# Patient Record
Sex: FEMALE | Race: BLACK OR AFRICAN AMERICAN | NOT HISPANIC OR LATINO | Employment: OTHER | URBAN - METROPOLITAN AREA
[De-identification: names, ages, dates, MRNs, and addresses within clinical notes are randomized per-mention and may not be internally consistent; named-entity substitution may affect disease eponyms.]

---

## 2023-02-21 PROBLEM — E89.2 STATUS POST PARATHYROIDECTOMY (CMS/HCC): Status: ACTIVE | Noted: 2023-02-21

## 2023-02-21 PROBLEM — M10.9 GOUT: Status: ACTIVE | Noted: 2023-02-21

## 2023-02-21 PROBLEM — R93.0 ABNORMAL MRI OF THE HEAD: Status: ACTIVE | Noted: 2023-02-21

## 2023-02-21 PROBLEM — M54.9 DORSODYNIA: Status: ACTIVE | Noted: 2023-02-21

## 2023-02-21 PROBLEM — E28.8 HYPERANDROGENISM: Status: ACTIVE | Noted: 2023-02-21

## 2023-02-21 PROBLEM — J98.4 RESTRICTIVE LUNG DISEASE: Status: ACTIVE | Noted: 2023-02-21

## 2023-02-21 PROBLEM — L68.0 HIRSUTISM: Status: ACTIVE | Noted: 2023-02-21

## 2023-02-21 PROBLEM — E83.52 SERUM CALCIUM ELEVATED: Status: ACTIVE | Noted: 2023-02-21

## 2023-02-21 PROBLEM — M85.80 OSTEOPENIA: Status: ACTIVE | Noted: 2023-02-21

## 2023-02-21 PROBLEM — R79.89 ELEVATED TESTOSTERONE LEVEL IN FEMALE: Status: ACTIVE | Noted: 2023-02-21

## 2023-02-21 PROBLEM — E55.9 VITAMIN D DEFICIENCY: Status: ACTIVE | Noted: 2023-02-21

## 2023-02-21 PROBLEM — E21.3 HYPERPARATHYROIDISM (MULTI): Status: ACTIVE | Noted: 2023-02-21

## 2023-02-21 PROBLEM — M81.0 OSTEOPOROSIS: Status: ACTIVE | Noted: 2023-02-21

## 2023-02-21 PROBLEM — I63.9 CVA (CEREBRAL VASCULAR ACCIDENT) (MULTI): Status: ACTIVE | Noted: 2023-02-21

## 2023-02-21 PROBLEM — N28.9 ABNORMAL KIDNEY FUNCTION: Status: ACTIVE | Noted: 2023-02-21

## 2023-02-21 PROBLEM — Z86.39 HISTORY OF HYPERCALCEMIA: Status: ACTIVE | Noted: 2023-02-21

## 2023-02-21 PROBLEM — I67.2 INTRACRANIAL ATHEROSCLEROSIS: Status: ACTIVE | Noted: 2023-02-21

## 2023-02-21 PROBLEM — G62.9 PERIPHERAL NEUROPATHY: Status: ACTIVE | Noted: 2023-02-21

## 2023-02-21 PROBLEM — M54.9 BACK PAIN: Status: ACTIVE | Noted: 2023-02-21

## 2023-02-21 PROBLEM — R41.3 MEMORY LOSS: Status: ACTIVE | Noted: 2023-02-21

## 2023-02-21 PROBLEM — Z90.12 ACQUIRED ABSENCE OF BREAST AND NIPPLE, LEFT: Status: ACTIVE | Noted: 2023-02-21

## 2023-02-21 PROBLEM — C50.919 MALIGNANT NEOPLASM OF BREAST (MULTI): Status: ACTIVE | Noted: 2023-02-21

## 2023-02-21 PROBLEM — I10 ESSENTIAL HYPERTENSION: Status: ACTIVE | Noted: 2023-02-21

## 2023-02-21 PROBLEM — Z86.73 HISTORY OF TRANSIENT CEREBRAL ISCHEMIA: Status: ACTIVE | Noted: 2023-02-21

## 2023-02-21 PROBLEM — N18.9 CHRONIC RENAL INSUFFICIENCY: Status: ACTIVE | Noted: 2023-02-21

## 2023-02-21 PROBLEM — D21.9 FIBROIDS: Status: ACTIVE | Noted: 2023-02-21

## 2023-02-21 PROBLEM — E87.6 HYPOKALEMIA: Status: ACTIVE | Noted: 2023-02-21

## 2023-02-21 PROBLEM — E78.5 HYPERLIPIDEMIA: Status: ACTIVE | Noted: 2023-02-21

## 2023-02-21 PROBLEM — G31.84 MILD COGNITIVE IMPAIRMENT: Status: ACTIVE | Noted: 2023-02-21

## 2023-02-21 PROBLEM — R79.89 ELEVATED HOMOCYSTEINE: Status: ACTIVE | Noted: 2023-02-21

## 2023-02-21 PROBLEM — E11.9 DIABETES MELLITUS (MULTI): Status: ACTIVE | Noted: 2023-02-21

## 2023-02-21 PROBLEM — N18.32 CHRONIC RENAL IMPAIRMENT, STAGE 3B (MULTI): Status: ACTIVE | Noted: 2023-02-21

## 2023-02-21 PROBLEM — D75.1 POLYCYTHEMIA: Status: ACTIVE | Noted: 2023-02-21

## 2023-02-21 RX ORDER — DULAGLUTIDE 1.5 MG/.5ML
1.5 INJECTION, SOLUTION SUBCUTANEOUS
COMMUNITY
Start: 2022-05-19 | End: 2023-04-14

## 2023-02-21 RX ORDER — ATORVASTATIN CALCIUM 20 MG/1
1 TABLET, FILM COATED ORAL DAILY
COMMUNITY
Start: 2022-05-19 | End: 2023-06-22 | Stop reason: SDUPTHER

## 2023-02-21 RX ORDER — AMLODIPINE BESYLATE 10 MG/1
1 TABLET ORAL DAILY
COMMUNITY
Start: 2012-05-11 | End: 2023-05-17 | Stop reason: SDUPTHER

## 2023-02-21 RX ORDER — LISINOPRIL 40 MG/1
1 TABLET ORAL DAILY
COMMUNITY
Start: 2015-03-13 | End: 2023-06-22 | Stop reason: SDUPTHER

## 2023-02-21 RX ORDER — VITAMIN B COMPLEX
1 CAPSULE ORAL DAILY
COMMUNITY
Start: 2022-03-07

## 2023-02-21 RX ORDER — ANASTROZOLE 1 MG/1
1 TABLET ORAL DAILY
COMMUNITY
Start: 2015-09-09 | End: 2023-03-31 | Stop reason: SDUPTHER

## 2023-02-21 RX ORDER — BLOOD SUGAR DIAGNOSTIC
STRIP MISCELLANEOUS
COMMUNITY
Start: 2016-08-30

## 2023-02-21 RX ORDER — METOPROLOL SUCCINATE 100 MG/1
1 TABLET, EXTENDED RELEASE ORAL DAILY
COMMUNITY
Start: 2017-10-26 | End: 2023-04-24

## 2023-02-21 RX ORDER — ALLOPURINOL 300 MG/1
1 TABLET ORAL DAILY
COMMUNITY
Start: 2012-06-11

## 2023-02-21 RX ORDER — FOLIC ACID 1 MG/1
1 TABLET ORAL DAILY
COMMUNITY
Start: 2022-03-07

## 2023-02-21 RX ORDER — CLOPIDOGREL BISULFATE 75 MG/1
1 TABLET ORAL DAILY
COMMUNITY
Start: 2022-03-07

## 2023-02-21 RX ORDER — LANCETS 28 GAUGE
EACH MISCELLANEOUS 2 TIMES DAILY
COMMUNITY

## 2023-03-28 ENCOUNTER — OFFICE VISIT (OUTPATIENT)
Dept: PRIMARY CARE | Facility: CLINIC | Age: 73
End: 2023-03-28
Payer: MEDICARE

## 2023-03-28 VITALS
DIASTOLIC BLOOD PRESSURE: 90 MMHG | SYSTOLIC BLOOD PRESSURE: 130 MMHG | WEIGHT: 122 LBS | TEMPERATURE: 95.5 F | BODY MASS INDEX: 20.3 KG/M2

## 2023-03-28 DIAGNOSIS — I10 ESSENTIAL HYPERTENSION: Primary | ICD-10-CM

## 2023-03-28 DIAGNOSIS — N28.1 RENAL CYST: ICD-10-CM

## 2023-03-28 DIAGNOSIS — E11.29 TYPE 2 DIABETES MELLITUS WITH MICROALBUMINURIA, WITHOUT LONG-TERM CURRENT USE OF INSULIN (MULTI): ICD-10-CM

## 2023-03-28 DIAGNOSIS — M81.0 OSTEOPOROSIS WITHOUT CURRENT PATHOLOGICAL FRACTURE, UNSPECIFIED OSTEOPOROSIS TYPE: ICD-10-CM

## 2023-03-28 DIAGNOSIS — R80.9 TYPE 2 DIABETES MELLITUS WITH MICROALBUMINURIA, WITHOUT LONG-TERM CURRENT USE OF INSULIN (MULTI): ICD-10-CM

## 2023-03-28 DIAGNOSIS — N18.32 CHRONIC RENAL IMPAIRMENT, STAGE 3B (MULTI): ICD-10-CM

## 2023-03-28 PROCEDURE — 99213 OFFICE O/P EST LOW 20 MIN: CPT | Performed by: INTERNAL MEDICINE

## 2023-03-28 PROCEDURE — 4010F ACE/ARB THERAPY RXD/TAKEN: CPT | Performed by: INTERNAL MEDICINE

## 2023-03-28 PROCEDURE — 3075F SYST BP GE 130 - 139MM HG: CPT | Performed by: INTERNAL MEDICINE

## 2023-03-28 PROCEDURE — 3044F HG A1C LEVEL LT 7.0%: CPT | Performed by: INTERNAL MEDICINE

## 2023-03-28 PROCEDURE — 3080F DIAST BP >= 90 MM HG: CPT | Performed by: INTERNAL MEDICINE

## 2023-03-28 ASSESSMENT — PATIENT HEALTH QUESTIONNAIRE - PHQ9
2. FEELING DOWN, DEPRESSED OR HOPELESS: NOT AT ALL
1. LITTLE INTEREST OR PLEASURE IN DOING THINGS: NOT AT ALL
SUM OF ALL RESPONSES TO PHQ9 QUESTIONS 1 AND 2: 0

## 2023-03-28 NOTE — PROGRESS NOTES
Subjective   Patient ID: Saige Yates is a 72 y.o. female who presents for No chief complaint on file..  HPI  No acute complaints. Pt is feeling really well. She is eating healthy and is happy with her weight. Feels her energy levels improving.   Review of Systems      /90   Temp 35.3 °C (95.5 °F)   Wt 55.3 kg (122 lb)   BMI 20.30 kg/m²     Objective   Physical Exam  Constitutional:       General: She is not in acute distress.     Appearance: Normal appearance.   HENT:      Head: Normocephalic and atraumatic.      Mouth/Throat:      Mouth: Mucous membranes are moist.      Pharynx: Oropharynx is clear.   Eyes:      Extraocular Movements: Extraocular movements intact.   Cardiovascular:      Rate and Rhythm: Normal rate and regular rhythm.      Heart sounds: No murmur heard.  Pulmonary:      Effort: Pulmonary effort is normal.      Breath sounds: Normal breath sounds. No wheezing.   Abdominal:      General: Bowel sounds are normal. There is no distension.      Palpations: Abdomen is soft.      Tenderness: There is no abdominal tenderness.   Skin:     General: Skin is warm and dry.   Neurological:      General: No focal deficit present.      Mental Status: She is alert and oriented to person, place, and time.   Psychiatric:         Mood and Affect: Mood normal.         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 Has remained stable  US renal obtained showed L renal cyst that has grown in size since 2017 - referral to urology sent   Continue Lisinopril 40mg daily             Musculoskeletal    Osteoporosis     Pt no longer on Risendronate due to GI discomfort  Follows with endocrinology. Will follow up with them to manage this due to pts  complicated history of parathyroidectomy             Endocrine/Metabolic    Diabetes mellitus (CMS/HCC)     Improving A1C, continue Trulicity          Other Visit Diagnoses       Renal cyst        Relevant Orders    Referral to Urology          Health Maintenance:  Mammogram Due 5/2023  Colonoscopy DUE 12/2023  DEXA due 2024    Follow up in 4 months with repeat lab work at that time.

## 2023-03-28 NOTE — PROGRESS NOTES
I reviewed with the resident the medical history and the resident’s findings on physical examination.  I discussed with the resident the patient’s diagnosis and concur with the treatment plan as documented in the resident note.     Scarlett Bangura MD

## 2023-03-28 NOTE — ASSESSMENT & PLAN NOTE
Pt no longer on Risendronate due to GI discomfort  Follows with endocrinology. Will follow up with them to manage this due to pts complicated history of parathyroidectomy

## 2023-03-28 NOTE — ASSESSMENT & PLAN NOTE
Likely secondary to diabetic renal disease. Has remained stable  US renal obtained showed L renal cyst that has grown in size since 2017 - referral to urology sent   Continue Lisinopril 40mg daily

## 2023-03-31 DIAGNOSIS — C50.919 MALIGNANT NEOPLASM OF FEMALE BREAST, UNSPECIFIED ESTROGEN RECEPTOR STATUS, UNSPECIFIED LATERALITY, UNSPECIFIED SITE OF BREAST (MULTI): Primary | ICD-10-CM

## 2023-03-31 RX ORDER — ANASTROZOLE 1 MG/1
1 TABLET ORAL DAILY
Qty: 90 TABLET | Refills: 0 | Status: SHIPPED | OUTPATIENT
Start: 2023-03-31 | End: 2024-05-28 | Stop reason: SDUPTHER

## 2023-04-14 DIAGNOSIS — E11.29 TYPE 2 DIABETES MELLITUS WITH MICROALBUMINURIA, WITHOUT LONG-TERM CURRENT USE OF INSULIN (MULTI): Primary | ICD-10-CM

## 2023-04-14 DIAGNOSIS — R80.9 TYPE 2 DIABETES MELLITUS WITH MICROALBUMINURIA, WITHOUT LONG-TERM CURRENT USE OF INSULIN (MULTI): Primary | ICD-10-CM

## 2023-04-14 RX ORDER — DULAGLUTIDE 1.5 MG/.5ML
1.5 INJECTION, SOLUTION SUBCUTANEOUS
Qty: 6 ML | Refills: 0 | Status: SHIPPED | OUTPATIENT
Start: 2023-04-14 | End: 2023-06-14

## 2023-04-23 DIAGNOSIS — I67.2 INTRACRANIAL ATHEROSCLEROSIS: Primary | ICD-10-CM

## 2023-04-24 RX ORDER — METOPROLOL SUCCINATE 100 MG/1
TABLET, EXTENDED RELEASE ORAL
Qty: 90 TABLET | Refills: 3 | Status: SHIPPED | OUTPATIENT
Start: 2023-04-24 | End: 2023-12-28 | Stop reason: SDUPTHER

## 2023-05-17 DIAGNOSIS — I10 ESSENTIAL HYPERTENSION: Primary | ICD-10-CM

## 2023-05-17 RX ORDER — AMLODIPINE BESYLATE 10 MG/1
10 TABLET ORAL DAILY
Qty: 90 TABLET | Refills: 1 | Status: SHIPPED | OUTPATIENT
Start: 2023-05-17 | End: 2023-09-26 | Stop reason: SDUPTHER

## 2023-06-14 DIAGNOSIS — R80.9 TYPE 2 DIABETES MELLITUS WITH MICROALBUMINURIA, WITHOUT LONG-TERM CURRENT USE OF INSULIN (MULTI): ICD-10-CM

## 2023-06-14 DIAGNOSIS — E11.29 TYPE 2 DIABETES MELLITUS WITH MICROALBUMINURIA, WITHOUT LONG-TERM CURRENT USE OF INSULIN (MULTI): ICD-10-CM

## 2023-06-14 RX ORDER — DULAGLUTIDE 1.5 MG/.5ML
INJECTION, SOLUTION SUBCUTANEOUS
Qty: 6 ML | Refills: 3 | Status: SHIPPED | OUTPATIENT
Start: 2023-06-14 | End: 2023-06-16

## 2023-06-15 DIAGNOSIS — R80.9 TYPE 2 DIABETES MELLITUS WITH MICROALBUMINURIA, WITHOUT LONG-TERM CURRENT USE OF INSULIN (MULTI): ICD-10-CM

## 2023-06-15 DIAGNOSIS — E11.29 TYPE 2 DIABETES MELLITUS WITH MICROALBUMINURIA, WITHOUT LONG-TERM CURRENT USE OF INSULIN (MULTI): ICD-10-CM

## 2023-06-16 RX ORDER — DULAGLUTIDE 1.5 MG/.5ML
INJECTION, SOLUTION SUBCUTANEOUS
Qty: 6 ML | Refills: 3 | Status: SHIPPED | OUTPATIENT
Start: 2023-06-16 | End: 2024-02-02 | Stop reason: SDUPTHER

## 2023-06-20 LAB
ALBUMIN (G/DL) IN SER/PLAS: 3.9 G/DL (ref 3.4–5)
ANION GAP IN SER/PLAS: 17 MMOL/L (ref 10–20)
CALCIDIOL (25 OH VITAMIN D3) (NG/ML) IN SER/PLAS: 43 NG/ML
CALCIUM (MG/DL) IN SER/PLAS: 11 MG/DL (ref 8.6–10.6)
CARBON DIOXIDE, TOTAL (MMOL/L) IN SER/PLAS: 25 MMOL/L (ref 21–32)
CHLORIDE (MMOL/L) IN SER/PLAS: 102 MMOL/L (ref 98–107)
CREATININE (MG/DL) IN SER/PLAS: 1.59 MG/DL (ref 0.5–1.05)
ESTIMATED AVERAGE GLUCOSE FOR HBA1C: 163 MG/DL
GFR FEMALE: 34 ML/MIN/1.73M2
GLUCOSE (MG/DL) IN SER/PLAS: 177 MG/DL (ref 74–99)
HEMOGLOBIN A1C/HEMOGLOBIN TOTAL IN BLOOD: 7.3 %
PARATHYRIN INTACT (PG/ML) IN SER/PLAS: 75.3 PG/ML (ref 18.5–88)
PHOSPHATE (MG/DL) IN SER/PLAS: 3.7 MG/DL (ref 2.5–4.9)
POTASSIUM (MMOL/L) IN SER/PLAS: 4 MMOL/L (ref 3.5–5.3)
SODIUM (MMOL/L) IN SER/PLAS: 140 MMOL/L (ref 136–145)
UREA NITROGEN (MG/DL) IN SER/PLAS: 32 MG/DL (ref 6–23)

## 2023-06-22 DIAGNOSIS — E78.5 HYPERLIPIDEMIA, UNSPECIFIED HYPERLIPIDEMIA TYPE: ICD-10-CM

## 2023-06-22 DIAGNOSIS — I10 ESSENTIAL HYPERTENSION: ICD-10-CM

## 2023-06-22 RX ORDER — ATORVASTATIN CALCIUM 20 MG/1
20 TABLET, FILM COATED ORAL DAILY
Qty: 90 TABLET | Refills: 0 | Status: SHIPPED | OUTPATIENT
Start: 2023-06-22 | End: 2023-09-03

## 2023-06-22 RX ORDER — LISINOPRIL 40 MG/1
40 TABLET ORAL DAILY
Qty: 90 TABLET | Refills: 0 | Status: SHIPPED | OUTPATIENT
Start: 2023-06-22 | End: 2023-09-11

## 2023-06-26 LAB
TESTOSTERONE FREE (CHAN): 41.6 PG/ML (ref 0.2–3.7)
TESTOSTERONE,TOTAL,LC-MS/MS: 243 NG/DL (ref 2–45)

## 2023-08-29 ENCOUNTER — OFFICE VISIT (OUTPATIENT)
Dept: PRIMARY CARE | Facility: CLINIC | Age: 73
End: 2023-08-29
Payer: MEDICARE

## 2023-08-29 VITALS
BODY MASS INDEX: 19.3 KG/M2 | DIASTOLIC BLOOD PRESSURE: 60 MMHG | SYSTOLIC BLOOD PRESSURE: 100 MMHG | WEIGHT: 116 LBS | TEMPERATURE: 97.5 F

## 2023-08-29 DIAGNOSIS — I10 ESSENTIAL HYPERTENSION: ICD-10-CM

## 2023-08-29 DIAGNOSIS — R80.9 TYPE 2 DIABETES MELLITUS WITH MICROALBUMINURIA, WITHOUT LONG-TERM CURRENT USE OF INSULIN (MULTI): ICD-10-CM

## 2023-08-29 DIAGNOSIS — N18.30 CHRONIC RENAL IMPAIRMENT, STAGE 3 (MODERATE), UNSPECIFIED WHETHER STAGE 3A OR 3B CKD (MULTI): ICD-10-CM

## 2023-08-29 DIAGNOSIS — C50.919 MALIGNANT NEOPLASM OF FEMALE BREAST, UNSPECIFIED ESTROGEN RECEPTOR STATUS, UNSPECIFIED LATERALITY, UNSPECIFIED SITE OF BREAST (MULTI): ICD-10-CM

## 2023-08-29 DIAGNOSIS — E11.29 TYPE 2 DIABETES MELLITUS WITH MICROALBUMINURIA, WITHOUT LONG-TERM CURRENT USE OF INSULIN (MULTI): ICD-10-CM

## 2023-08-29 DIAGNOSIS — E55.9 VITAMIN D DEFICIENCY: ICD-10-CM

## 2023-08-29 DIAGNOSIS — E78.5 HYPERLIPIDEMIA, UNSPECIFIED HYPERLIPIDEMIA TYPE: Primary | ICD-10-CM

## 2023-08-29 PROCEDURE — 99214 OFFICE O/P EST MOD 30 MIN: CPT | Performed by: INTERNAL MEDICINE

## 2023-08-29 PROCEDURE — 3051F HG A1C>EQUAL 7.0%<8.0%: CPT | Performed by: INTERNAL MEDICINE

## 2023-08-29 PROCEDURE — 3078F DIAST BP <80 MM HG: CPT | Performed by: INTERNAL MEDICINE

## 2023-08-29 PROCEDURE — 1036F TOBACCO NON-USER: CPT | Performed by: INTERNAL MEDICINE

## 2023-08-29 PROCEDURE — 1126F AMNT PAIN NOTED NONE PRSNT: CPT | Performed by: INTERNAL MEDICINE

## 2023-08-29 PROCEDURE — 3074F SYST BP LT 130 MM HG: CPT | Performed by: INTERNAL MEDICINE

## 2023-08-29 PROCEDURE — 1160F RVW MEDS BY RX/DR IN RCRD: CPT | Performed by: INTERNAL MEDICINE

## 2023-08-29 PROCEDURE — 1159F MED LIST DOCD IN RCRD: CPT | Performed by: INTERNAL MEDICINE

## 2023-08-29 PROCEDURE — G0008 ADMIN INFLUENZA VIRUS VAC: HCPCS | Performed by: INTERNAL MEDICINE

## 2023-08-29 PROCEDURE — 90662 IIV NO PRSV INCREASED AG IM: CPT | Performed by: INTERNAL MEDICINE

## 2023-08-29 PROCEDURE — 4010F ACE/ARB THERAPY RXD/TAKEN: CPT | Performed by: INTERNAL MEDICINE

## 2023-08-29 ASSESSMENT — PATIENT HEALTH QUESTIONNAIRE - PHQ9
2. FEELING DOWN, DEPRESSED OR HOPELESS: NOT AT ALL
SUM OF ALL RESPONSES TO PHQ9 QUESTIONS 1 AND 2: 0
1. LITTLE INTEREST OR PLEASURE IN DOING THINGS: NOT AT ALL

## 2023-08-29 ASSESSMENT — ENCOUNTER SYMPTOMS
RESPIRATORY NEGATIVE: 1
GASTROINTESTINAL NEGATIVE: 1
CONSTITUTIONAL NEGATIVE: 1
CARDIOVASCULAR NEGATIVE: 1

## 2023-08-29 NOTE — PROGRESS NOTES
Subjective   Patient ID: Saige Yates is a 72 y.o. female who presents for Follow-up.  HPI    Review of Systems   Constitutional: Negative.    Respiratory: Negative.     Cardiovascular: Negative.    Gastrointestinal: Negative.        Objective   Physical Exam  Constitutional:       Appearance: She is well-developed.   Cardiovascular:      Rate and Rhythm: Normal rate and regular rhythm.      Heart sounds: Normal heart sounds. No murmur heard.  Pulmonary:      Effort: Pulmonary effort is normal.      Breath sounds: Normal breath sounds.   Abdominal:      General: Bowel sounds are normal.      Palpations: Abdomen is soft.         Assessment/Plan   Problem List Items Addressed This Visit       Vitamin D deficiency    Diabetes mellitus (CMS/HCC)    Malignant neoplasm of breast (CMS/HCC)    Hyperlipidemia - Primary    Essential hypertension    Chronic renal insufficiency     The patient is here for a follow up on chronic medical problems.  His medications were reviewed, pharmacy updated, notes reviewed, prior labs reviewed.       HTN.  Well controlled.  Continue with current medications.  Check BP at home daily.  Report to us if the numbers are higher than 130/80.        HLD  Stable  Continue with statins  Check lipids       Diabetes Mellitus type II, under good  control.  1. Rx changes none  2. Education: Reviewed ‘ABCs’ of diabetes management (respective goals in parentheses):  A1C (<7), blood pressure (<130/80), and cholesterol (LDL <100).  3. Compliance at present is estimated to be good. Efforts to improve compliance were discussed.  4. Follow up in 3 months           Scarlett Bangura MD

## 2023-09-01 DIAGNOSIS — E78.5 HYPERLIPIDEMIA, UNSPECIFIED HYPERLIPIDEMIA TYPE: ICD-10-CM

## 2023-09-03 RX ORDER — ATORVASTATIN CALCIUM 20 MG/1
20 TABLET, FILM COATED ORAL DAILY
Qty: 90 TABLET | Refills: 3 | Status: SHIPPED | OUTPATIENT
Start: 2023-09-03

## 2023-09-10 DIAGNOSIS — I10 ESSENTIAL HYPERTENSION: ICD-10-CM

## 2023-09-11 RX ORDER — LISINOPRIL 40 MG/1
40 TABLET ORAL DAILY
Qty: 90 TABLET | Refills: 3 | Status: SHIPPED | OUTPATIENT
Start: 2023-09-11

## 2023-09-26 ENCOUNTER — LAB (OUTPATIENT)
Dept: LAB | Facility: LAB | Age: 73
End: 2023-09-26
Payer: MEDICARE

## 2023-09-26 ENCOUNTER — OFFICE VISIT (OUTPATIENT)
Dept: PRIMARY CARE | Facility: CLINIC | Age: 73
End: 2023-09-26
Payer: MEDICARE

## 2023-09-26 VITALS — SYSTOLIC BLOOD PRESSURE: 140 MMHG | DIASTOLIC BLOOD PRESSURE: 80 MMHG | WEIGHT: 114 LBS | BODY MASS INDEX: 19.58 KG/M2

## 2023-09-26 DIAGNOSIS — R41.3 MEMORY LOSS: ICD-10-CM

## 2023-09-26 DIAGNOSIS — E78.5 HYPERLIPIDEMIA, UNSPECIFIED HYPERLIPIDEMIA TYPE: Primary | ICD-10-CM

## 2023-09-26 DIAGNOSIS — E55.9 VITAMIN D DEFICIENCY: ICD-10-CM

## 2023-09-26 DIAGNOSIS — M81.0 OSTEOPOROSIS WITHOUT CURRENT PATHOLOGICAL FRACTURE, UNSPECIFIED OSTEOPOROSIS TYPE: ICD-10-CM

## 2023-09-26 DIAGNOSIS — N18.32 CHRONIC RENAL IMPAIRMENT, STAGE 3B (MULTI): ICD-10-CM

## 2023-09-26 DIAGNOSIS — E78.5 HYPERLIPIDEMIA, UNSPECIFIED HYPERLIPIDEMIA TYPE: ICD-10-CM

## 2023-09-26 DIAGNOSIS — E89.2 STATUS POST PARATHYROIDECTOMY (CMS/HCC): ICD-10-CM

## 2023-09-26 DIAGNOSIS — R80.9 TYPE 2 DIABETES MELLITUS WITH MICROALBUMINURIA, WITHOUT LONG-TERM CURRENT USE OF INSULIN (MULTI): ICD-10-CM

## 2023-09-26 DIAGNOSIS — I10 ESSENTIAL HYPERTENSION: ICD-10-CM

## 2023-09-26 DIAGNOSIS — E11.29 TYPE 2 DIABETES MELLITUS WITH MICROALBUMINURIA, WITHOUT LONG-TERM CURRENT USE OF INSULIN (MULTI): ICD-10-CM

## 2023-09-26 DIAGNOSIS — E46 PROTEIN-CALORIE MALNUTRITION, UNSPECIFIED SEVERITY (MULTI): ICD-10-CM

## 2023-09-26 LAB
COBALAMIN (VITAMIN B12) (PG/ML) IN SER/PLAS: 796 PG/ML (ref 211–911)
ESTIMATED AVERAGE GLUCOSE FOR HBA1C: 143 MG/DL
FOLATE (NG/ML) IN SER/PLAS: 17.5 NG/ML
HEMOGLOBIN A1C/HEMOGLOBIN TOTAL IN BLOOD: 6.6 %
HEPATITIS C VIRUS AB PRESENCE IN SERUM: NONREACTIVE
THYROTROPIN (MIU/L) IN SER/PLAS BY DETECTION LIMIT <= 0.05 MIU/L: 0.95 MIU/L (ref 0.44–3.98)

## 2023-09-26 PROCEDURE — 3044F HG A1C LEVEL LT 7.0%: CPT | Performed by: INTERNAL MEDICINE

## 2023-09-26 PROCEDURE — 84443 ASSAY THYROID STIM HORMONE: CPT

## 2023-09-26 PROCEDURE — 4010F ACE/ARB THERAPY RXD/TAKEN: CPT | Performed by: INTERNAL MEDICINE

## 2023-09-26 PROCEDURE — 1160F RVW MEDS BY RX/DR IN RCRD: CPT | Performed by: INTERNAL MEDICINE

## 2023-09-26 PROCEDURE — 1159F MED LIST DOCD IN RCRD: CPT | Performed by: INTERNAL MEDICINE

## 2023-09-26 PROCEDURE — 82607 VITAMIN B-12: CPT

## 2023-09-26 PROCEDURE — 3079F DIAST BP 80-89 MM HG: CPT | Performed by: INTERNAL MEDICINE

## 2023-09-26 PROCEDURE — 3077F SYST BP >= 140 MM HG: CPT | Performed by: INTERNAL MEDICINE

## 2023-09-26 PROCEDURE — 86592 SYPHILIS TEST NON-TREP QUAL: CPT

## 2023-09-26 PROCEDURE — 84550 ASSAY OF BLOOD/URIC ACID: CPT

## 2023-09-26 PROCEDURE — 82746 ASSAY OF FOLIC ACID SERUM: CPT

## 2023-09-26 PROCEDURE — 80061 LIPID PANEL: CPT

## 2023-09-26 PROCEDURE — 99214 OFFICE O/P EST MOD 30 MIN: CPT | Performed by: INTERNAL MEDICINE

## 2023-09-26 PROCEDURE — 36415 COLL VENOUS BLD VENIPUNCTURE: CPT

## 2023-09-26 PROCEDURE — 69210 REMOVE IMPACTED EAR WAX UNI: CPT | Performed by: INTERNAL MEDICINE

## 2023-09-26 PROCEDURE — 3051F HG A1C>EQUAL 7.0%<8.0%: CPT | Performed by: INTERNAL MEDICINE

## 2023-09-26 PROCEDURE — 1126F AMNT PAIN NOTED NONE PRSNT: CPT | Performed by: INTERNAL MEDICINE

## 2023-09-26 PROCEDURE — 80053 COMPREHEN METABOLIC PANEL: CPT

## 2023-09-26 PROCEDURE — 86803 HEPATITIS C AB TEST: CPT

## 2023-09-26 PROCEDURE — 83036 HEMOGLOBIN GLYCOSYLATED A1C: CPT

## 2023-09-26 PROCEDURE — 1036F TOBACCO NON-USER: CPT | Performed by: INTERNAL MEDICINE

## 2023-09-26 RX ORDER — AMLODIPINE BESYLATE 10 MG/1
10 TABLET ORAL DAILY
Qty: 90 TABLET | Refills: 1 | Status: SHIPPED | OUTPATIENT
Start: 2023-09-26

## 2023-09-26 ASSESSMENT — ENCOUNTER SYMPTOMS
GASTROINTESTINAL NEGATIVE: 1
RESPIRATORY NEGATIVE: 1
CARDIOVASCULAR NEGATIVE: 1
CONSTITUTIONAL NEGATIVE: 1

## 2023-09-26 ASSESSMENT — PATIENT HEALTH QUESTIONNAIRE - PHQ9
SUM OF ALL RESPONSES TO PHQ9 QUESTIONS 1 AND 2: 2
2. FEELING DOWN, DEPRESSED OR HOPELESS: SEVERAL DAYS
1. LITTLE INTEREST OR PLEASURE IN DOING THINGS: SEVERAL DAYS

## 2023-09-26 NOTE — PROGRESS NOTES
Subjective   Patient ID: Saige Yates is a 72 y.o. female who presents for Follow-up.  HPI    Review of Systems   Constitutional: Negative.    Respiratory: Negative.     Cardiovascular: Negative.    Gastrointestinal: Negative.    L ear sounds  Neck stiffness  Memory problems      Objective   Physical Exam  Constitutional:       Appearance: She is well-developed.   Cardiovascular:      Rate and Rhythm: Normal rate and regular rhythm.      Heart sounds: Normal heart sounds. No murmur heard.  Pulmonary:      Effort: Pulmonary effort is normal.      Breath sounds: Normal breath sounds.   Abdominal:      General: Bowel sounds are normal.      Palpations: Abdomen is soft.     L ear cerumen impaction    Assessment/Plan   Problem List Items Addressed This Visit       Vitamin D deficiency    Relevant Orders    Comprehensive Metabolic Panel    TSH with reflex to Free T4 if abnormal    Hemoglobin A1C    Lipid Panel    Albumin, urine, random    Hepatitis C antibody    Uric acid    Diabetes mellitus (CMS/HCC)    Relevant Orders    Comprehensive Metabolic Panel    TSH with reflex to Free T4 if abnormal    Hemoglobin A1C    Lipid Panel    Albumin, urine, random    Hepatitis C antibody    Uric acid    Osteoporosis    Relevant Orders    Comprehensive Metabolic Panel    TSH with reflex to Free T4 if abnormal    Hemoglobin A1C    Lipid Panel    Albumin, urine, random    Hepatitis C antibody    Uric acid    Hyperlipidemia - Primary    Relevant Orders    Comprehensive Metabolic Panel    TSH with reflex to Free T4 if abnormal    Hemoglobin A1C    Lipid Panel    Albumin, urine, random    Hepatitis C antibody    Uric acid    Essential hypertension    Relevant Medications    amLODIPine (Norvasc) 10 mg tablet    Other Relevant Orders    Comprehensive Metabolic Panel    TSH with reflex to Free T4 if abnormal    Hemoglobin A1C    Lipid Panel    Albumin, urine, random    Hepatitis C antibody    Uric acid    Chronic renal impairment, stage 3b  (CMS/[facility])    Relevant Orders    Comprehensive Metabolic Panel    TSH with reflex to Free T4 if abnormal    Hemoglobin A1C    Lipid Panel    Albumin, urine, random    Hepatitis C antibody    Uric acid     Lavage L ear  Check BW, including A1C  Do MMSE today  Recommend massage therapy for the neck    The patient is here for a follow up on chronic medical problems.  His medications were reviewed, pharmacy updated, notes reviewed, prior labs reviewed.    Lab Results   Component Value Date    WBC 7.8 08/21/2023    HGB 14.9 08/21/2023    HCT 46.8 (H) 08/21/2023     08/21/2023    CHOL 144 09/06/2022    TRIG 80 09/06/2022    HDL 66.9 09/06/2022    ALT 13 02/14/2023    AST 15 02/14/2023     06/20/2023    K 4.0 06/20/2023     06/20/2023    CREATININE 1.59 (H) 06/20/2023    BUN 32 (H) 06/20/2023    CO2 25 06/20/2023    TSH 1.37 09/06/2022    INR 1.0 04/13/2018    HGBA1C 7.3 (A) 06/20/2023     Par  MDM  1) COMPLEXITY: MORE THAN 1 STABLE CHRONIC CONDITION ADDRESSED OR 1 ACUTE ILLNESS ADDRESSED   2)DATA: TESTS INTERPRETED AND OR ORDERED, TOOK INDEPENDENT HISTORY OR RECORDS REVIEWED  3)RISK: MODERATE RISK DUE TO NATURE OF MEDICAL CONDITIONS/COMORBIDITY OR MEDICATIONS ORDERED OR SURGICAL OR PROCEDURE REFERRAL     MMSE around 22  Recommend comprehensive memory assessment  Referral placed    Scarlett Bangura MD

## 2023-09-27 ENCOUNTER — LAB (OUTPATIENT)
Dept: LAB | Facility: LAB | Age: 73
End: 2023-09-27
Payer: MEDICARE

## 2023-09-27 DIAGNOSIS — E11.29 TYPE 2 DIABETES MELLITUS WITH MICROALBUMINURIA, WITHOUT LONG-TERM CURRENT USE OF INSULIN (MULTI): ICD-10-CM

## 2023-09-27 DIAGNOSIS — I10 ESSENTIAL HYPERTENSION: ICD-10-CM

## 2023-09-27 DIAGNOSIS — M81.0 OSTEOPOROSIS WITHOUT CURRENT PATHOLOGICAL FRACTURE, UNSPECIFIED OSTEOPOROSIS TYPE: ICD-10-CM

## 2023-09-27 DIAGNOSIS — E78.5 HYPERLIPIDEMIA, UNSPECIFIED HYPERLIPIDEMIA TYPE: ICD-10-CM

## 2023-09-27 DIAGNOSIS — N18.32 CHRONIC RENAL IMPAIRMENT, STAGE 3B (MULTI): ICD-10-CM

## 2023-09-27 DIAGNOSIS — R80.9 TYPE 2 DIABETES MELLITUS WITH MICROALBUMINURIA, WITHOUT LONG-TERM CURRENT USE OF INSULIN (MULTI): ICD-10-CM

## 2023-09-27 DIAGNOSIS — E55.9 VITAMIN D DEFICIENCY: ICD-10-CM

## 2023-09-27 PROBLEM — E46 PROTEIN-CALORIE MALNUTRITION, UNSPECIFIED SEVERITY (MULTI): Status: ACTIVE | Noted: 2023-09-27

## 2023-09-27 LAB
ALANINE AMINOTRANSFERASE (SGPT) (U/L) IN SER/PLAS: 9 U/L (ref 7–45)
ALBUMIN (G/DL) IN SER/PLAS: 4.4 G/DL (ref 3.4–5)
ALBUMIN (MG/L) IN URINE: 394.6 MG/L
ALBUMIN/CREATININE (UG/MG) IN URINE: 1160.6 UG/MG CRT (ref 0–30)
ALKALINE PHOSPHATASE (U/L) IN SER/PLAS: 101 U/L (ref 33–136)
ANION GAP IN SER/PLAS: 15 MMOL/L (ref 10–20)
ASPARTATE AMINOTRANSFERASE (SGOT) (U/L) IN SER/PLAS: 14 U/L (ref 9–39)
BILIRUBIN TOTAL (MG/DL) IN SER/PLAS: 0.9 MG/DL (ref 0–1.2)
CALCIUM (MG/DL) IN SER/PLAS: 11.3 MG/DL (ref 8.6–10.6)
CARBON DIOXIDE, TOTAL (MMOL/L) IN SER/PLAS: 28 MMOL/L (ref 21–32)
CHLORIDE (MMOL/L) IN SER/PLAS: 104 MMOL/L (ref 98–107)
CHOLESTEROL (MG/DL) IN SER/PLAS: 140 MG/DL (ref 0–199)
CHOLESTEROL IN HDL (MG/DL) IN SER/PLAS: 72.1 MG/DL
CHOLESTEROL/HDL RATIO: 1.9
CREATININE (MG/DL) IN SER/PLAS: 1.42 MG/DL (ref 0.5–1.05)
CREATININE (MG/DL) IN URINE: 34 MG/DL (ref 20–320)
GFR FEMALE: 39 ML/MIN/1.73M2
GLUCOSE (MG/DL) IN SER/PLAS: 125 MG/DL (ref 74–99)
LDL: 57 MG/DL (ref 0–99)
POTASSIUM (MMOL/L) IN SER/PLAS: 3.9 MMOL/L (ref 3.5–5.3)
PROTEIN TOTAL: 7.7 G/DL (ref 6.4–8.2)
RPR MONITORING: NONREACTIVE
SODIUM (MMOL/L) IN SER/PLAS: 143 MMOL/L (ref 136–145)
TRIGLYCERIDE (MG/DL) IN SER/PLAS: 57 MG/DL (ref 0–149)
URATE (MG/DL) IN SER/PLAS: 8 MG/DL (ref 2.3–6.7)
UREA NITROGEN (MG/DL) IN SER/PLAS: 22 MG/DL (ref 6–23)
VLDL: 11 MG/DL (ref 0–40)

## 2023-09-27 PROCEDURE — 82570 ASSAY OF URINE CREATININE: CPT

## 2023-09-27 PROCEDURE — 82043 UR ALBUMIN QUANTITATIVE: CPT

## 2023-09-27 NOTE — RESULT ENCOUNTER NOTE
Results reviewed.  There are some minor abnormalities, which are not concerning.  No changes in medications are needed at this time.  Please continue with current treatment.    Best,  Dr. Pantoja

## 2023-10-17 ENCOUNTER — APPOINTMENT (OUTPATIENT)
Dept: PRIMARY CARE | Facility: CLINIC | Age: 73
End: 2023-10-17
Payer: MEDICARE

## 2023-11-27 ENCOUNTER — APPOINTMENT (OUTPATIENT)
Dept: PRIMARY CARE | Facility: CLINIC | Age: 73
End: 2023-11-27
Payer: MEDICARE

## 2023-12-12 ENCOUNTER — APPOINTMENT (OUTPATIENT)
Dept: ENDOCRINOLOGY | Facility: CLINIC | Age: 73
End: 2023-12-12
Payer: MEDICARE

## 2023-12-15 ENCOUNTER — HOSPITAL ENCOUNTER (OUTPATIENT)
Dept: RADIATION ONCOLOGY | Facility: CLINIC | Age: 73
Setting detail: RADIATION/ONCOLOGY SERIES
Discharge: HOME | End: 2023-12-15
Payer: MEDICARE

## 2023-12-15 VITALS
BODY MASS INDEX: 20.41 KG/M2 | TEMPERATURE: 97.7 F | DIASTOLIC BLOOD PRESSURE: 88 MMHG | WEIGHT: 118.83 LBS | OXYGEN SATURATION: 100 % | SYSTOLIC BLOOD PRESSURE: 133 MMHG | HEART RATE: 83 BPM | RESPIRATION RATE: 18 BRPM

## 2023-12-15 DIAGNOSIS — C50.919 MALIGNANT NEOPLASM OF FEMALE BREAST, UNSPECIFIED ESTROGEN RECEPTOR STATUS, UNSPECIFIED LATERALITY, UNSPECIFIED SITE OF BREAST (MULTI): Primary | ICD-10-CM

## 2023-12-15 PROCEDURE — 99213 OFFICE O/P EST LOW 20 MIN: CPT | Performed by: NURSE PRACTITIONER

## 2023-12-15 NOTE — PROGRESS NOTES
Radiation Oncology Follow-Up    Patient Name:  Saige Yates  MRN:  [id number]  :  1950    Referring Provider: No ref. 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 Following surgery, she received radiation therapy to the left breast consisting of a dose of 42.56 Gy with tumor bed boost to 52.56 Gy, completed on 2015. She was initiated on anastrozole post treatment.      SUBJECTIVE  History of Present Illness:   Saige Yates is here today for routine follow up 8 yrs post radiation. She is doing well . She is now at home and son has been helping with her rehab.  She is diabetic and he is helping with monitoring her diet and medication.  She denies any breast complaints, cough, SOB, chest pain, headaches, N/V or lymphedema. She is having some itching  of left breast and hot flashes at times. Her Mammogram in November shows no evidence of malignancy.     Review of Systems:    Review of Systems   All other systems reviewed and are negative.    Performance Status:   The Karnofsky performance scale today is 90, Able to carry on normal activity; minor signs or symptoms of disease (ECOG equivalent 0).      OBJECTIVE    Current Outpatient Medications:     allopurinol (Zyloprim) 300 mg tablet, Take 1 tablet (300 mg) by mouth once daily., Disp: , Rfl:     amLODIPine (Norvasc) 10 mg tablet, Take 1 tablet (10 mg) by mouth once daily., Disp: 90 tablet, Rfl: 1    anastrozole (Arimidex) 1 mg tablet, Take 1 tablet  (1 mg total) by mouth once daily., Disp: 90 tablet, Rfl: 0    atorvastatin (Lipitor) 20 mg tablet, TAKE 1 TABLET BY MOUTH ONCE  DAILY, Disp: 90 tablet, Rfl: 3    b complex vitamins capsule, Take 1 tablet by mouth once daily., Disp: , Rfl:     blood sugar diagnostic (Prodigy No Coding) strip, TEST twice a day   dx E11.9, Disp: , Rfl:     clopidogrel (Plavix) 75 mg tablet, Take 1 tablet (75 mg) by mouth once daily., Disp: , Rfl:     folic acid (Folvite) 1 mg tablet, Take 1 tablet (1 mg) by mouth once daily., Disp: , Rfl:     FreeStyle lancets 28 gauge, 2 times a day. Use as instructed, Disp: , Rfl:     lisinopril 40 mg tablet, TAKE 1 TABLET BY MOUTH ONCE  DAILY, Disp: 90 tablet, Rfl: 3    metoprolol succinate XL (Toprol-XL) 100 mg 24 hr tablet, TAKE 1 TABLET BY MOUTH DAILY, Disp: 90 tablet, Rfl: 3    Trulicity 1.5 mg/0.5 mL pen injector, INJECT THE CONTENTS OF ONE PEN  SUBCUTANEOUSLY WEEKLY AS  DIRECTED, Disp: 6 mL, Rfl: 3     Physical Exam  Constitutional:       Appearance: Normal appearance.   HENT:      Head: Normocephalic and atraumatic.      Nose: Nose normal.      Mouth/Throat:      Mouth: Mucous membranes are moist.      Pharynx: Oropharynx is clear.   Eyes:      Conjunctiva/sclera: Conjunctivae normal.      Pupils: Pupils are equal, round, and reactive to light.   Cardiovascular:      Rate and Rhythm: Normal rate and regular rhythm.      Heart sounds: Normal heart sounds.   Pulmonary:      Effort: Pulmonary effort is normal.      Breath sounds: Normal breath sounds.   Chest:   Breasts:     Right: Normal. No inverted nipple, mass or nipple discharge.      Left: Normal. No inverted nipple, mass or nipple discharge.   Abdominal:      Palpations: Abdomen is soft.   Musculoskeletal:         General: No swelling. Normal range of motion.      Cervical back: Normal range of motion and neck supple.   Lymphadenopathy:      Cervical: No cervical adenopathy.      Upper Body:      Right upper body: No supraclavicular or  axillary adenopathy.      Left upper body: No supraclavicular or axillary adenopathy.   Skin:     General: Skin is warm and dry.   Neurological:      General: No focal deficit present.      Mental Status: She is alert and oriented to person, place, and time.   Psychiatric:         Mood and Affect: Mood normal.         Behavior: Behavior normal.         RESULTS:  Narrative & Impression   MRN: [id number]  Patient Name: NIKOLE FRY     STUDY:  DIGITAL MAMM SCREENING W/ SOPHIE;  5/31/2022 11:21 am     ACCESSION NUMBER(S):  [id number]     ORDERING CLINICIAN:  ADIA GUEVARA     INDICATION:  Screening. History of left lumpectomy with radiation treatment 2015     COMPARISON:  05/25/2021, 03/25/2020, 03/12/2019, 03/09/2018, 03/06/2017     FINDINGS:  2D and tomosynthesis images were reviewed at 1 mm slice thickness.     The breast tissue is heterogeneously dense, which may obscure small  masses.  Stable postsurgical scarring and surgical clips in the deep  upper outer left breast, site of lumpectomy. The lumpectomy bed is  posteriorly located and only partially included in the field of view.  Left breast trabecular thickening and skin thickening is improved  over time, consistent with radiation treatment. Asymmetry in the  anterolateral left breast resolves on additional mammographic views.  No suspicious masses or calcifications are identified.     This study was interpreted with CAD.     IMPRESSION:  No mammographic evidence of malignancy. Stable to improved post  treatment changes, left breast.     BI-RADS CATEGORY:     Category: 2 - Benign.  Recommendation: 1 Year Screening.     ASSESSMENT/PLAN:  73 y.o. female witearly stage node positive left breast cancer s/p breast conserving surgery followed by radiation. Cosmetic outcome is excellent.  She is clinically and radiographically without evidence for recurrence. She will continue on anastrozole and follow up with medical oncology. Radiation follow up now on prn basis.  Instructed the patient to call with any questions or concerns.     Liyah Fowler CNP  [phone]

## 2023-12-28 ENCOUNTER — OFFICE VISIT (OUTPATIENT)
Dept: PRIMARY CARE | Facility: CLINIC | Age: 73
End: 2023-12-28
Payer: MEDICARE

## 2023-12-28 ENCOUNTER — LAB (OUTPATIENT)
Dept: LAB | Facility: LAB | Age: 73
End: 2023-12-28
Payer: MEDICARE

## 2023-12-28 VITALS — SYSTOLIC BLOOD PRESSURE: 110 MMHG | WEIGHT: 117 LBS | DIASTOLIC BLOOD PRESSURE: 80 MMHG | BODY MASS INDEX: 20.1 KG/M2

## 2023-12-28 DIAGNOSIS — E11.29 TYPE 2 DIABETES MELLITUS WITH MICROALBUMINURIA, WITHOUT LONG-TERM CURRENT USE OF INSULIN (MULTI): ICD-10-CM

## 2023-12-28 DIAGNOSIS — D75.1 POLYCYTHEMIA: ICD-10-CM

## 2023-12-28 DIAGNOSIS — Z12.11 SCREENING FOR COLON CANCER: Primary | ICD-10-CM

## 2023-12-28 DIAGNOSIS — R35.89 POLYURIA: Primary | ICD-10-CM

## 2023-12-28 DIAGNOSIS — R35.89 POLYURIA: ICD-10-CM

## 2023-12-28 DIAGNOSIS — I67.2 INTRACRANIAL ATHEROSCLEROSIS: ICD-10-CM

## 2023-12-28 DIAGNOSIS — R80.9 TYPE 2 DIABETES MELLITUS WITH MICROALBUMINURIA, WITHOUT LONG-TERM CURRENT USE OF INSULIN (MULTI): ICD-10-CM

## 2023-12-28 LAB
ERYTHROCYTE [DISTWIDTH] IN BLOOD BY AUTOMATED COUNT: 15.6 % (ref 11.5–14.5)
HCT VFR BLD AUTO: 46.8 % (ref 36–46)
HGB BLD-MCNC: 14.4 G/DL (ref 12–16)
MCH RBC QN AUTO: 26.8 PG (ref 26–34)
MCHC RBC AUTO-ENTMCNC: 30.8 G/DL (ref 32–36)
MCV RBC AUTO: 87 FL (ref 80–100)
NRBC BLD-RTO: 0 /100 WBCS (ref 0–0)
PLATELET # BLD AUTO: 355 X10*3/UL (ref 150–450)
POC HEMOGLOBIN A1C: 6.8 % (ref 4.2–6.5)
RBC # BLD AUTO: 5.37 X10*6/UL (ref 4–5.2)
WBC # BLD AUTO: 6.9 X10*3/UL (ref 4.4–11.3)

## 2023-12-28 PROCEDURE — 3079F DIAST BP 80-89 MM HG: CPT | Performed by: INTERNAL MEDICINE

## 2023-12-28 PROCEDURE — 85027 COMPLETE CBC AUTOMATED: CPT

## 2023-12-28 PROCEDURE — 3044F HG A1C LEVEL LT 7.0%: CPT | Performed by: INTERNAL MEDICINE

## 2023-12-28 PROCEDURE — 99214 OFFICE O/P EST MOD 30 MIN: CPT | Performed by: INTERNAL MEDICINE

## 2023-12-28 PROCEDURE — 3074F SYST BP LT 130 MM HG: CPT | Performed by: INTERNAL MEDICINE

## 2023-12-28 PROCEDURE — 1036F TOBACCO NON-USER: CPT | Performed by: INTERNAL MEDICINE

## 2023-12-28 PROCEDURE — 1126F AMNT PAIN NOTED NONE PRSNT: CPT | Performed by: INTERNAL MEDICINE

## 2023-12-28 PROCEDURE — 1160F RVW MEDS BY RX/DR IN RCRD: CPT | Performed by: INTERNAL MEDICINE

## 2023-12-28 PROCEDURE — 36415 COLL VENOUS BLD VENIPUNCTURE: CPT

## 2023-12-28 PROCEDURE — 4010F ACE/ARB THERAPY RXD/TAKEN: CPT | Performed by: INTERNAL MEDICINE

## 2023-12-28 PROCEDURE — 83036 HEMOGLOBIN GLYCOSYLATED A1C: CPT | Performed by: INTERNAL MEDICINE

## 2023-12-28 PROCEDURE — 1159F MED LIST DOCD IN RCRD: CPT | Performed by: INTERNAL MEDICINE

## 2023-12-28 RX ORDER — METOPROLOL SUCCINATE 100 MG/1
100 TABLET, EXTENDED RELEASE ORAL DAILY
Qty: 90 TABLET | Refills: 3 | Status: SHIPPED | OUTPATIENT
Start: 2023-12-28 | End: 2024-05-03 | Stop reason: SDUPTHER

## 2023-12-28 ASSESSMENT — PATIENT HEALTH QUESTIONNAIRE - PHQ9
1. LITTLE INTEREST OR PLEASURE IN DOING THINGS: NOT AT ALL
SUM OF ALL RESPONSES TO PHQ9 QUESTIONS 1 AND 2: 0
2. FEELING DOWN, DEPRESSED OR HOPELESS: NOT AT ALL

## 2023-12-28 NOTE — PROGRESS NOTES
FUV for type 2 DM, PHPT, and osteoporosis. LV with me 2023.    History of Present Illness  73 y.o. female with hx of T2DM, stroke, CKD stage 3, hypertension, polycythemia, left sided breast cancer s/p lumpectomy and radiation in , hyperandrogenism, PHPT, and osteoporosis, here for follow up.    <Hyperandrogenism>  Hx of elevated testosterone levels since 2018 (> 200 ng/dL).  Has some facial hirsutism, mustache mainly, hirsutism has been noticed for years it has not been worsening  Trasnvaginal US (2018): no lesions on right ovary, left ovary was unable to be seen  CT Abd/Plevis (2018): no adrenal lesions  Was recommended TAHBSO by Dr. Ang Cruz as well as myself, but has been declining surgery.  Has polycemia requiring periodic phlebotomy (sees Dr. Tobias-->has appointment with new hematologist in March)     <PHPT>  Hx of hyperparathyroidism and had parathyroid surgery in 2018 (Dr. Pradhan).  L superior parathyroid resected-hypercellular on pathology.  Calcium levels continue to be elevated with non-suppressed PTH.  DXA (2019) : Osteoporosis (distal radius T-score: -2.8)  DXA (2022): Osteoporosis (T-score 1/3 distal radius: -2.8, L neck -1.8, Spine +0.2)  Significant decrease in spine BMD per LSC (*L2-L4 compared in 2019 vs L1-L3 in )  On anastrazole since  for breast cancer.  Not on any antiosteoporosis medication.    <Type 2 DM>  Dx: approx 2018  HbA1c:  6.8% (2023), 6.6% (2023),7.3% (2023), 6.6% (2023), 6.4% (23). 7.1% (2022), 6.1% (2022), 9.8% (2021), 8.5% (10/2021)  Current regimen: Trulicity 1.5 mg/week  Past medications: glipizide and tradjenta  Complications: nephropathy  Comorbidities: breast cancer, HTN, TIA, CKD stage 3    SMBG: twice a day  Fastins-170s  Dinner: 150s-180s    ROS  General: no fever or chills  CV: no chest pain   Respiratory: no shortness of breath  MSK: no lower extremity edema  Neuro: no headache or dizziness  See HPI for  Endocrine ROS    Past Medical History:   Diagnosis Date    Abnormal findings on diagnostic imaging of other specified body structures 12/08/2018    Abnormal chest CT    Abnormal levels of other serum enzymes 01/14/2017    Elevated liver enzymes    Body mass index (BMI) 21.0-21.9, adult 11/24/2021    BMI 21.0-21.9, adult    Body mass index (BMI) 22.0-22.9, adult 06/06/2022    Body mass index (BMI) of 22.0 to 22.9 in adult    Body mass index (BMI) 23.0-23.9, adult 07/07/2021    BMI 23.0-23.9, adult    Body mass index (BMI) 24.0-24.9, adult 09/06/2019    BMI 24.0-24.9, adult    Encounter for therapeutic drug level monitoring 03/12/2018    Encounter for monitoring anastrozole therapy    Essential (primary) hypertension 08/24/2017    Uncontrolled hypertension    Other cerebral infarction due to occlusion or stenosis of small artery (CMS/HCC) 04/08/2019    Lacunar infarction    Other conditions influencing health status 01/22/2018    Malignant neoplasm of left female breast, unspecified site of breast    Other specified disorders of breast 04/26/2016    Postoperative breast asymmetry    Personal history of diseases of the skin and subcutaneous tissue 11/23/2013    History of atopic dermatitis    Personal history of other specified conditions 11/07/2015    History of chest pain    Personal history of transient ischemic attack (TIA), and cerebral infarction without residual deficits 12/07/2019    History of transient cerebral ischemia    Unspecified lump in the left breast, unspecified quadrant 08/24/2017    Breast mass, left       Past Surgical History:   Procedure Laterality Date    BREAST BIOPSY  02/26/2015    Biopsy Breast Percutaneous Needle Core    COLONOSCOPY  05/20/2017    Complete Colonoscopy    GALLBLADDER SURGERY  02/09/2015    Gallbladder Surgery    MR HEAD ANGIO WO IV CONTRAST  11/12/2021    MR HEAD ANGIO WO IV CONTRAST 11/12/2021 [facility] CLINICAL LEGACY    OTHER SURGICAL HISTORY  04/23/2018    Parathyroid  Resection    OTHER SURGICAL HISTORY  04/27/2015    Left Breast Partial Mastectomy    SENTINEL LYMPH NODE BIOPSY  04/27/2015    [place] Lymph Node Biopsy       Social History     Socioeconomic History    Marital status:      Spouse name: Not on file    Number of children: Not on file    Years of education: Not on file    Highest education level: Not on file   Occupational History    Not on file   Tobacco Use    Smoking status: Never    Smokeless tobacco: Never   Substance and Sexual Activity    Alcohol use: Never    Drug use: Never    Sexual activity: Not on file   Other Topics Concern    Not on file   Social History Narrative    Not on file     Social Determinants of Health     Financial Resource Strain: Not on file   Food Insecurity: Not on file   Transportation Needs: Not on file   Physical Activity: Not on file   Stress: Not on file   Social Connections: Not on file   Intimate Partner Violence: Not on file   Housing Stability: Not on file       Physical Exam   vitals were not taken for this visit.   General: not in acute distress  HEENT: ZANDER GARZON  Thyroid: no goiter  Neuro: alert and oriented x 3    Current Outpatient Medications   Medication Sig Dispense Refill    allopurinol (Zyloprim) 300 mg tablet Take 1 tablet (300 mg) by mouth once daily.      amLODIPine (Norvasc) 10 mg tablet Take 1 tablet (10 mg) by mouth once daily. 90 tablet 1    anastrozole (Arimidex) 1 mg tablet Take 1 tablet (1 mg total) by mouth once daily. 90 tablet 0    atorvastatin (Lipitor) 20 mg tablet TAKE 1 TABLET BY MOUTH ONCE  DAILY 90 tablet 3    b complex vitamins capsule Take 1 tablet by mouth once daily.      blood sugar diagnostic (Prodigy No Coding) strip TEST twice a day   dx E11.9      clopidogrel (Plavix) 75 mg tablet Take 1 tablet (75 mg) by mouth once daily.      folic acid (Folvite) 1 mg tablet Take 1 tablet (1 mg) by mouth once daily.      FreeStyle lancets 28 gauge 2 times a day. Use as instructed      lisinopril 40  mg tablet TAKE 1 TABLET BY MOUTH ONCE  DAILY 90 tablet 3    metoprolol succinate XL (Toprol-XL) 100 mg 24 hr tablet Take 1 tablet (100 mg) by mouth once daily. Do not crush or chew. 90 tablet 3    Trulicity 1.5 mg/0.5 mL pen injector INJECT THE CONTENTS OF ONE PEN  SUBCUTANEOUSLY WEEKLY AS  DIRECTED 6 mL 3     No current facility-administered medications for this visit.       Assessment and Plan  73 y.o. female with type 2 diabetes, CKD stage 3, stroke, breast CA s/p lumpectomy and RT (2015), anastrazole, PHPT s/p parathyroidectomy with persistent hypercalcemia, osteoporosis, and hyperandrogenism, here for follow-up.     1. Type 2 diabetes melltius  HbA1c:  6.8% (12/28/2023), 6.6% (09/2023),7.3% (06/2023), 6.6% (2/14/2023), 6.4% (1/24/23), 7.1% (9/6/2022), 6.1% (4/16/2022, 9.8% (11/14/2021), 8.5% (10/2021), 9.4% (02/2021)  Current regimen: Trulicity 1.5 mg/week  Eye exam: LV 10/2023   Urine microalbumin: 1160 ug/mg (09/2023); on lisinopril  Lipids: HDL 72, LDL 57, TG 57 (09/2023) atorvastatin 20 mg    A1c 6.8% last week.  No change in regimen.    PLAN:  -continue Trulicity 1.5 mg/week  -check blood sugars twice a day  -bring glucometer to every visit  -advised patient to let me know if BGs < 80    2. Elevated testosterone  3. Polycythemia  Hx of elevated testosterone levels since 2018 (> 200 ng/dL).  DHEAS 217 ug/dL ( ug/dL).  Has facial hirsutism, mustache mainly for years.   Patient does not have worsening of hirsutism.  Voice has been deeper/a little bit hoarse.  No increase in sexual desire.  Transvaginal US (2018): no lesions on right ovary, left ovary was unable to be seen  CT Abd/Plevis (2018): no adrenal lesions  Has polycythemia requiring periodic phlebotomy (sees Dr. Tobias)    No adrenal lesion on CT on initial evaluation. The elevated testosterone is likely from an ovarian source: ovarian hyperthecosis vs ovarian tumor.   Although she is on anastrazole for hx of breast cancer, the elevated  testosterone provides more substrate for estrogen production. Estradiol in 05/2022 was 33 pg/mL which is higher than expected for a post-menopausal women on anastrazole.   Although hyperandrogen symptoms have not been worsening, would still recommend BSO for this patient given polycythemia and hx of stroke.  Surgery has been discussed multiple times and the rationale above explained, but she continues to declines.    Labs from 1/24/2023  Total testosterone: 259  Estradiol: 35    Labs from 06/2023  Total testosterone: 243  Hematocrit: 45.6    Labs from 12/28/2023  Hematocrit 46.8    Has not established care with Hematologist since Dr. Scott left.  Referral sent by PCP on 12/28 and now has an appointment in March.  Hematocrit from 12/28  slightly elevated.    PLAN:  -check total testosterone, DHEAS  -continue phlebotomy per Hematology     4. Primary Hyperparathyroidism  5. Hx of parathyroidectomy  6. Osteoporosis  S/p parathyroidectomy in 4/2018 (Dr. Pradhan-left superior parathyroid; hypercellular on pathology)  Remains hypercalcemic with non-suppressed PTH.    DXA (2019): [facility]  T-score 1/3 distal radius: -2.8; L neck -1.8, Spine +0.4  DXA (04/2022): [facility]  T-score 1/3 distal radius: -2.8 (-3.8%), L fem neck -1.8 (-0.1), Spine +0.2 (-5.2%*)    On anastrazole since 2015 for breast cancer.    Current regimen: none, did not tolerate risedronate (had hallucinations).   D3 supplementation: 400 IU  Calcium intake: no supplements, eats yogurt 2-3 times per week    Labs from 5/28/2022  Ca: 11.0  GFR: 44  24H urine calcium: 143 mg/day  Ca/creatinine ratio: 0.022    Labs from 06/2023  Ca 11.0  PTH 75  D25OH  Cre 1.59   eGFR 34  D25OH 43    Discussed repeat parathyroidectomy but patient declines.  Patient did not tolerate risedronate. Reports having hallucinations.  Currently, kidney function is not adequate for bisphosphonates.  Denosumab is an option, however, if she needs to stop denosumab for any reason,  transitioning her to an antiresorptive may not be possible (cannot use PHT analogs due to PHPT, would avoid romosozumab due to polycythemia and hx of stroke).    Discussed at length with patient regarding treatment of osteoporosis.  Discussed denosumab and the rare side effects of ONJ and atypical femur fracture.  She is not opposed to starting denosumab. She asked appropriate questions regarding potential interactions with her other medications and affects on her other chronic conditions.    Saw dentist in August 2023. Planned for a root canal procedure.   Scheduled for the follow-up with the dentist this month.  Will discuss prolia again after the procedure.    PLAN:  -check RFP, D25OH, PTH  -continue vitamin D supplementation  -encouraged to keep hydrated  -due for DXA 04/19/2024  -Dentist: Dr. Ivette France (phone: #[phone], fax:#[phone])    Will call with results.  Follow-up in 4 months (DXA prior)    Patient does not have a cell phone.  OK to mail results if she cannot be reached.

## 2023-12-28 NOTE — PROGRESS NOTES
I saw and evaluated the patient. I personally obtained the key and critical portions of the history and physical exam or was physically present for key and critical portions performed by the resident/fellow. I reviewed the resident/fellow's documentation and discussed the patient with the resident/fellow. I agree with the resident/fellow's medical decision making as documented in the note.    Scarlett Bangura MD

## 2023-12-28 NOTE — PROGRESS NOTES
MRN: [id number]     Subjective   Patient ID: Saige Yates is a 73 y.o. female who presents for Follow-up and Med Refill.  HPI  Saige Yates is a 72 yo female patient with CVA, HLD, DM, HTN, Gout, breast cancer and polycythemia and osteoporosis who presented for follow up visit. She has no complaints today, feels more anxious when she comes to the office. Her BP at home has been within normal range. Her BG at home has been variable with fasting in 150 to 170 and evening read around 150 to 190. She admitted having more polyuria and nocturia, no dysuria, no fever. She used to follow up with hematology for regular phlebotomies, but lost to follow up since her hematologist left a year ago.     Review of Systems  - CONSTITUTIONAL: Denies weight loss, fever and chills.  - HEENT: Denies changes in vision and hearing.  - RESPIRATORY: Denies SOB and cough.  - CV: Denies palpitations and CP.   - GI: Denies abdominal pain, nausea, vomiting and diarrhea.  - : Denies dysuria admits Polyuria   - MSK: Denies myalgia and  joint pain.  - SKIN: Denies rash and pruritus.  - NEUROLOGICAL: Denies headache and syncope.  - PSYCHIATRIC: Denies recent changes in mood. Denies anxiety and depression.   Objective   Physical Exam  Constitutional: patient is alert and cooperative with exam  skin: dry and warm  Eyes: EOMI and clear sclera  ENMT: moist mucous membranes  Head/Neck: normal neck, no JVD and trachea midline  Respiratory/Thorax: Clear to auscultation bilaterally, no wheezing or crackles appreciated  Cardiovascular: regular rate and rhythm, S1 and S2 present, no murmurs heard  Gastrointestinal: bowel sounds present, no pain or tenderness upon palpation, soft and nondistended  Extremities: +2 radial, posterior tibial, and pedal pulse, no lower extremity edema noted  Neurological: A&Ox3 no focal deficit   Visit Vitals  /80          Assessment/Plan     Saige Yates is a 72 yo female patient with CVA, HLD, DM, HTN, Gout, breast  cancer and polycythemia and osteoporosis who presented for follow up visit.     #Polyuria  - sent a UA with reflex    #HTN  - BP under control 110/80   - continue Amlodipine 10 mg daily, lisinopril 40 mg daily     #CVA   #HLD  - continue Plavix daily  - Atorvastatin 20 mg daily     #DM  - last HbA1c 6.6  - BG at home has been between 150 and 190  - admitting nocturia  - repeat HbA1c in office today 6.8%  - on weekly trulicity 1.5 mg     #History of Breast cancer  - on daily anastrozole   - next mammogram due in June 2023     #Polycythemia  - used to have regular phlebotomies with hematology but haven't followed with them since 12/2022 when her hematologist left    - will send a new referral   - repeat CBC of Htc>45 will need phlebotomy immediately     #Gout   - Continue Allopurinol 300 mg daily  - no recent flares    #Osteoporosis  - Plan for Prolia managed by endocrinology    - couldn't tolerate Bisphosphonate     #Health maintenance:  - Mammogram last done June 2023 > next due in June 2024  - Dexa Scan last done 4/2022 > showed osteoporosis at left forearm level   - Colonoscopy last done 2013 > due now will order  - Immunizations; due for Tdap (last done in 2013) will do at pharmacy  - Lab: uptodate on [facility]     Dispo: F/U in  3 months

## 2023-12-29 ENCOUNTER — LAB (OUTPATIENT)
Dept: LAB | Facility: LAB | Age: 73
End: 2023-12-29
Payer: MEDICARE

## 2023-12-29 DIAGNOSIS — R35.89 POLYURIA: ICD-10-CM

## 2023-12-29 DIAGNOSIS — D75.1 POLYCYTHEMIA: ICD-10-CM

## 2023-12-29 LAB
APPEARANCE UR: CLEAR
BILIRUB UR STRIP.AUTO-MCNC: NEGATIVE MG/DL
COLOR UR: ABNORMAL
GLUCOSE UR STRIP.AUTO-MCNC: NEGATIVE MG/DL
KETONES UR STRIP.AUTO-MCNC: NEGATIVE MG/DL
LEUKOCYTE ESTERASE UR QL STRIP.AUTO: ABNORMAL
NITRITE UR QL STRIP.AUTO: NEGATIVE
PH UR STRIP.AUTO: 6 [PH]
PROT UR STRIP.AUTO-MCNC: ABNORMAL MG/DL
RBC # UR STRIP.AUTO: NEGATIVE /UL
RBC #/AREA URNS AUTO: NORMAL /HPF
SP GR UR STRIP.AUTO: 1.01
UROBILINOGEN UR STRIP.AUTO-MCNC: <2 MG/DL
WBC #/AREA URNS AUTO: NORMAL /HPF

## 2023-12-29 PROCEDURE — 81001 URINALYSIS AUTO W/SCOPE: CPT

## 2023-12-29 PROCEDURE — 87086 URINE CULTURE/COLONY COUNT: CPT

## 2023-12-31 LAB — BACTERIA UR CULT: NORMAL

## 2024-01-02 ENCOUNTER — PATIENT OUTREACH (OUTPATIENT)
Dept: HEMATOLOGY/ONCOLOGY | Facility: HOSPITAL | Age: 74
End: 2024-01-02
Payer: MEDICARE

## 2024-01-02 NOTE — PROGRESS NOTES
RN received referral from Dr. Bangura for polycythemia. Patient used to see Dr. Tobias for this and she used to get phlebotomies at one time. Patient instructed on new provider Osmin Steen, date 3/4 and time of 10am at the [facility] in [address]. Patient voiced understanding. Call back number reviewed.

## 2024-01-03 ENCOUNTER — OFFICE VISIT (OUTPATIENT)
Dept: ENDOCRINOLOGY | Facility: CLINIC | Age: 74
End: 2024-01-03
Payer: MEDICARE

## 2024-01-03 VITALS
DIASTOLIC BLOOD PRESSURE: 86 MMHG | SYSTOLIC BLOOD PRESSURE: 128 MMHG | RESPIRATION RATE: 18 BRPM | HEART RATE: 107 BPM | BODY MASS INDEX: 19.6 KG/M2 | TEMPERATURE: 97.5 F | WEIGHT: 114.1 LBS

## 2024-01-03 DIAGNOSIS — R79.89 ELEVATED TESTOSTERONE LEVEL IN FEMALE: ICD-10-CM

## 2024-01-03 DIAGNOSIS — E55.9 VITAMIN D DEFICIENCY: ICD-10-CM

## 2024-01-03 DIAGNOSIS — E21.3 HYPERPARATHYROIDISM (MULTI): Primary | ICD-10-CM

## 2024-01-03 PROCEDURE — 4010F ACE/ARB THERAPY RXD/TAKEN: CPT | Performed by: STUDENT IN AN ORGANIZED HEALTH CARE EDUCATION/TRAINING PROGRAM

## 2024-01-03 PROCEDURE — 3079F DIAST BP 80-89 MM HG: CPT | Performed by: STUDENT IN AN ORGANIZED HEALTH CARE EDUCATION/TRAINING PROGRAM

## 2024-01-03 PROCEDURE — 99215 OFFICE O/P EST HI 40 MIN: CPT | Performed by: STUDENT IN AN ORGANIZED HEALTH CARE EDUCATION/TRAINING PROGRAM

## 2024-01-03 PROCEDURE — 1126F AMNT PAIN NOTED NONE PRSNT: CPT | Performed by: STUDENT IN AN ORGANIZED HEALTH CARE EDUCATION/TRAINING PROGRAM

## 2024-01-03 PROCEDURE — 1036F TOBACCO NON-USER: CPT | Performed by: STUDENT IN AN ORGANIZED HEALTH CARE EDUCATION/TRAINING PROGRAM

## 2024-01-03 PROCEDURE — 3074F SYST BP LT 130 MM HG: CPT | Performed by: STUDENT IN AN ORGANIZED HEALTH CARE EDUCATION/TRAINING PROGRAM

## 2024-01-03 PROCEDURE — 1159F MED LIST DOCD IN RCRD: CPT | Performed by: STUDENT IN AN ORGANIZED HEALTH CARE EDUCATION/TRAINING PROGRAM

## 2024-01-03 ASSESSMENT — PAIN SCALES - GENERAL: PAINLEVEL: 0-NO PAIN

## 2024-01-03 NOTE — PATIENT INSTRUCTIONS
Please have blood work done  Please schedule bone density scan at [facility] for end of 2024   [facility] Radiology for [phone]

## 2024-02-02 ENCOUNTER — TELEPHONE (OUTPATIENT)
Dept: ENDOCRINOLOGY | Facility: CLINIC | Age: 74
End: 2024-02-02
Payer: MEDICARE

## 2024-02-02 DIAGNOSIS — E11.29 TYPE 2 DIABETES MELLITUS WITH MICROALBUMINURIA, WITHOUT LONG-TERM CURRENT USE OF INSULIN (MULTI): ICD-10-CM

## 2024-02-02 DIAGNOSIS — R80.9 TYPE 2 DIABETES MELLITUS WITH MICROALBUMINURIA, WITHOUT LONG-TERM CURRENT USE OF INSULIN (MULTI): ICD-10-CM

## 2024-02-02 RX ORDER — DULAGLUTIDE 1.5 MG/.5ML
INJECTION, SOLUTION SUBCUTANEOUS
Qty: 6 ML | Refills: 1 | Status: SHIPPED | OUTPATIENT
Start: 2024-02-02 | End: 2024-05-24 | Stop reason: ALTCHOICE

## 2024-02-02 NOTE — TELEPHONE ENCOUNTER
Left a VM on refill line that she needs a refill of Trulicity to go to [facility] on file. Last note indicates she is on 1.5mg

## 2024-02-05 ENCOUNTER — APPOINTMENT (OUTPATIENT)
Dept: HEMATOLOGY/ONCOLOGY | Facility: CLINIC | Age: 74
End: 2024-02-05
Payer: MEDICARE

## 2024-02-12 DIAGNOSIS — E11.29 TYPE 2 DIABETES MELLITUS WITH MICROALBUMINURIA, WITHOUT LONG-TERM CURRENT USE OF INSULIN (MULTI): Primary | ICD-10-CM

## 2024-02-12 DIAGNOSIS — R80.9 TYPE 2 DIABETES MELLITUS WITH MICROALBUMINURIA, WITHOUT LONG-TERM CURRENT USE OF INSULIN (MULTI): Primary | ICD-10-CM

## 2024-02-12 RX ORDER — DULAGLUTIDE 0.75 MG/.5ML
0.75 INJECTION, SOLUTION SUBCUTANEOUS
Qty: 6 ML | Refills: 1 | Status: SHIPPED | OUTPATIENT
Start: 2024-02-12 | End: 2024-05-24 | Stop reason: SDUPTHER

## 2024-02-12 RX ORDER — DULAGLUTIDE 3 MG/.5ML
3 INJECTION, SOLUTION SUBCUTANEOUS
Qty: 6 ML | Refills: 1 | Status: SHIPPED | OUTPATIENT
Start: 2024-02-12 | End: 2024-02-12 | Stop reason: ENTERED-IN-ERROR

## 2024-02-12 NOTE — TELEPHONE ENCOUNTER
Saige has some confusion on her Trulicity medication. She called again about it. The pharmacy is telling her they don't have it and she believes that it hasn't been sent. The issue is the backorder, but she is not understanding that. She may need an alternative med - she has been without trulicity for some time.

## 2024-02-12 NOTE — TELEPHONE ENCOUNTER
Called patient left her a voice message.   I let her know that I will trying sending [facility] the 3 mg dose of Trulicity.  I will also send her a [facility] message with the same information.

## 2024-02-12 NOTE — TELEPHONE ENCOUNTER
In reviewing the patient's recent weight trend, she has lost 10 lbs over the last year and her BMI is now 19.6.  I will prescribe the 0.75 mg dose of Trulicity instead of the 3 mg as I do not want to promote more weight loss.    I was able to reach the patient and speak to her.  Relayed the above to her.

## 2024-02-16 NOTE — TELEPHONE ENCOUNTER
Called again in regard. She saw your message, but I don't know if she understands that you sent a different dosage. She was asking for you to take her off this medication and give her something else. Maybe you can try speaking with Jo, her daughter [phone]

## 2024-02-16 NOTE — TELEPHONE ENCOUNTER
Spoke to patient. Reported that she still has not received Trulicity.  Called [facility]. They shipped her Trulicity 0.75 mg dose on 2/15 and it was delivered to her side door on 2/16 (today).  Called patient back and had her check her side door. She confirmed receipt of her Trulicity.

## 2024-02-20 ENCOUNTER — DOCUMENTATION (OUTPATIENT)
Dept: HEMATOLOGY/ONCOLOGY | Facility: HOSPITAL | Age: 74
End: 2024-02-20
Payer: MEDICARE

## 2024-02-20 NOTE — PROGRESS NOTES
Diagnosis polycythemia(2010). History includes-secondary polycythemia(due to increased testosterone levels), elevated testosterone levels in a female, diabetes, intracranial atherosclerosis, breast cancer S/P radiation, parathyroid surgery(4/2018),   HTN, TIA/CVA, DLD, osteoporosis, Vitamin D deficiency, hypercalcemia, gout, Hirsutism.  Patient has had a phlebotomy in the past.  Former patient of Dr. Holcomb, last visit 4/2/23. Previously saw Dr. Reeves.  Last labs 12/28/23 RBC 5.37, HGB 14.4, Hematocrit 46.8, MCHC 30.8, RDW 15.6                  9/26/23 BUN 22, Creat 1.42                  3/2015 JAK2 NEGATIVE (IN DR. HOLCOMB'S NOTE).  Patient was left a message with details for appointment scheduled on 3/4/24. Call [phone] to cancel or reschedule this appointment.

## 2024-03-01 ENCOUNTER — TELEPHONE (OUTPATIENT)
Dept: HEMATOLOGY/ONCOLOGY | Facility: HOSPITAL | Age: 74
End: 2024-03-01
Payer: MEDICARE

## 2024-03-01 NOTE — TELEPHONE ENCOUNTER
On 2/29 patient called me and was talking about a bone density test and asking when it was. RN started looking at patient's appointment desk to see what she was talking about. RN informed her of her appointment including the benign heme appointment on 3/4 with Celsa. Patient kept saying why did she have so many appointments. I tried to go through each one and what it was for but this patient sounded very confused. She insisted that I cancel the appointment on 3/4 with Celsa. RN sent a message to Dr. Bangura she had a person from the office call the patient's son. RN called the contact number later and left a message asking if I should cancel the appointment but no one ever called back. RN will cancel appointment on 3/4.

## 2024-03-04 ENCOUNTER — APPOINTMENT (OUTPATIENT)
Dept: HEMATOLOGY/ONCOLOGY | Facility: CLINIC | Age: 74
End: 2024-03-04
Payer: MEDICARE

## 2024-03-07 ENCOUNTER — TELEPHONE (OUTPATIENT)
Dept: PRIMARY CARE | Facility: CLINIC | Age: 74
End: 2024-03-07
Payer: MEDICARE

## 2024-03-15 ENCOUNTER — OFFICE VISIT (OUTPATIENT)
Dept: PRIMARY CARE | Facility: CLINIC | Age: 74
End: 2024-03-15
Payer: MEDICARE

## 2024-03-15 VITALS — BODY MASS INDEX: 19.24 KG/M2 | WEIGHT: 112 LBS

## 2024-03-15 DIAGNOSIS — E11.29 TYPE 2 DIABETES MELLITUS WITH MICROALBUMINURIA, WITHOUT LONG-TERM CURRENT USE OF INSULIN (MULTI): ICD-10-CM

## 2024-03-15 DIAGNOSIS — E78.5 HYPERLIPIDEMIA, UNSPECIFIED HYPERLIPIDEMIA TYPE: Primary | ICD-10-CM

## 2024-03-15 DIAGNOSIS — D75.1 POLYCYTHEMIA: ICD-10-CM

## 2024-03-15 DIAGNOSIS — I47.10 SVT (SUPRAVENTRICULAR TACHYCARDIA) (CMS-HCC): ICD-10-CM

## 2024-03-15 DIAGNOSIS — M81.0 OSTEOPOROSIS WITHOUT CURRENT PATHOLOGICAL FRACTURE, UNSPECIFIED OSTEOPOROSIS TYPE: ICD-10-CM

## 2024-03-15 DIAGNOSIS — I10 ESSENTIAL HYPERTENSION: ICD-10-CM

## 2024-03-15 DIAGNOSIS — R80.9 TYPE 2 DIABETES MELLITUS WITH MICROALBUMINURIA, WITHOUT LONG-TERM CURRENT USE OF INSULIN (MULTI): ICD-10-CM

## 2024-03-15 DIAGNOSIS — E55.9 VITAMIN D DEFICIENCY: ICD-10-CM

## 2024-03-15 PROCEDURE — 4010F ACE/ARB THERAPY RXD/TAKEN: CPT | Performed by: INTERNAL MEDICINE

## 2024-03-15 PROCEDURE — 99215 OFFICE O/P EST HI 40 MIN: CPT | Performed by: INTERNAL MEDICINE

## 2024-03-15 PROCEDURE — 1036F TOBACCO NON-USER: CPT | Performed by: INTERNAL MEDICINE

## 2024-03-15 PROCEDURE — 1159F MED LIST DOCD IN RCRD: CPT | Performed by: INTERNAL MEDICINE

## 2024-03-15 PROCEDURE — 93000 ELECTROCARDIOGRAM COMPLETE: CPT | Performed by: INTERNAL MEDICINE

## 2024-03-15 ASSESSMENT — ENCOUNTER SYMPTOMS
FEVER: 0
ABDOMINAL DISTENTION: 0
HEMATURIA: 0
NECK STIFFNESS: 0
ACTIVITY CHANGE: 0
MYALGIAS: 0
ARTHRALGIAS: 0
DIARRHEA: 0
BACK PAIN: 0
ABDOMINAL PAIN: 0
NAUSEA: 0
UNEXPECTED WEIGHT CHANGE: 0
WOUND: 0
DIZZINESS: 0
WEAKNESS: 0
SLEEP DISTURBANCE: 0
NECK PAIN: 0
NERVOUS/ANXIOUS: 0
CHILLS: 0
CHEST TIGHTNESS: 0
FREQUENCY: 0
PALPITATIONS: 0
HEADACHES: 0
JOINT SWELLING: 0
DYSURIA: 0
APNEA: 0
FATIGUE: 0
CONSTIPATION: 0
TREMORS: 0
BLOOD IN STOOL: 0
COUGH: 0
DIAPHORESIS: 0
APPETITE CHANGE: 0
DIFFICULTY URINATING: 0
FLANK PAIN: 0
VOMITING: 0
SHORTNESS OF BREATH: 0
WHEEZING: 0

## 2024-03-15 ASSESSMENT — PATIENT HEALTH QUESTIONNAIRE - PHQ9
1. LITTLE INTEREST OR PLEASURE IN DOING THINGS: NOT AT ALL
2. FEELING DOWN, DEPRESSED OR HOPELESS: NOT AT ALL
SUM OF ALL RESPONSES TO PHQ9 QUESTIONS 1 AND 2: 0

## 2024-03-15 NOTE — PROGRESS NOTES
Subjective   Patient ID: Saige Yates is a 73 y.o. female who presents for No chief complaint on file..  Saige Yates, a 72 y/o F with PMHX of HTN, HLD, CVA, T2DM, CKD stage 3, gout, polycythemia, left sided breast cancer s/p lumpectomy and radiation in 2015, hyperandrogenism, PHPT, and osteoporosis presented to the clinic for a visit due to concern for elevated BP and abnormal glucose levels reported by her nurse visit ([facility]) at home. Current BP meds at home include Metoprolol Succinate 100 mg once daily  and Lisinopril 40mg once daily. She reported medication compliance.   Patient denies any acute distress or complaints including headache, visual disturbances, syncope, palpitations, Chest pain, or SOB. She did appear clammy but remained and alert. She continued to deny any symptoms. Per Patient, She reported that she took all her home meds including her metoprolol succinate 100mg once daily.  We could not obtain good BP reading but did check her pulse and O2 sat patient was tachycardic in the 140s. ECG was completed showed Supraventricular tachycardia with 145bpm, Qt 304, Qtc 47.   At that point, called 911 for further assessment and management in the ED/inpatient admission or obs. Consider Cardiology evaluation.         Review of Systems   Constitutional:  Negative for activity change, appetite change, chills, diaphoresis, fatigue, fever and unexpected weight change.   Eyes:  Negative for visual disturbance.   Respiratory:  Negative for apnea, cough, chest tightness, shortness of breath and wheezing.    Cardiovascular:  Negative for chest pain, palpitations and leg swelling.   Gastrointestinal:  Negative for abdominal distention, abdominal pain, blood in stool, constipation, diarrhea, nausea and vomiting.   Endocrine: Negative for cold intolerance and heat intolerance.   Genitourinary:  Negative for decreased urine volume, difficulty urinating, dysuria, enuresis, flank pain, frequency, hematuria  and urgency.   Musculoskeletal:  Negative for arthralgias, back pain, gait problem, joint swelling, myalgias, neck pain and neck stiffness.   Skin:  Negative for rash and wound.   Neurological:  Negative for dizziness, tremors, syncope, weakness and headaches.   Psychiatric/Behavioral:  Negative for behavioral problems and sleep disturbance. The patient is not nervous/anxious.        Objective   Physical Exam  Constitutional:       General: She is in acute distress.      Appearance: She is ill-appearing.      Comments: Appear diaphoretic. Cold and clammy    HENT:      Head: Normocephalic and atraumatic.   Cardiovascular:      Rate and Rhythm: Tachycardia present.   Pulmonary:      Effort: Pulmonary effort is normal.   Abdominal:      General: Abdomen is flat. Bowel sounds are normal.      Palpations: Abdomen is soft.   Musculoskeletal:         General: Normal range of motion.   Skin:     Coloration: Skin is not jaundiced or pale.      Comments: Cold and clammy   Neurological:      Mental Status: She is alert and oriented to person, place, and time.   Psychiatric:         Mood and Affect: Mood normal.         Behavior: Behavior normal.         Thought Content: Thought content normal.         Judgment: Judgment normal.         Assessment/Plan   Problem List Items Addressed This Visit       Vitamin D deficiency    Diabetes mellitus (CMS/HCC) - Primary    Polycythemia    Osteoporosis    Hyperlipidemia    Essential hypertension     Saige Yates, a 72 y/o F with PMHX of HTN, HLD, CVA, T2DM, CKD stage 3, gout, polycythemia, left sided breast cancer s/p lumpectomy and radiation in 2015, hyperandrogenism, PHPT, and osteoporosis presented to the clinic for a visit due to concern for elevated BP and abnormal glucose levels reported by her nurse visit ([facility]) at home. Current BP meds at home include Metoprolol Succinate 100 mg once daily  and Lisinopril 40mg once daily. She reported medication compliance.   Patient  denies any acute distress or complaints including headache, visual disturbances, syncope, palpitations, Chest pain, or SOB. She did appear clammy but remained and alert. She continued to deny any symptoms. Per Patient, She reported that she took all her home meds including her metoprolol succinate 100mg once daily.  We could not obtain good BP reading but did check her pulse and O2 sat patient was tachycardic in the 140s. ECG was completed showed Supraventricular tachycardia with 145bpm, Qt 304, Qtc 47.   At that point, called 911 for further assessment and management in the ED/inpatient admission or obs. Consider Cardiology evaluation.

## 2024-03-15 NOTE — PROGRESS NOTES
I reviewed the resident/fellow's documentation and discussed the patient with the resident/fellow. I agree with the resident/fellow's medical decision making as documented in the note.  As the attending physician, I certify that I personally reviewed the patient's history and personally examined the patient to confirm the physical findings described above, and that I reviewed the relevant imaging studies and available reports. I also discussed the differential diagnosis and all of the proposed management plans with the patient and individuals accompanying the patient to this visit. They had the opportunity to ask questions about the proposed management plans and to have those questions answered.     MDM  1) COMPLEXITY: MORE THAN 1 STABLE CHRONIC CONDITION ADDRESSED OR 1 ACUTE ILLNESS ADDRESSED   2)DATA: TESTS INTERPRETED AND OR ORDERED, TOOK INDEPENDENT HISTORY OR RECORDS REVIEWED  3)RISK: High RISK DUE TO NATURE OF MEDICAL CONDITIONS/COMORBIDITY OR MEDICATIONS ORDERED OR SURGICAL OR PROCEDURE REFERRAL    Scarlett Bangura MD

## 2024-03-18 ENCOUNTER — TELEPHONE (OUTPATIENT)
Dept: ENDOCRINOLOGY | Facility: CLINIC | Age: 74
End: 2024-03-18
Payer: MEDICARE

## 2024-03-18 NOTE — TELEPHONE ENCOUNTER
Please let the patient know that I received her message and that I am able to see her chart from during her hospital admission.  I do not think there are any changes that I need to make from my end right now. Please let her know that I appreciate her calling me and that I will let her know if there is anything I need to update her on.

## 2024-03-18 NOTE — TELEPHONE ENCOUNTER
"Left a VM today at 1:40 on the refill line stating she wanted you to know she was admitted to the hospital of Friday and they are going to be sending her back to [place] on Wednesday. She ended the message stating \"You can call me\"  "

## 2024-03-20 ENCOUNTER — OFFICE VISIT (OUTPATIENT)
Dept: PRIMARY CARE | Facility: CLINIC | Age: 74
End: 2024-03-20
Payer: MEDICARE

## 2024-03-20 VITALS — DIASTOLIC BLOOD PRESSURE: 120 MMHG | SYSTOLIC BLOOD PRESSURE: 180 MMHG | WEIGHT: 114 LBS | BODY MASS INDEX: 19.58 KG/M2

## 2024-03-20 DIAGNOSIS — N18.30 CHRONIC RENAL IMPAIRMENT, STAGE 3 (MODERATE), UNSPECIFIED WHETHER STAGE 3A OR 3B CKD (MULTI): ICD-10-CM

## 2024-03-20 DIAGNOSIS — E78.5 HYPERLIPIDEMIA, UNSPECIFIED HYPERLIPIDEMIA TYPE: ICD-10-CM

## 2024-03-20 DIAGNOSIS — D75.1 POLYCYTHEMIA: Primary | ICD-10-CM

## 2024-03-20 DIAGNOSIS — I47.10 SVT (SUPRAVENTRICULAR TACHYCARDIA) (CMS-HCC): ICD-10-CM

## 2024-03-20 DIAGNOSIS — I10 ESSENTIAL HYPERTENSION: ICD-10-CM

## 2024-03-20 DIAGNOSIS — R80.9 TYPE 2 DIABETES MELLITUS WITH MICROALBUMINURIA, WITHOUT LONG-TERM CURRENT USE OF INSULIN (MULTI): ICD-10-CM

## 2024-03-20 DIAGNOSIS — E11.29 TYPE 2 DIABETES MELLITUS WITH MICROALBUMINURIA, WITHOUT LONG-TERM CURRENT USE OF INSULIN (MULTI): ICD-10-CM

## 2024-03-20 PROCEDURE — 4010F ACE/ARB THERAPY RXD/TAKEN: CPT | Performed by: INTERNAL MEDICINE

## 2024-03-20 PROCEDURE — 99214 OFFICE O/P EST MOD 30 MIN: CPT | Performed by: INTERNAL MEDICINE

## 2024-03-20 PROCEDURE — 1036F TOBACCO NON-USER: CPT | Performed by: INTERNAL MEDICINE

## 2024-03-20 PROCEDURE — 1159F MED LIST DOCD IN RCRD: CPT | Performed by: INTERNAL MEDICINE

## 2024-03-20 PROCEDURE — 3080F DIAST BP >= 90 MM HG: CPT | Performed by: INTERNAL MEDICINE

## 2024-03-20 PROCEDURE — 3077F SYST BP >= 140 MM HG: CPT | Performed by: INTERNAL MEDICINE

## 2024-03-20 ASSESSMENT — PATIENT HEALTH QUESTIONNAIRE - PHQ9
SUM OF ALL RESPONSES TO PHQ9 QUESTIONS 1 AND 2: 0
1. LITTLE INTEREST OR PLEASURE IN DOING THINGS: NOT AT ALL
2. FEELING DOWN, DEPRESSED OR HOPELESS: NOT AT ALL

## 2024-03-20 NOTE — PROGRESS NOTES
I reviewed the resident/fellow's documentation and discussed the patient with the resident/fellow. I agree with the resident/fellow's medical decision making as documented in the note.  As the attending physician, I certify that I personally reviewed the patient's history and personally examined the patient to confirm the physical findings described above, and that I reviewed the relevant imaging studies and available reports. I also discussed the differential diagnosis and all of the proposed management plans with the patient and individuals accompanying the patient to this visit. They had the opportunity to ask questions about the proposed management plans and to have those questions answered.     Scarlett Bangura MD

## 2024-03-20 NOTE — PROGRESS NOTES
Subjective   Patient ID: Saige Yates is a 73 y.o. female who presents for Follow-up.  Saige Yates, a 72 y/o F with PMHX of HTN, HLD, CVA, T2DM, CKD stage 3, gout, polycythemia, left sided breast cancer s/p lumpectomy and radiation in 2015, hyperandrogenism, PHPT, and osteoporosis presented to the clinic for hospital discharge follow-up.  Patient was seen here at our clinic 3 days ago where she presented initially for concern of elevated blood pressure at that time she was found to be tachycardic in the 140s EKG was obtained and showed SVT.  Squad was called and and the patien was taken to [facility].  At that point she received adenosine after  unsuccessful vagal maneuvers.  Cardiology was consulted at that time and recommended to follow-up outpatient.    Per chart/ED NOTE   73-year-old female comes in with signs and symptoms consistent with SVT.  Blood pressure is stable.  Heart rate elevated in the 140s.  Persistent.  EKG appears regular.  Consistent with SVT.  We trialed vagal maneuvers without success.  Will give dose of adenosine.  No signs of PE.  Low suspicion for ACS.  Patient asymptomatic besides the elevated heart rate.  EKG consistent with SVT.  From a laboratory standpoint signs of dehydration with elevated creatinine level.  BUN of 32.  No leukocytosis.  Chest x-ray showed no acute findings.  We attempted vagal maneuvers without any resolution of her symptoms.   We then gave a dose of adenosine with resolution of her SVT.  Patient went into sinus rhythm.  Remained asymptomatic.  Heart rate was stable.  I spoke to cardiology Dr. hernandez, agrees there is no indication for admission at this time.  Can follow-up as an outpatient with cardiology/primary care.  Patient made aware the results.  Patient discharged in stable condition with strict return precautions.      Today at clinic patient feels well denies any acute distress concerns or new complaints she denies any fever chills sweats,chest pain  shortness of breath visual disturbances syncope palpitation nausea vomiting diarrhea or altered mentation, melena hematochezia or urinary symptoms.  She did report that she has been drinking enough water but we did emphasize again on the importance of drinking water and staying hydrated.           Review of Systems    Objective   Physical Exam    Assessment/Plan   Problem List Items Addressed This Visit       Diabetes mellitus (CMS/HCC)    Relevant Orders    Hemoglobin A1C    Polycythemia - Primary    Relevant Orders    Serum Protein Electrophoresis + Immunofixation    [place]/Lambda Free Light Chain, Serum    Referral to Nephrology    Comprehensive Metabolic Panel    Hyperlipidemia    Relevant Orders    Lipid Panel    Essential hypertension    Relevant Orders    Comprehensive Metabolic Panel    Albumin , Urine Random    Chronic renal insufficiency    Relevant Orders    Serum Protein Electrophoresis + Immunofixation    [place]/Lambda Free Light Chain, Serum    Referral to Nephrology    Comprehensive Metabolic Panel    Albumin , Urine Random     Other Visit Diagnoses       SVT (supraventricular tachycardia)        Relevant Orders    Referral to Cardiology    TSH with reflex to Free T4 if abnormal               Saige Yates, a 72 y/o F with PMHX of HTN, HLD, CVA, T2DM, CKD stage 3, gout, polycythemia, left sided breast cancer s/p lumpectomy and radiation in 2015, hyperandrogenism, PHPT, and osteoporosis presented to the clinic for hospital discharge follow-up.  Patient was seen here at our clinic 3 days ago where she presented initially for concern of elevated blood pressure at that time she was found to be tachycardic in the 140s EKG was obtained and showed SVT.  Ivan was called and and the patien was taken to [facility].  At that point she received adenosine after  unsuccessful vagal maneuvers.  Cardiology was consulted at that time and recommended to follow-up outpatient.    Per chart/ED NOTE   73-year-old  female comes in with signs and symptoms consistent with SVT.  Blood pressure is stable.  Heart rate elevated in the 140s.  Persistent.  EKG appears regular.  Consistent with SVT.  We trialed vagal maneuvers without success.  Will give dose of adenosine.  No signs of PE.  Low suspicion for ACS.  Patient asymptomatic besides the elevated heart rate.  EKG consistent with SVT.  From a laboratory standpoint signs of dehydration with elevated creatinine level.  BUN of 32.  No leukocytosis.  Chest x-ray showed no acute findings.  We attempted vagal maneuvers without any resolution of her symptoms.   We then gave a dose of adenosine with resolution of her SVT.  Patient went into sinus rhythm.  Remained asymptomatic.  Heart rate was stable.  I spoke to cardiology Dr. hernandez, agrees there is no indication for admission at this time.  Can follow-up as an outpatient with cardiology/primary care.  Patient made aware the results.  Patient discharged in stable condition with strict return precautions.      Today at clinic patient feels well denies any acute distress concerns or new complaints she denies any fever chills sweats,chest pain shortness of breath visual disturbances syncope palpitation nausea vomiting diarrhea or altered mentation, melena hematochezia or urinary symptoms.  She did report that she has been drinking enough water but we did emphasize again on the importance of drinking water and staying hydrated.      CARDIOLOGY AND NEPHROLOGY REFERRAL TODAY     # SVT on previous visit  -HR 80s  - Sent to the ED, was given Adenosine with conversion to NSR and discharged; per ED note likely due to dehydration   -Currently on Metoprolol succinate 100 mg daily   [ ] Consider to add Holter Monitor if recurrent   [ ] Cardiology referral placed today      # HTN  - BP today 180/120; repeat 160/100  - Will need close observation in 6 weeks/ cardio referral placed for further regimen adjustment   - Current Regimen: Amlodipine 10 mg  every day + Lisinopril 40 mg every day + Metoprolol Succinate  mg every day      # CKD Stage G3b, A3   - Cr, GFR (3/15/2024): 1.53, 35  - Urine Albumin (9/2023): 394.6   [ ] F/U with Nephrology , referral placed today  - Ordered SPEP with immunofixation, K/L Light chains given elevated total protein and calcium     #Polycythemia  -Follows with Heme/Onc      # HLD  - c/w Atorvastatin 20 mg every day      # T2DM  - Last A1c (9/2023): 6.6  - Current Regimen: Trulicity 1.5 mg/week      #PHPT  #Hyperandrogenism   #DM  -Follows with Endocrinology Dr. Judd  -Will follow      Routine Screening:  - Mammogram (6/2023): stable left breast post treatment, no mammographic evidence of malignancy, repeat 6/2024  - Colonoscopy/Cologuard: due, requisite form provided   - DEXA Scan: due, requisite form provided  -ALL SCREENS ordered to be completed   -Immunizations: reviewed

## 2024-03-20 NOTE — PROGRESS NOTES
Chief Complaint: No chief complaint on file.     MURTAZA Yates is a 73 y.o. year old female who presents to the clinic for     Past Medical History   Past Medical History:   Diagnosis Date    Abnormal findings on diagnostic imaging of other specified body structures 12/08/2018    Abnormal chest CT    Abnormal levels of other serum enzymes 01/14/2017    Elevated liver enzymes    Body mass index (BMI) 21.0-21.9, adult 11/24/2021    BMI 21.0-21.9, adult    Body mass index (BMI) 22.0-22.9, adult 06/06/2022    Body mass index (BMI) of 22.0 to 22.9 in adult    Body mass index (BMI) 23.0-23.9, adult 07/07/2021    BMI 23.0-23.9, adult    Body mass index (BMI) 24.0-24.9, adult 09/06/2019    BMI 24.0-24.9, adult    Encounter for therapeutic drug level monitoring 03/12/2018    Encounter for monitoring anastrozole therapy    Essential (primary) hypertension 08/24/2017    Uncontrolled hypertension    Other cerebral infarction due to occlusion or stenosis of small artery (CMS/HCC) 04/08/2019    Lacunar infarction    Other conditions influencing health status 01/22/2018    Malignant neoplasm of left female breast, unspecified site of breast    Other specified disorders of breast 04/26/2016    Postoperative breast asymmetry    Personal history of diseases of the skin and subcutaneous tissue 11/23/2013    History of atopic dermatitis    Personal history of other specified conditions 11/07/2015    History of chest pain    Personal history of transient ischemic attack (TIA), and cerebral infarction without residual deficits 12/07/2019    History of transient cerebral ischemia    Unspecified lump in the left breast, unspecified quadrant 08/24/2017    Breast mass, left      Past Surgical History:   Past Surgical History:   Procedure Laterality Date    BREAST BIOPSY  02/26/2015    Biopsy Breast Percutaneous Needle Core    COLONOSCOPY  05/20/2017    Complete Colonoscopy    GALLBLADDER SURGERY  02/09/2015    Gallbladder Surgery     "MR HEAD ANGIO WO IV CONTRAST  11/12/2021    MR HEAD ANGIO WO IV CONTRAST 11/12/2021 [facility] CLINICAL LEGACY    OTHER SURGICAL HISTORY  04/23/2018    Parathyroid Resection    OTHER SURGICAL HISTORY  04/27/2015    Left Breast Partial Mastectomy    SENTINEL LYMPH NODE BIOPSY  04/27/2015    [place] Lymph Node Biopsy     Family History:   Family History   Problem Relation Name Age of Onset    Other (cardiovascular disorder) Father      Stroke Father      Arthritis Sister       Social History:   Tobacco Use: Low Risk  (1/3/2024)    Patient History     Smoking Tobacco Use: Never     Smokeless Tobacco Use: Never     Passive Exposure: Not on file      Social History     Substance and Sexual Activity   Alcohol Use Never        Allergies:   Allergies   Allergen Reactions    Other Unknown     HAIR DYE    Penicillins Unknown        ROS   Review of Systems     Objective   There were no vitals filed for this visit.   BMI Readings from Last 15 Encounters:   03/15/24 19.24 kg/m²   01/03/24 19.60 kg/m²   12/28/23 20.10 kg/m²   12/15/23 20.41 kg/m²   09/26/23 19.58 kg/m²   08/29/23 19.93 kg/m²   06/26/23 20.64 kg/m²   06/20/23 21.02 kg/m²   03/28/23 20.96 kg/m²   03/06/23 20.90 kg/m²   02/14/23 21.30 kg/m²   02/06/23 21.32 kg/m²   01/24/23 20.79 kg/m²   12/16/22 21.85 kg/m²   12/06/22 21.64 kg/m²      50.8 kg (112 lb) (3/15/2024  1:10 PM)      Physical Exam  Physical Exam     Labs:  Lab Results   Component Value Date    WBC 6.9 12/28/2023    HGB 14.4 12/28/2023    HCT 46.8 (H) 12/28/2023    MCV 87 12/28/2023     12/28/2023     Lab Results   Component Value Date    GLUCOSE 125 (H) 09/26/2023    CALCIUM 11.3 (H) 09/26/2023     09/26/2023    K 3.9 09/26/2023    CO2 28 09/26/2023     09/26/2023    BUN 22 09/26/2023    CREATININE 1.42 (H) 09/26/2023         No components found for: \"URINE ALBUMIN\"  Lab Results   Component Value Date    HGBA1C 6.8 (A) 12/28/2023      Lab Results   Component Value Date    HGBA1C 6.8 (A) " 12/28/2023    HGBA1C 6.6 (A) 09/26/2023    HGBA1C 7.3 (A) 06/20/2023     Lab Results   Component Value Date    TSH 0.95 09/26/2023       Current Medications:  Current Outpatient Medications   Medication Sig Dispense Refill    allopurinol (Zyloprim) 300 mg tablet Take 1 tablet (300 mg) by mouth once daily.      amLODIPine (Norvasc) 10 mg tablet Take 1 tablet (10 mg) by mouth once daily. 90 tablet 1    anastrozole (Arimidex) 1 mg tablet Take 1 tablet (1 mg total) by mouth once daily. 90 tablet 0    atorvastatin (Lipitor) 20 mg tablet TAKE 1 TABLET BY MOUTH ONCE  DAILY 90 tablet 3    b complex vitamins capsule Take 1 tablet by mouth once daily.      blood sugar diagnostic (Prodigy No Coding) strip TEST twice a day   dx E11.9      clopidogrel (Plavix) 75 mg tablet Take 1 tablet (75 mg) by mouth once daily.      dulaglutide (Trulicity) 0.75 mg/0.5 mL pen injector Inject 0.75 mg under the skin 1 (one) time per week. 6 mL 1    dulaglutide (Trulicity) 1.5 mg/0.5 mL pen injector injection Inject 1.5 mg, once a week 6 mL 1    folic acid (Folvite) 1 mg tablet Take 1 tablet (1 mg) by mouth once daily.      FreeStyle lancets 28 gauge 2 times a day. Use as instructed      lisinopril 40 mg tablet TAKE 1 TABLET BY MOUTH ONCE  DAILY 90 tablet 3    metoprolol succinate XL (Toprol-XL) 100 mg 24 hr tablet Take 1 tablet (100 mg) by mouth once daily. Do not crush or chew. 90 tablet 3     No current facility-administered medications for this visit.       Assessment and Plan  There are no diagnoses linked to this encounter.     # SVT on previous visit  - Sent to the ED, was given Adenosine with conversion to NSR and discharged  [ ] Holter Monitor  [ ] Cardiology referral     # HTN  - Current Regimen: Amlodipine 10 mg every day + Lisinopril 40 mg every day + Metoprolol Succinate  mg every day     # CKD Stage G3b, A3   - Cr, GFR (3/15/2024): 1.53, 35  - Urine Albumin (9/2023): 394.6   [ ] F/U with Nephrology     # HLD  - c/w  Atorvastatin 20 mg every day     # T2DM  - Last A1c (9/2023): 6.6  - Current Regimen: Trulicity 1.5 mg/week     Labs:  - CBC, CMP, Urine Albumin, TSH w/ T4, Lipid Panel, HbA1c, Vitamin D (9/2023), repeat 9/2024    Routine Screening:  - Mammogram (6/2023): stable left breast post treatment, no mammographic evidence of malignancy, repeat 6/2024  - Colonoscopy/Cologuard: due, requisite form provided   - DEXA Scan: due, requisite form provided    Immunizations:  Immunization History   Administered Date(s) Administered    Flu vaccine (IIV4), preservative free *Check age/dose* 11/07/2015    Flu vaccine, quadrivalent, high-dose, preservative free, age 65y+ (FLUZONE) 08/29/2023    Influenza, High Dose Seasonal, Preservative Free 10/08/2021    Influenza, Unspecified 10/01/2012    Influenza, seasonal, injectable, preservative free 10/08/2016    Pneumococcal conjugate vaccine, 13-valent (PREVNAR 13) 05/20/2017    Pneumococcal polysaccharide vaccine, 23-valent, age 2 years and older (PNEUMOVAX 23) 04/09/2016    Tdap vaccine, age 7 year and older (BOOSTRIX, ADACEL) 08/15/2013     Please follow up in

## 2024-03-30 ENCOUNTER — LAB (OUTPATIENT)
Dept: LAB | Facility: LAB | Age: 74
End: 2024-03-30
Payer: MEDICARE

## 2024-03-30 DIAGNOSIS — R80.9 TYPE 2 DIABETES MELLITUS WITH MICROALBUMINURIA, WITHOUT LONG-TERM CURRENT USE OF INSULIN (MULTI): ICD-10-CM

## 2024-03-30 DIAGNOSIS — R79.89 ELEVATED TESTOSTERONE LEVEL IN FEMALE: ICD-10-CM

## 2024-03-30 DIAGNOSIS — E21.3 HYPERPARATHYROIDISM (MULTI): ICD-10-CM

## 2024-03-30 DIAGNOSIS — E78.5 HYPERLIPIDEMIA, UNSPECIFIED HYPERLIPIDEMIA TYPE: ICD-10-CM

## 2024-03-30 DIAGNOSIS — E55.9 VITAMIN D DEFICIENCY: ICD-10-CM

## 2024-03-30 DIAGNOSIS — D75.1 POLYCYTHEMIA: ICD-10-CM

## 2024-03-30 DIAGNOSIS — I47.10 SVT (SUPRAVENTRICULAR TACHYCARDIA) (CMS-HCC): ICD-10-CM

## 2024-03-30 DIAGNOSIS — E11.29 TYPE 2 DIABETES MELLITUS WITH MICROALBUMINURIA, WITHOUT LONG-TERM CURRENT USE OF INSULIN (MULTI): ICD-10-CM

## 2024-03-30 DIAGNOSIS — I10 ESSENTIAL HYPERTENSION: ICD-10-CM

## 2024-03-30 DIAGNOSIS — N18.30 CHRONIC RENAL IMPAIRMENT, STAGE 3 (MODERATE), UNSPECIFIED WHETHER STAGE 3A OR 3B CKD (MULTI): ICD-10-CM

## 2024-03-30 LAB
25(OH)D3 SERPL-MCNC: 42 NG/ML (ref 30–100)
ALBUMIN SERPL BCP-MCNC: 3.9 G/DL (ref 3.4–5)
ALP SERPL-CCNC: 109 U/L (ref 33–136)
ALT SERPL W P-5'-P-CCNC: 22 U/L (ref 7–45)
ANION GAP SERPL CALC-SCNC: 14 MMOL/L (ref 10–20)
AST SERPL W P-5'-P-CCNC: 17 U/L (ref 9–39)
BILIRUB SERPL-MCNC: 1.2 MG/DL (ref 0–1.2)
BUN SERPL-MCNC: 32 MG/DL (ref 6–23)
CALCIUM SERPL-MCNC: 10.8 MG/DL (ref 8.6–10.6)
CHLORIDE SERPL-SCNC: 101 MMOL/L (ref 98–107)
CHOLEST SERPL-MCNC: 156 MG/DL (ref 0–199)
CHOLESTEROL/HDL RATIO: 2.2
CO2 SERPL-SCNC: 31 MMOL/L (ref 21–32)
CREAT SERPL-MCNC: 1.49 MG/DL (ref 0.5–1.05)
CREAT UR-MCNC: 61.3 MG/DL (ref 20–320)
DHEA-S SERPL-MCNC: 107 UG/DL (ref 13–130)
EGFRCR SERPLBLD CKD-EPI 2021: 36 ML/MIN/1.73M*2
EST. AVERAGE GLUCOSE BLD GHB EST-MCNC: 186 MG/DL
GLUCOSE SERPL-MCNC: 204 MG/DL (ref 74–99)
HBA1C MFR BLD: 8.1 %
HDLC SERPL-MCNC: 69.8 MG/DL
LDLC SERPL CALC-MCNC: 71 MG/DL
MICROALBUMIN UR-MCNC: 1999.3 MG/L
MICROALBUMIN/CREAT UR: 3261.5 UG/MG CREAT
NON HDL CHOLESTEROL: 86 MG/DL (ref 0–149)
PHOSPHATE SERPL-MCNC: 3.4 MG/DL (ref 2.5–4.9)
POTASSIUM SERPL-SCNC: 4 MMOL/L (ref 3.5–5.3)
PROT SERPL-MCNC: 7.8 G/DL (ref 6.4–8.2)
PROT SERPL-MCNC: 7.8 G/DL (ref 6.4–8.2)
SODIUM SERPL-SCNC: 142 MMOL/L (ref 136–145)
TRIGL SERPL-MCNC: 75 MG/DL (ref 0–149)
TSH SERPL-ACNC: 1.46 MIU/L (ref 0.44–3.98)
VLDL: 15 MG/DL (ref 0–40)

## 2024-03-30 PROCEDURE — 80053 COMPREHEN METABOLIC PANEL: CPT

## 2024-03-30 PROCEDURE — 84155 ASSAY OF PROTEIN SERUM: CPT

## 2024-03-30 PROCEDURE — 84402 ASSAY OF FREE TESTOSTERONE: CPT

## 2024-03-30 PROCEDURE — 86320 SERUM IMMUNOELECTROPHORESIS: CPT | Performed by: INTERNAL MEDICINE

## 2024-03-30 PROCEDURE — 86334 IMMUNOFIX E-PHORESIS SERUM: CPT

## 2024-03-30 PROCEDURE — 84165 PROTEIN E-PHORESIS SERUM: CPT

## 2024-03-30 PROCEDURE — 83970 ASSAY OF PARATHORMONE: CPT

## 2024-03-30 PROCEDURE — 83521 IG LIGHT CHAINS FREE EACH: CPT

## 2024-03-30 PROCEDURE — 36415 COLL VENOUS BLD VENIPUNCTURE: CPT

## 2024-03-30 PROCEDURE — 84165 PROTEIN E-PHORESIS SERUM: CPT | Performed by: INTERNAL MEDICINE

## 2024-03-30 PROCEDURE — 84443 ASSAY THYROID STIM HORMONE: CPT

## 2024-03-30 PROCEDURE — 84100 ASSAY OF PHOSPHORUS: CPT

## 2024-03-30 PROCEDURE — 83036 HEMOGLOBIN GLYCOSYLATED A1C: CPT

## 2024-03-30 PROCEDURE — 82570 ASSAY OF URINE CREATININE: CPT

## 2024-03-30 PROCEDURE — 82627 DEHYDROEPIANDROSTERONE: CPT

## 2024-03-30 PROCEDURE — 82306 VITAMIN D 25 HYDROXY: CPT

## 2024-03-30 PROCEDURE — 80061 LIPID PANEL: CPT

## 2024-03-30 PROCEDURE — 82043 UR ALBUMIN QUANTITATIVE: CPT

## 2024-03-31 LAB
KAPPA LC SERPL-MCNC: 7.94 MG/DL (ref 0.33–1.94)
KAPPA LC/LAMBDA SER: 1.88 {RATIO} (ref 0.26–1.65)
LAMBDA LC SERPL-MCNC: 4.22 MG/DL (ref 0.57–2.63)
PTH-INTACT SERPL-MCNC: 110.8 PG/ML (ref 18.5–88)

## 2024-04-01 NOTE — RESULT ENCOUNTER NOTE
Needs to see a nephrologist and hematologist.  She is already scheduled with hematology but not nephrology.  Please help with scheduling.

## 2024-04-03 LAB
ALBUMIN: 3.8 G/DL (ref 3.4–5)
ALPHA 1 GLOBULIN: 0.3 G/DL (ref 0.2–0.6)
ALPHA 2 GLOBULIN: 0.9 G/DL (ref 0.4–1.1)
BETA GLOBULIN: 1.1 G/DL (ref 0.5–1.2)
GAMMA GLOBULIN: 1.7 G/DL (ref 0.5–1.4)
IMMUNOFIXATION COMMENT: ABNORMAL
PATH REVIEW - SERUM IMMUNOFIXATION: ABNORMAL
PATH REVIEW-SERUM PROTEIN ELECTROPHORESIS: ABNORMAL
PROTEIN ELECTROPHORESIS COMMENT: ABNORMAL

## 2024-04-04 LAB
TESTOSTERONE FREE (CHAN): 39.5 PG/ML (ref 0.2–3.7)
TESTOSTERONE,TOTAL,LC-MS/MS: 282 NG/DL (ref 2–45)

## 2024-04-05 ENCOUNTER — TELEPHONE (OUTPATIENT)
Dept: ENDOCRINOLOGY | Facility: CLINIC | Age: 74
End: 2024-04-05
Payer: MEDICARE

## 2024-04-05 NOTE — TELEPHONE ENCOUNTER
Left a VM on the refill/general line. She wants you to call her. She has questions regarding her bone density test and states she is confused and has a few questions for you before the test.

## 2024-04-05 NOTE — TELEPHONE ENCOUNTER
Spoke to patient.  She was confused about when and where her bone density test was going to be done.  It appears that she is scheduled at  [facility] for 5/28 at 9:30 am.  I let her know of this.  She has many upcoming appointments. I advised her to have a list of her appointments printed out at her next PCP appointment on 4/17.  She verbalized understanding and was appreciative of the call.

## 2024-04-17 ENCOUNTER — APPOINTMENT (OUTPATIENT)
Dept: PRIMARY CARE | Facility: CLINIC | Age: 74
End: 2024-04-17
Payer: MEDICARE

## 2024-04-24 ENCOUNTER — APPOINTMENT (OUTPATIENT)
Dept: PRIMARY CARE | Facility: CLINIC | Age: 74
End: 2024-04-24
Payer: MEDICARE

## 2024-05-01 ENCOUNTER — HOSPITAL ENCOUNTER (OUTPATIENT)
Dept: GASTROENTEROLOGY | Facility: HOSPITAL | Age: 74
Setting detail: OUTPATIENT SURGERY
Discharge: HOME | End: 2024-05-01
Payer: MEDICARE

## 2024-05-01 VITALS
DIASTOLIC BLOOD PRESSURE: 118 MMHG | RESPIRATION RATE: 14 BRPM | HEART RATE: 106 BPM | OXYGEN SATURATION: 97 % | SYSTOLIC BLOOD PRESSURE: 192 MMHG | WEIGHT: 114.42 LBS | BODY MASS INDEX: 19.06 KG/M2 | TEMPERATURE: 96.8 F | HEIGHT: 65 IN

## 2024-05-01 DIAGNOSIS — Z12.11 COLON CANCER SCREENING: Primary | ICD-10-CM

## 2024-05-01 DIAGNOSIS — Z12.11 SCREENING FOR COLON CANCER: Primary | ICD-10-CM

## 2024-05-01 LAB — GLUCOSE BLD MANUAL STRIP-MCNC: 182 MG/DL (ref 74–99)

## 2024-05-01 PROCEDURE — 82947 ASSAY GLUCOSE BLOOD QUANT: CPT

## 2024-05-01 RX ORDER — POLYETHYLENE GLYCOL 3350, SODIUM SULFATE ANHYDROUS, SODIUM BICARBONATE, SODIUM CHLORIDE, POTASSIUM CHLORIDE 236; 22.74; 6.74; 5.86; 2.97 G/4L; G/4L; G/4L; G/4L; G/4L
4000 POWDER, FOR SOLUTION ORAL ONCE
Qty: 4000 ML | Refills: 0 | Status: SHIPPED | OUTPATIENT
Start: 2024-05-01 | End: 2024-05-01

## 2024-05-01 RX ORDER — SODIUM CHLORIDE, SODIUM LACTATE, POTASSIUM CHLORIDE, CALCIUM CHLORIDE 600; 310; 30; 20 MG/100ML; MG/100ML; MG/100ML; MG/100ML
20 INJECTION, SOLUTION INTRAVENOUS CONTINUOUS
Status: CANCELLED | OUTPATIENT
Start: 2024-05-01

## 2024-05-01 ASSESSMENT — COLUMBIA-SUICIDE SEVERITY RATING SCALE - C-SSRS
6. HAVE YOU EVER DONE ANYTHING, STARTED TO DO ANYTHING, OR PREPARED TO DO ANYTHING TO END YOUR LIFE?: NO
2. HAVE YOU ACTUALLY HAD ANY THOUGHTS OF KILLING YOURSELF?: NO
1. IN THE PAST MONTH, HAVE YOU WISHED YOU WERE DEAD OR WISHED YOU COULD GO TO SLEEP AND NOT WAKE UP?: NO

## 2024-05-01 ASSESSMENT — PAIN SCALES - GENERAL: PAINLEVEL_OUTOF10: 0 - NO PAIN

## 2024-05-01 ASSESSMENT — PAIN - FUNCTIONAL ASSESSMENT: PAIN_FUNCTIONAL_ASSESSMENT: 0-10

## 2024-05-01 NOTE — DISCHARGE INSTRUCTIONS
Patient Instructions after a Colonoscopy      The anesthetics, sedatives or narcotics which were given to you today will be acting in your body for the next 24 hours, so you might feel a little sleepy or groggy.  This feeling should slowly wear off. Carefully read and follow the instructions.     You received sedation today:  - Do not drive or operate any machinery or power tools of any kind.   - No alcoholic beverages today, not even beer or wine.  - Do not make any important decisions or sign any legal documents.  - No over the counter medications that contain alcohol or that may cause drowsiness.  - Do not make any important decisions or sign any legal documents.    While it is common to experience mild to moderate abdominal distention, gas, or belching after your procedure, if any of these symptoms occur following discharge from the GI Lab or within one week of having your procedure, call the [facility] to be advised whether a visit to your nearest Urgent Care or Emergency Department is indicated.  Take this paper with you if you go.     - If you develop an allergic reaction to the medications that were given during your procedure such as difficulty breathing, rash, hives, severe nausea, vomiting or lightheadedness.  - If you experience chest pain, shortness of breath, severe abdominal pain, fevers and chills.  -If you develop signs and symptoms of bleeding such as blood in your spit, if your stools turn black, tarry, or bloody  - If you have not urinated within 8 hours following your procedure.  - If your IV site becomes painful, red, inflamed, or looks infected.    If you received a biopsy/polypectomy/sphincterotomy the following instructions apply below:    __ Do not use Aspirin containing products, non-steroidal medications or anti-coagulants for one week following your procedure. (Examples of these types of medications are: Advil, Arthrotec, Aleve, Coumadin, Ecotrin, Heparin, Ibuprofen,  Indocin, Motrin, Naprosyn, Nuprin, Plavix, Vioxx, and Voltarin, or their generic forms.  This list is not all-inclusive.  Check with your physician or pharmacist before resuming medications.)   __ Eat a soft diet today.  Avoid foods that are poorly digested for the next 24 hours.  These foods would include: nuts, beans, lettuce, red meats, and fried foods. Start with liquids and advance your diet as tolerated, gradually work up to eating solids.   __ Do not have a Barium Study or Enema for one week.    Your physician recommends the additional following instructions:    -You have a contact number available for emergencies. The signs and symptoms of potential delayed complications were discussed with you. You may return to normal activities tomorrow.  -Resume your previous diet.  -Continue your present medications.   -We are waiting for your pathology results.  -Your physician has recommended a repeat colonoscopy (date to be determined after pending pathology results are reviewed) for surveillance based on pathology results.  -The findings and recommendations have been discussed with you.  -The findings and recommendations were discussed with your family.  - Please see Medication Reconciliation Form for new medication/medications prescribed.       If you experience any problems or have any questions following discharge from the GI Lab, please call:    Butch Bustillo MD  [phone]      Nurse Signature                                                                        Date___________________                                                                            Patient/Responsible Party Signature                                        Date___________________

## 2024-05-02 ENCOUNTER — TELEPHONE (OUTPATIENT)
Dept: ENDOCRINOLOGY | Facility: CLINIC | Age: 74
End: 2024-05-02
Payer: MEDICARE

## 2024-05-02 ENCOUNTER — APPOINTMENT (OUTPATIENT)
Dept: CARDIOLOGY | Facility: HOSPITAL | Age: 74
End: 2024-05-02
Payer: MEDICARE

## 2024-05-02 NOTE — TELEPHONE ENCOUNTER
Left a VM on 4/30 on another provider's line that was forwarded to me today. She is asking about when her Bone Density test is again and if Dr. Dunne will be there.    I returned call and left a VM with appointment info for her upcoming appt with dr. Dunne and for her DEXA. Provided my number if she has any questions.

## 2024-05-03 DIAGNOSIS — I67.2 INTRACRANIAL ATHEROSCLEROSIS: ICD-10-CM

## 2024-05-03 RX ORDER — METOPROLOL SUCCINATE 100 MG/1
100 TABLET, EXTENDED RELEASE ORAL DAILY
Qty: 90 TABLET | Refills: 0 | Status: SHIPPED | OUTPATIENT
Start: 2024-05-03

## 2024-05-10 NOTE — PROGRESS NOTES
FUV for type 2 DM, PHPT, and osteoporosis. LV with me 2024.    History of Present Illness  73 y.o. female with hx of T2DM, stroke, CKD stage 3, hypertension, polycythemia, left sided breast cancer s/p lumpectomy and radiation in , hyperandrogenism, PHPT, and osteoporosis, here for follow up.    <Hyperandrogenism>  Hx of elevated testosterone levels since 2018 (> 200 ng/dL).  Has some facial hirsutism, mustache mainly, hirsutism has been noticed for years it has not been worsening  Trasnvaginal US (2018): no lesions on right ovary, left ovary was unable to be seen  CT Abd/Plevis (2018): no adrenal lesions  Was recommended TAHBSO by Dr. Ang Cruz as well as myself, but has been declining surgery.  Has polycemia requiring periodic phlebotomy (sees Dr. Tobias-->has appointment with new hematologist in )     <PHPT>  Hx of hyperparathyroidism and had parathyroid surgery in 2018 (Dr. Pradhan).  L superior parathyroid resected-hypercellular on pathology.  Calcium levels continue to be elevated with non-suppressed PTH.  DXA () : Osteoporosis (distal radius T-score: -2.8)  DXA (2022): Osteoporosis (T-score 1/3 distal radius: -2.8, L neck -1.8, Spine +0.2)  Significant decrease in spine BMD per LSC (*L2-L4 compared in 2019 vs L1-L3 in )  On anastrazole since  for breast cancer.  Not on any antiosteoporosis medication.    <Type 2 DM>  Dx: approx 2018  HbA1c:  8.1% (2024), 6.8% (2023), 6.6% (2023),7.3% (2023), 6.6% (2023), 6.4% (23). 7.1% (2022), 6.1% (2022), 9.8% (2021), 8.5% (10/2021)  Current regimen: Trulicity 1.5 mg/week (0.75 mg during shortage)  Past medications: glipizide and tradjenta  Complications: nephropathy  Comorbidities: breast cancer, HTN, TIA, CKD stage 3    SMBG: twice a day  Fastins-170s  Dinner: 150s-180s    ROS  General: no fever or chills  CV: no chest pain   Respiratory: no shortness of breath  MSK: no lower extremity edema  Neuro:  no headache or dizziness  See HPI for Endocrine ROS    Past Medical History:   Diagnosis Date    Abnormal findings on diagnostic imaging of other specified body structures 12/08/2018    Abnormal chest CT    Abnormal levels of other serum enzymes 01/14/2017    Elevated liver enzymes    Body mass index (BMI) 21.0-21.9, adult 11/24/2021    BMI 21.0-21.9, adult    Body mass index (BMI) 22.0-22.9, adult 06/06/2022    Body mass index (BMI) of 22.0 to 22.9 in adult    Body mass index (BMI) 23.0-23.9, adult 07/07/2021    BMI 23.0-23.9, adult    Body mass index (BMI) 24.0-24.9, adult 09/06/2019    BMI 24.0-24.9, adult    Encounter for therapeutic drug level monitoring 03/12/2018    Encounter for monitoring anastrozole therapy    Essential (primary) hypertension 08/24/2017    Uncontrolled hypertension    Other cerebral infarction due to occlusion or stenosis of small artery (Multi) 04/08/2019    Lacunar infarction    Other conditions influencing health status 01/22/2018    Malignant neoplasm of left female breast, unspecified site of breast    Other specified disorders of breast 04/26/2016    Postoperative breast asymmetry    Personal history of diseases of the skin and subcutaneous tissue 11/23/2013    History of atopic dermatitis    Personal history of other specified conditions 11/07/2015    History of chest pain    Personal history of transient ischemic attack (TIA), and cerebral infarction without residual deficits 12/07/2019    History of transient cerebral ischemia    Unspecified lump in the left breast, unspecified quadrant 08/24/2017    Breast mass, left       Past Surgical History:   Procedure Laterality Date    BREAST BIOPSY  02/26/2015    Biopsy Breast Percutaneous Needle Core    COLONOSCOPY  05/20/2017    Complete Colonoscopy    GALLBLADDER SURGERY  02/09/2015    Gallbladder Surgery    MR HEAD ANGIO WO IV CONTRAST  11/12/2021    MR HEAD ANGIO WO IV CONTRAST 11/12/2021 [facility] CLINICAL LEGAC[facility]    OTHER SURGICAL  "HISTORY  04/23/2018    Parathyroid Resection    OTHER SURGICAL HISTORY  04/27/2015    Left Breast Partial Mastectomy    SENTINEL LYMPH NODE BIOPSY  04/27/2015    [place] Lymph Node Biopsy       Social History     Socioeconomic History    Marital status:      Spouse name: Not on file    Number of children: Not on file    Years of education: Not on file    Highest education level: Not on file   Occupational History    Not on file   Tobacco Use    Smoking status: Never    Smokeless tobacco: Never   Substance and Sexual Activity    Alcohol use: Never    Drug use: Never    Sexual activity: Not on file   Other Topics Concern    Not on file   Social History Narrative    Not on file     Social Determinants of Health     Financial Resource Strain: Not on file   Food Insecurity: Unknown (3/15/2024)    Received from [facility] Vital Sign     Worried About Running Out of Food in the Last Year: Never true     Ran Out of Food in the Last Year: Not on file   Transportation Needs: Not on file   Physical Activity: Not on file   Stress: Not on file   Social Connections: Not on file   Intimate Partner Violence: Not on file   Housing Stability: Not on file       Physical Exam   height is 1.651 m (5' 5\").   General: not in acute distress  HEENT: ZANDER GARZON  Thyroid: no goiter  Neuro: alert and oriented x 3    Current Outpatient Medications   Medication Sig Dispense Refill    allopurinol (Zyloprim) 300 mg tablet Take 1 tablet (300 mg) by mouth once daily.      amLODIPine (Norvasc) 10 mg tablet Take 1 tablet (10 mg) by mouth once daily. 90 tablet 1    anastrozole (Arimidex) 1 mg tablet Take 1 tablet (1 mg total) by mouth once daily. 90 tablet 0    atorvastatin (Lipitor) 20 mg tablet TAKE 1 TABLET BY MOUTH ONCE  DAILY 90 tablet 3    b complex vitamins capsule Take 1 tablet by mouth once daily.      blood sugar diagnostic (Prodigy No Coding) strip TEST twice a day   dx E11.9      clopidogrel (Plavix) 75 mg tablet " Take 1 tablet (75 mg) by mouth once daily.      dulaglutide (Trulicity) 0.75 mg/0.5 mL pen injector Inject 0.75 mg under the skin 1 (one) time per week. 6 mL 1    dulaglutide (Trulicity) 1.5 mg/0.5 mL pen injector injection Inject 1.5 mg, once a week 6 mL 1    folic acid (Folvite) 1 mg tablet Take 1 tablet (1 mg) by mouth once daily.      FreeStyle lancets 28 gauge 2 times a day. Use as instructed      lisinopril 40 mg tablet TAKE 1 TABLET BY MOUTH ONCE  DAILY 90 tablet 3    metoprolol succinate XL (Toprol-XL) 100 mg 24 hr tablet Take 1 tablet (100 mg) by mouth once daily. Do not crush or chew. 90 tablet 0     No current facility-administered medications for this visit.       Assessment and Plan  73 y.o. female with type 2 diabetes, CKD stage 3, stroke, breast CA s/p lumpectomy and RT (2015), anastrazole, PHPT s/p parathyroidectomy with persistent hypercalcemia, osteoporosis, and hyperandrogenism, here for follow-up.     1. Type 2 diabetes melltius  HbA1c:  8.1% (03/2024), 6.8% (12/28/2023), 6.6% (09/2023),7.3% (06/2023), 6.6% (2/14/2023), 6.4% (1/24/23), 7.1% (9/6/2022), 6.1% (4/16/2022, 9.8% (11/14/2021), 8.5% (10/2021), 9.4% (02/2021)  Current regimen: Trulicity 1.5 mg/week  Eye exam: LV 10/2023   Urine microalbumin: 3261 ug/mg (03/2024); on lisinopril  Lipids: HDL 70, LDL 71, TG 75 (03/2024) atorvastatin 20 mg    A1c 8.1% in March.  Has been on Trulicity 0.75 mg dose due to shortage of 1.5 mg dose.  Given significant albuminuria, CKD, and worsening A1c, will add SGLT2i. No hx of frequent vaginal infections or UTIs.  Given her current low body weight, will keep Trulicity at 0.75 mg dose for now.    Referred to nephrology by PCP. Has appointment in August.    BP elevated today. No headaches, changes in vision, chest pain, SOB.  She took lisinopril this morning. Thinks she is taking the amlodipine but is not sure as her medications are delivered through [facility] and her son gives them to her. Dispense history suggests  that the last dispense was in 09/2023 for a 3 month supply. Asked her to check her prescriptions today at home. If she has not been receiving amlodipine, she needs to notify me or her PCP.    PLAN:  -start Farxiga 10 mg QDAY  -continue Trulicity 1.5 mg/week (0.75 mg dose as back up)  -check blood sugars twice a day  -bring glucometer to every visit  -advised patient to let me know if BGs < 80  -scheduled with nephrology for 8/7/2024    2. Elevated testosterone  3. Polycythemia  Hx of elevated testosterone levels since 2018 (> 200 ng/dL).  DHEAS 217 ug/dL ( ug/dL).  Has facial hirsutism, mustache mainly for years.   Patient does not have worsening of hirsutism.  Voice has been deeper/a little bit hoarse.  No increase in sexual desire.  Transvaginal US (2018): no lesions on right ovary, left ovary was unable to be seen  CT Abd/Plevis (2018): no adrenal lesions  Has polycythemia requiring periodic phlebotomy (previously seeing Dr. Tobias, has not been seen by new hematologist yet)    No adrenal lesion on CT on initial evaluation. The elevated testosterone is likely from an ovarian source: ovarian hyperthecosis vs ovarian tumor.   Although she is on anastrazole for hx of breast cancer, the elevated testosterone provides more substrate for estrogen production. Estradiol in 05/2022 was 33 pg/mL which is higher than expected for a post-menopausal women on anastrazole.   Although hyperandrogen symptoms have not been worsening, would still recommend BSO for this patient given polycythemia and hx of stroke.  Surgery has been discussed multiple times and the rationale above explained, but she continues to declines.    Labs from 1/24/2023  Total testosterone: 259  Estradiol: 35    Labs from 06/2023  Total testosterone: 243  Hematocrit: 45.6    Labs from 12/28/2023  Hematocrit 46.8    Labs from 03/2024  Total testosterone 282  DHEAS 107  Hematocrit 50.2    Stable testosterone.   Advised patient to keep appointment with  hematology.    PLAN:  -check total testosterone, DHEAS before next visit  -continue phlebotomy per Hematology (visit scheduled for 6/12)    4. Primary Hyperparathyroidism  5. Hx of parathyroidectomy  6. Osteoporosis  S/p parathyroidectomy in 4/2018 (Dr. Pradhan-left superior parathyroid; hypercellular on pathology)  Remains hypercalcemic with non-suppressed PTH.    DXA (2019): [facility] Minoff  T-score 1/3 distal radius: -2.8; L neck -1.8, Spine +0.4  DXA (04/2022): [facility] Minoff  T-score 1/3 distal radius: -2.8 (-3.8%), L fem neck -1.8 (-0.1), Spine +0.2 (-5.2%*)    On anastrazole since 2015 for breast cancer.    Current regimen: none, did not tolerate risedronate (had hallucinations).   D3 supplementation: 400 IU  Calcium intake: no supplements, eats yogurt 2-3 times per week    Labs from 5/28/2022  Ca: 11.0  GFR: 44  24H urine calcium: 143 mg/day  Ca/creatinine ratio: 0.022    Labs from 06/2023  Ca 11.0  PTH 75  D25OH  Cre 1.59   eGFR 34  D25OH 43    Labs from 03/2024  Ca 10.8    D25OH 42  eGFR 36    Discussed repeat parathyroidectomy but patient declines. Calcium is stable.    Patient did not tolerate risedronate. Reports having hallucinations.  Currently, kidney function is not adequate for bisphosphonates.  Denosumab is an option, however, if she needs to stop denosumab for any reason, transitioning her to an antiresorptive may be difficult (cannot use PHT analogs due to PHPT, would avoid romosozumab due to polycythemia and hx of stroke).    Discussed at length with patient regarding treatment of osteoporosis.  Discussed denosumab and the rare side effects of ONJ and atypical femur fracture.  She is not opposed to starting denosumab. She asked appropriate questions regarding potential interactions with her other medications and affects on her other chronic conditions.    Saw dentist in August 2023. Planned for a root canal procedure but did not need to have this done. No planned procedures at this time.    Let her  know that PA for prolia may take several weeks but I will let her know once we are notified.    PLAN:  -start Prolia 60 mg Q6 months  -start calcium 500 mg (elemental) BID  -continue vitamin D supplementation  -encouraged to keep hydrated  -DXA scheduled for 5/28  -Dentist: Dr. Ivette France (phone: #[phone], fax:#[phone])  -check RFP, D25OH, PTH before next visit    7.  Left hand pain/edema  Has had right hand pain and edema for the past few days per patient.  No obvious trauma.  On exam, area just distal to the left wrist is edematous, tender, and warm to touch.  Possible cellulitis. I advised that she call her PCP immediately or go to urgent care/ED as she may require antibiotic treatment.       Will call regarding DXA results.  Follow-up in 4 months (labs prior)    OK to mail results if she cannot be reached.

## 2024-05-15 ENCOUNTER — OFFICE VISIT (OUTPATIENT)
Dept: ENDOCRINOLOGY | Facility: CLINIC | Age: 74
End: 2024-05-15
Payer: MEDICARE

## 2024-05-15 VITALS
BODY MASS INDEX: 18.49 KG/M2 | DIASTOLIC BLOOD PRESSURE: 112 MMHG | HEART RATE: 81 BPM | HEIGHT: 65 IN | SYSTOLIC BLOOD PRESSURE: 181 MMHG | WEIGHT: 111 LBS

## 2024-05-15 DIAGNOSIS — R80.9 TYPE 2 DIABETES MELLITUS WITH MICROALBUMINURIA, WITHOUT LONG-TERM CURRENT USE OF INSULIN (MULTI): ICD-10-CM

## 2024-05-15 DIAGNOSIS — R79.89 ELEVATED TESTOSTERONE LEVEL IN FEMALE: ICD-10-CM

## 2024-05-15 DIAGNOSIS — E21.3 HYPERPARATHYROIDISM (MULTI): Primary | ICD-10-CM

## 2024-05-15 DIAGNOSIS — M81.0 AGE-RELATED OSTEOPOROSIS WITHOUT CURRENT PATHOLOGICAL FRACTURE: ICD-10-CM

## 2024-05-15 DIAGNOSIS — E55.9 VITAMIN D DEFICIENCY: ICD-10-CM

## 2024-05-15 DIAGNOSIS — E11.29 TYPE 2 DIABETES MELLITUS WITH MICROALBUMINURIA, WITHOUT LONG-TERM CURRENT USE OF INSULIN (MULTI): ICD-10-CM

## 2024-05-15 PROCEDURE — 99215 OFFICE O/P EST HI 40 MIN: CPT | Performed by: STUDENT IN AN ORGANIZED HEALTH CARE EDUCATION/TRAINING PROGRAM

## 2024-05-15 PROCEDURE — 3048F LDL-C <100 MG/DL: CPT | Performed by: STUDENT IN AN ORGANIZED HEALTH CARE EDUCATION/TRAINING PROGRAM

## 2024-05-15 PROCEDURE — 3080F DIAST BP >= 90 MM HG: CPT | Performed by: STUDENT IN AN ORGANIZED HEALTH CARE EDUCATION/TRAINING PROGRAM

## 2024-05-15 PROCEDURE — 3077F SYST BP >= 140 MM HG: CPT | Performed by: STUDENT IN AN ORGANIZED HEALTH CARE EDUCATION/TRAINING PROGRAM

## 2024-05-15 PROCEDURE — 1036F TOBACCO NON-USER: CPT | Performed by: STUDENT IN AN ORGANIZED HEALTH CARE EDUCATION/TRAINING PROGRAM

## 2024-05-15 PROCEDURE — 4010F ACE/ARB THERAPY RXD/TAKEN: CPT | Performed by: STUDENT IN AN ORGANIZED HEALTH CARE EDUCATION/TRAINING PROGRAM

## 2024-05-15 PROCEDURE — 3052F HG A1C>EQUAL 8.0%<EQUAL 9.0%: CPT | Performed by: STUDENT IN AN ORGANIZED HEALTH CARE EDUCATION/TRAINING PROGRAM

## 2024-05-15 PROCEDURE — 3062F POS MACROALBUMINURIA REV: CPT | Performed by: STUDENT IN AN ORGANIZED HEALTH CARE EDUCATION/TRAINING PROGRAM

## 2024-05-15 PROCEDURE — 1159F MED LIST DOCD IN RCRD: CPT | Performed by: STUDENT IN AN ORGANIZED HEALTH CARE EDUCATION/TRAINING PROGRAM

## 2024-05-15 RX ORDER — DAPAGLIFLOZIN 10 MG/1
10 TABLET, FILM COATED ORAL DAILY
Qty: 90 TABLET | Refills: 3 | Status: SHIPPED | OUTPATIENT
Start: 2024-05-15 | End: 2024-05-31

## 2024-05-15 RX ORDER — CALCIUM CARBONATE 500(1250)
1 TABLET,CHEWABLE ORAL 2 TIMES DAILY
Qty: 180 TABLET | Refills: 3 | Status: SHIPPED | OUTPATIENT
Start: 2024-05-15

## 2024-05-15 NOTE — PATIENT INSTRUCTIONS
"Schedule appointment with nephrology    2.   Check to see if \"AMLODIPINE\" is being delivered to you from [facility]    3.   For Diabetes: Continue Trulcity once a week. Start Farxiga 10 mg, 1 tab, once a day.     4.   For your bones: start calcium, 1 tab, twice a day  on 5/29 (the day after your bone density test)    5.  Please have blood work done a few days before your next appointment with me          "

## 2024-05-16 ENCOUNTER — SPECIALTY PHARMACY (OUTPATIENT)
Dept: PHARMACY | Facility: CLINIC | Age: 74
End: 2024-05-16

## 2024-05-22 ENCOUNTER — TELEPHONE (OUTPATIENT)
Dept: ENDOCRINOLOGY | Facility: CLINIC | Age: 74
End: 2024-05-22
Payer: MEDICARE

## 2024-05-22 DIAGNOSIS — E11.29 TYPE 2 DIABETES MELLITUS WITH MICROALBUMINURIA, WITHOUT LONG-TERM CURRENT USE OF INSULIN (MULTI): Primary | ICD-10-CM

## 2024-05-22 DIAGNOSIS — R80.9 TYPE 2 DIABETES MELLITUS WITH MICROALBUMINURIA, WITHOUT LONG-TERM CURRENT USE OF INSULIN (MULTI): Primary | ICD-10-CM

## 2024-05-22 NOTE — TELEPHONE ENCOUNTER
Pharmacy requested PA or alternative for Farxiga. Preferred is Invokana or Jardiance    Prior Auth for farxiga pending determination  Submitted on 5/22 via [facility]    KEY: [id number]

## 2024-05-24 DIAGNOSIS — E11.29 TYPE 2 DIABETES MELLITUS WITH MICROALBUMINURIA, WITHOUT LONG-TERM CURRENT USE OF INSULIN (MULTI): ICD-10-CM

## 2024-05-24 DIAGNOSIS — R80.9 TYPE 2 DIABETES MELLITUS WITH MICROALBUMINURIA, WITHOUT LONG-TERM CURRENT USE OF INSULIN (MULTI): ICD-10-CM

## 2024-05-24 RX ORDER — DULAGLUTIDE 0.75 MG/.5ML
0.75 INJECTION, SOLUTION SUBCUTANEOUS
Qty: 6 ML | Refills: 1 | Status: SHIPPED | OUTPATIENT
Start: 2024-05-26

## 2024-05-24 NOTE — TELEPHONE ENCOUNTER
Saige left a VM on the refill line asking for renewal of her trulicity .75mg     Requested Prescriptions     Pending Prescriptions Disp Refills    dulaglutide (Trulicity) 0.75 mg/0.5 mL pen injector 6 mL 1     Sig: Inject 0.75 mg under the skin 1 (one) time per week.

## 2024-05-28 ENCOUNTER — HOSPITAL ENCOUNTER (OUTPATIENT)
Dept: RADIOLOGY | Facility: CLINIC | Age: 74
Discharge: HOME | End: 2024-05-28
Payer: MEDICARE

## 2024-05-28 ENCOUNTER — TELEPHONE (OUTPATIENT)
Dept: ENDOCRINOLOGY | Facility: CLINIC | Age: 74
End: 2024-05-28
Payer: MEDICARE

## 2024-05-28 DIAGNOSIS — E21.3 HYPERPARATHYROIDISM (MULTI): ICD-10-CM

## 2024-05-28 DIAGNOSIS — C50.919 MALIGNANT NEOPLASM OF FEMALE BREAST, UNSPECIFIED ESTROGEN RECEPTOR STATUS, UNSPECIFIED LATERALITY, UNSPECIFIED SITE OF BREAST (MULTI): ICD-10-CM

## 2024-05-28 PROCEDURE — 77080 DXA BONE DENSITY AXIAL: CPT | Performed by: RADIOLOGY

## 2024-05-28 PROCEDURE — 77080 DXA BONE DENSITY AXIAL: CPT

## 2024-05-28 RX ORDER — ANASTROZOLE 1 MG/1
1 TABLET ORAL DAILY
Qty: 90 TABLET | Refills: 0 | Status: SHIPPED | OUTPATIENT
Start: 2024-05-28 | End: 2024-08-26

## 2024-05-28 NOTE — TELEPHONE ENCOUNTER
Prior Auth for prolia pending determination  Submitted on 5/28 via [facility]    KEY: [id number]

## 2024-05-31 NOTE — TELEPHONE ENCOUNTER
[facility] sent request on 5/30 stating that Farxiga not covered and preferred alternatives still Jardiance 25mg/Invokana 300mg.  Looked up CMM key- shows denial for Farxiga still.

## 2024-06-11 PROBLEM — Z08 ENCOUNTER FOR FOLLOW-UP SURVEILLANCE OF BREAST CANCER: Status: ACTIVE | Noted: 2024-06-11

## 2024-06-11 PROBLEM — Z85.3 ENCOUNTER FOR FOLLOW-UP SURVEILLANCE OF BREAST CANCER: Status: ACTIVE | Noted: 2024-06-11

## 2024-06-11 PROBLEM — Z79.811 AROMATASE INHIBITOR USE: Status: ACTIVE | Noted: 2024-06-11

## 2024-06-11 NOTE — PROGRESS NOTES
Patient ID: Saige Yates is a 73 y.o. female.  BREAST CANCER DIAGNOSIS:   Breast         AJCC Edition: 7th (AJCC), Diagnosis Date: 23-Apr-2015, IIA, T1c pN1a M0         Treatment History:     Screening mammogram on 11/17/2014 showed a left breast asymmetry    Diagnostic left breast mammogram and left breast ultrasound December 2, 2014 demonstrated an irregular, poorly circumscribed mass with angulated margins at 1:00, 7 cm FN measuring 0.4 x 0.3 x 0.5 cm. Ultrasound of left axillary lymph nodes indicate no  abnormalities.   Ultrasound guided core biopsy of left breast mass on 1/29/2015 revealed invasive ductal carcinoma, grade 1, ER positive at 95%, WV positive 80%, HER 3 equivocal and FISH not amplified at 1.41   Left partial mastectomy with sentinel lymph node biopsy on 4/15/2015 revealed invasive ductal carcinoma, grade 2, tumor size 1.4 cm, negative margins, LVI present, 1 out of 3 lymph nodes positive for macrometastases (>0.2 cm), [id number], stage IIA   Patient declined chemotherapy    Radiation therapy completed on July 20,2015   Started Anastrozole on 8/5/2015       Past Medical History: Saige has a past medical history of Abnormal findings on diagnostic imaging of other specified body structures (12/08/2018), Abnormal levels of other serum enzymes (01/14/2017), Body mass index (BMI) 21.0-21.9, adult (11/24/2021), Body mass index (BMI) 22.0-22.9, adult (06/06/2022), Body mass index (BMI) 23.0-23.9, adult (07/07/2021), Body mass index (BMI) 24.0-24.9, adult (09/06/2019), Encounter for therapeutic drug level monitoring (03/12/2018), Essential (primary) hypertension (08/24/2017), 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  Surgical History:  Saige has a past surgical history that includes Gallbladder surgery (2015); Other surgical history (2018); Colonoscopy (2017); Other surgical history (2015); [place] lymph node biopsy (2015); Breast biopsy (2015); MR angio head wo IV contrast (2021); and Breast lumpectomy (Left).  Social History:  She is , lives in a house with 2 sons and one daughter, completed high school    Social Substance History:  ·  Social History denies smoking, alcohol and drug use   ·  Smoking Status never smoker (1)   ·  Tobacco Use denies   ·  Alcohol Use denies   ·  Drug Use denies   ·  Additional History         Breast Cancer Risk Factors:  , Had her menarche at age 11 years, 1st pregnancy at age 19 years, cannot recall age at menopause, never used BCP or HRT    Family History:    Family History   Problem Relation Name Age of Onset    Other (cardiovascular disorder) Father      Stroke Father      Arthritis Sister       Family Oncology History:  Cancer-related family history is not on file.    HISTORY OF PRESENT ILLNESS:  Saige Yates is a 73 y.o. female who presents today for Breast cancer treatment follow-up and surveillance. She is compliant on daily anastrazole. She is somewhat confused and aloof today- is accompanied by her son today- Noah. She did complete the mammogram today.     She is seeing  endocrinologist Dr Dunne- has  not been started on medication for osteoporosis. She takes Vit D daily and calcium as advised per endocrinology.      She denies any chest pain or breathing issues, no cough. She reports she was supposed to see a cardiologist for \"fast heart rate.\"     She is confused today- is somewhat forgetful and reports hearing is poor " "today. She reports poor vision, has an apt next month. She reports issues with headaches and on and off. She reports feeling dizzy this morning which has improved since my MA has given her water and a snack.  She is under the care of an eye dr for chronic dry eyes. She reports a fall a few months ago but does not think she had any injuries with the fall. Son reports mother did have a minor cut from the fall.      She reports baseline pain in her fingers and hands from arthritis but otherwise denies any new or unexplained bone aches or pains.      She denies any skin lesions or masses, oral sores lesions or infections. She reports skin dryness.      She reports a normal appetite- son reports mother continues to loose weight down 40-50lbs.  She reports normal bowel movements, has frequent urination     She denies any issues with sleep however she is up frequently for urination at night 3-4 times. She reports feeling very fatigue and feels \"off today.\"    Review of Systems - Oncology  ROS 14 points performed, See HPI for exceptions    OBJECTIVE:    VS / Pain:  /85   Pulse 90   Temp 36.7 °C (98 °F)   SpO2 94%   BSA: There is no height or weight on file to calculate BSA.   Pain Scale: 0  ECO- Ambulatory and  capable of all selfcare; unable to carry out work activities.  Up and about > 50% of waking hrs.             Physical Exam  Constitutional: Well developed, awake/alert/oriented x4, no distress, alert and cooperative  EYES: Sclera clear  ENMT: mucous membranes moist, no apparent injury, no lesions seen  Head/Neck: Neck supple, no apparent injury, thyroid without mass or tenderness, No JVD, trachea midline, no bruits  Respiratory / Thoracic: Patent airways, clear to all lobes, normal breath sounds with good chest expansion, thorax symmetric.  Cardiovascular: Regular, rate and rhythm, no murmurs, 2+ equal pulses of the extremities, normal auscultated S 1and S 2  GI: Nondistended, soft, non-tender, no " rebound tenderness or guarding, no masses palpable, no organomegaly, +BS, no bruits  Musculoskeletal: ROM intact, no joint swelling, normal strength, no spinal tenderness  Extremities: normal extremities, no cyanosis edema, contusions or wounds, no clubbing  Neurological: alert and oriented x4, intact senses, motor, response and reflexes, normal strength    Breast: S/p Left partial mastectomy no palpable masses or lesions in either breast     Lymphatic: No cervical, supraclavicular, infraclavicular or axillary lymphadenopathy  Psychological: Appropriate and talkative mood and behavior  Skin: Warm and skin is generally dry, no lesions, no rashes, no jaundice.         Diagnostic Results   XR DEXA bone density  Narrative: Interpreted By:  Ronald West,   STUDY:  DEXA BONE DENSITY5/28/2024 10:08 am      INDICATION:  Signs/Symptoms:Hx of osteoporosis. Hx of primary hyperparathyroidism.  Please include forearm..  The patient is a 72 y/o  year old F.      COMPARISON:  Most recent prior is from 04/19/2022..      ACCESSION NUMBER(S):  [id number]      ORDERING CLINICIAN:  AUBREY ROSARIO      TECHNIQUE:  DEXA BONE DENSITY      FINDINGS:  SPINE L1-L4  Bone Mineral Density: 1.194  T-Score 0.1  Z-Score 1.2  Bone Mineral Density change vs baseline:  -0.4%  Bone Mineral Density change vs previous: -1.1%      LEFT FEMUR -TOTAL  Bone Mineral Density: 0.736  T-Score -2.2   Z-Score  -1.4  Bone Mineral Density change vs baseline: -22.0%  Bone Mineral Density change vs previous: -6.8%      LEFT FEMUR -NECK  Bone Mineral Density: 0.761  T-Score -2.0  Z-Score -1.0  Bone Mineral Density change vs baseline: -10.2%  Bone Mineral Density change vs previous: -4.0%      LEFT FOREARM      1/3  Bone Mineral Density: 0.582  T-Score -3.4   Z-Score -1.8  Bone Mineral Density change vs baseline:  -8.3%  Bone Mineral Density change vs previous:  Negative a 0.9%                      World Health Organization (WHO) criteria for  post-menopausal,   Women:  Normal:         T-score at or above -1 SD  Osteopenia:   T-score between -1 and -2.5 SD  Osteoporosis: T-score at or below -2.5 SD          10-year Fracture Risk:  Major Osteoporotic Fracture  5.0  Hip Fracture                        1.4      Note:  If no FRAX score is reported, it is because:  Some T-score for Spine Total or Hip Total or Femoral Neck at or below  -2.5      This exam was performed at [facility] on a GE Lunar  Prodigy Advance Dexa Unit.      Impression: DEXA:  According to World Health Organization criteria,  classification is osteoporosis.      Followup recommended in two years or sooner as clinically warranted.      All images and detailed analysis are available on the  Radiology  PACS.      MACRO:  None      Signed by: Ronald West 5/28/2024 11:03 AM  Dictation workstation:   [id number]     6-12-24 Mammogram Pending radiology read    Lab Results   Component Value Date    WBC 6.9 12/28/2023    HGB 14.4 12/28/2023    HCT 46.8 (H) 12/28/2023    MCV 87 12/28/2023     12/28/2023          Chemistry    Lab Results   Component Value Date/Time     03/30/2024 0957    K 4.0 03/30/2024 0957     03/30/2024 0957    CO2 31 03/30/2024 0957    BUN 32 (H) 03/30/2024 0957    CREATININE 1.49 (H) 03/30/2024 0957    Lab Results   Component Value Date/Time    CALCIUM 10.8 (H) 03/30/2024 0957    ALKPHOS 109 03/30/2024 0957    AST 17 03/30/2024 0957    ALT 22 03/30/2024 0957    BILITOT 1.2 03/30/2024 0957         Assessment/Plan   Malignant neoplasm of breast (Multi), Pathologic: Stage IA (pT1c, pN1a, cM0, G2, ER+, MA+, HER2-)  Saige was seen today for follow-up.  Diagnoses and all orders for this visit:  Malignant neoplasm of left female breast, unspecified estrogen receptor status, unspecified site of breast (Multi) (Primary)  -     BI mammo bilateral screening tomosynthesis; Future  -     Clinic Appointment Request Follow up; [facility]ANDI BLANCAS;  Future  -     Referral to Hematology and Oncology; Future  Osteoporosis without current pathological fracture, unspecified osteoporosis type  -     BI mammo bilateral screening tomosynthesis; Future  -     Clinic Appointment Request Follow up; RANDI BLANCAS; Future  -     Referral to Hematology and Oncology; Future  Aromatase inhibitor use  -     BI mammo bilateral screening tomosynthesis; Future  -     Clinic Appointment Request Follow up; RANDI BLANCAS; Future  -     Referral to Hematology and Oncology; Future  Encounter for follow-up surveillance of breast cancer  -     BI mammo bilateral screening tomosynthesis; Future  -     Clinic Appointment Request Follow up; RANDI BLANCAS; Future  -     Referral to Hematology and Oncology; Future  Weakness  -     BI mammo bilateral screening tomosynthesis; Future  -     Clinic Appointment Request Follow up; RANDI BLANCAS; Future  -     Referral to Hematology and Oncology; Future  Other fatigue  -     BI mammo bilateral screening tomosynthesis; Future  -     Clinic Appointment Request Follow up; RANDI BLANCAS; Future  -     Referral to Hematology and Oncology; Future  Polycythemia vera (Multi)  -     Referral to Hematology and Oncology; Future    Problem List Items Addressed This Visit       Osteoporosis    Relevant Orders    BI mammo bilateral screening tomosynthesis    Clinic Appointment Request Follow up; RANDI BLANCAS    Referral to Hematology and Oncology    Malignant neoplasm of breast (Multi) - Primary    Relevant Orders    BI mammo bilateral screening tomosynthesis    Clinic Appointment Request Follow up; RANDI BLANCAS    Referral to Hematology and Oncology    Aromatase inhibitor use    Relevant Orders    BI mammo bilateral screening tomosynthesis    Clinic Appointment Request Follow up; RANDI BLANCAS    Referral to Hematology and Oncology    Encounter for follow-up surveillance of breast cancer    Relevant Orders    BI  mammo bilateral screening tomosynthesis    Clinic Appointment Request Follow up; RANDI BLANCAS    Referral to Hematology and Oncology    Weakness    Relevant Orders    BI mammo bilateral screening tomosynthesis    Clinic Appointment Request Follow up; RANDI BLANCAS    Referral to Hematology and Oncology    Other fatigue    Relevant Orders    BI mammo bilateral screening tomosynthesis    Clinic Appointment Request Follow up; RANDI BLANCAS    Referral to Hematology and Oncology    Polycythemia vera (Multi)    Relevant Orders    Referral to Hematology and Oncology      IIA,  T1c pN1a M0 Grade 2  Left-sided invasive ductal carcinoma April 2015. Estrogen receptor positive at 95%, progesterone receptor positive at 80%, and HER-2/olga lidia equivocal and FISH not  amplified at 1.41. S/p left partial mastectomy with SLNB on 04/15/2015. Tumor size was 1.4 cm with 1/3 LNs involved with cancer. Was recommended adjuvant chemotherapy and patient declined. S/p Xrt 7/2015. Initiated on Anastrazole 8/2015.     No treatment related toxicities today. Reviewed CTS-5, is considered intermediate risk. Plan to complete Aromatase inhibitor course for 9 years this August 2024 given worsening bone density, age and co morbidities. Son and pt is agreeable to this plan.    New worsening fatigue, confusion and weakness. I have advised pt and son to go to ED for further evaluation- transferred to ED triage via WC with son.     No evidence of breast cancer recurrence today on clinical breast exam. Pending Mammogram read today.  RTC in 1 year same day as mammogram     Bone Health.  Osteoporosis- updated DEXA 5/2024 DEXA with worsening osteoporosis T-score -3.4. Was recommended PROLIA per endocrine team has not yet started this however is on Calcium. She is under the care of Dr. Dunne endocrine      Polycythemia.  Previously under the care of Dr. Scott, was supposed to transition to Dr Steen, patient canceled apt in March 2024. We  have discussed she was to be seen Q 4 months with labs- I have reached out to this team to help get her established. I have also placed an order for malignant hematology as a Dr Scott transfer      General [facility] Maintenance  PCP Dr Snyder annually and as needed      At least 25 minutes of direct consultation was spent with the patient today reviewing her cancer care plan, educating and answering questions regarding ongoing follow up, greater than 50% in counseling and coordination of care.      Treatment Plan:  [No matching plan found]      Thank you for the opportunity to be involved in the care of Saige Yates.   We discussed the clinical significance of diagnosis, goals of care and treatment plan in detail.   Please do not hesitate to reach out with any questions. Thank you.     -------------------------------------------------------------------------------------------------------------------------------  Harmony Hayes MSN, APRN, FNP-C  [facility]  Division of Medical Oncology- Breast   Collaborating Physician Dr. Sergio Calvert   [facility] [address]lo[address] [place] [place] [address]  Phone: [phone]  Fax: [phone]  Available via Secure Chat    Confidential Peer Review Document-  Privilege  Privileged Pursuant to [place] Revised Code Section 2305.24, .25, .251 & .252

## 2024-06-12 ENCOUNTER — APPOINTMENT (OUTPATIENT)
Dept: RADIOLOGY | Facility: HOSPITAL | Age: 74
End: 2024-06-12
Payer: MEDICARE

## 2024-06-12 ENCOUNTER — HOSPITAL ENCOUNTER (OUTPATIENT)
Dept: RADIOLOGY | Facility: CLINIC | Age: 74
Discharge: HOME | End: 2024-06-12
Payer: MEDICARE

## 2024-06-12 ENCOUNTER — OFFICE VISIT (OUTPATIENT)
Dept: HEMATOLOGY/ONCOLOGY | Facility: CLINIC | Age: 74
End: 2024-06-12
Payer: MEDICARE

## 2024-06-12 ENCOUNTER — APPOINTMENT (OUTPATIENT)
Dept: CARDIOLOGY | Facility: HOSPITAL | Age: 74
End: 2024-06-12
Payer: MEDICARE

## 2024-06-12 ENCOUNTER — HOSPITAL ENCOUNTER (INPATIENT)
Facility: HOSPITAL | Age: 74
LOS: 3 days | Discharge: HOME | End: 2024-06-15
Attending: EMERGENCY MEDICINE | Admitting: HOSPITALIST
Payer: MEDICARE

## 2024-06-12 VITALS
TEMPERATURE: 98 F | SYSTOLIC BLOOD PRESSURE: 119 MMHG | HEART RATE: 90 BPM | DIASTOLIC BLOOD PRESSURE: 85 MMHG | OXYGEN SATURATION: 94 %

## 2024-06-12 VITALS — WEIGHT: 110.89 LBS | BODY MASS INDEX: 18.48 KG/M2 | HEIGHT: 65 IN

## 2024-06-12 DIAGNOSIS — R68.89 OTHER GENERAL SYMPTOMS AND SIGNS: ICD-10-CM

## 2024-06-12 DIAGNOSIS — R53.83 OTHER FATIGUE: ICD-10-CM

## 2024-06-12 DIAGNOSIS — Z85.3 ENCOUNTER FOR FOLLOW-UP SURVEILLANCE OF BREAST CANCER: ICD-10-CM

## 2024-06-12 DIAGNOSIS — I67.89 OTHER CEREBROVASCULAR DISEASE: ICD-10-CM

## 2024-06-12 DIAGNOSIS — R53.1 WEAKNESS: ICD-10-CM

## 2024-06-12 DIAGNOSIS — Z08 ENCOUNTER FOR FOLLOW-UP SURVEILLANCE OF BREAST CANCER: ICD-10-CM

## 2024-06-12 DIAGNOSIS — C50.912 MALIGNANT NEOPLASM OF LEFT FEMALE BREAST, UNSPECIFIED ESTROGEN RECEPTOR STATUS, UNSPECIFIED SITE OF BREAST (MULTI): Primary | ICD-10-CM

## 2024-06-12 DIAGNOSIS — Z79.811 AROMATASE INHIBITOR USE: ICD-10-CM

## 2024-06-12 DIAGNOSIS — I42.9 CARDIOMYOPATHY, UNSPECIFIED TYPE (MULTI): ICD-10-CM

## 2024-06-12 DIAGNOSIS — I50.42 CHRONIC COMBINED SYSTOLIC AND DIASTOLIC HEART FAILURE (MULTI): ICD-10-CM

## 2024-06-12 DIAGNOSIS — D45 POLYCYTHEMIA VERA (MULTI): ICD-10-CM

## 2024-06-12 DIAGNOSIS — M81.0 OSTEOPOROSIS WITHOUT CURRENT PATHOLOGICAL FRACTURE, UNSPECIFIED OSTEOPOROSIS TYPE: ICD-10-CM

## 2024-06-12 DIAGNOSIS — I63.9 CEREBROVASCULAR ACCIDENT (CVA), UNSPECIFIED MECHANISM (MULTI): Primary | ICD-10-CM

## 2024-06-12 DIAGNOSIS — I71.9 AORTIC ANEURYSM WITHOUT RUPTURE, UNSPECIFIED PORTION OF AORTA (CMS-HCC): ICD-10-CM

## 2024-06-12 PROBLEM — R41.0 CONFUSION: Status: ACTIVE | Noted: 2024-06-12

## 2024-06-12 PROBLEM — K86.89 MASS OF HEAD OF PANCREAS (HHS-HCC): Status: ACTIVE | Noted: 2024-06-12

## 2024-06-12 PROBLEM — R42 DIZZINESS: Status: ACTIVE | Noted: 2024-06-12

## 2024-06-12 LAB
ALBUMIN SERPL BCP-MCNC: 3.6 G/DL (ref 3.4–5)
ALP SERPL-CCNC: 107 U/L (ref 33–136)
ALT SERPL W P-5'-P-CCNC: 19 U/L (ref 7–45)
ANION GAP SERPL CALC-SCNC: 18 MMOL/L (ref 10–20)
APPEARANCE UR: CLEAR
APTT PPP: 29 SECONDS (ref 27–38)
AST SERPL W P-5'-P-CCNC: 18 U/L (ref 9–39)
BILIRUB SERPL-MCNC: 1.1 MG/DL (ref 0–1.2)
BILIRUB UR STRIP.AUTO-MCNC: NEGATIVE MG/DL
BUN SERPL-MCNC: 37 MG/DL (ref 6–23)
CALCIUM SERPL-MCNC: 10.5 MG/DL (ref 8.6–10.3)
CARDIAC TROPONIN I PNL SERPL HS: 12 NG/L (ref 0–13)
CARDIAC TROPONIN I PNL SERPL HS: 9 NG/L (ref 0–13)
CHLORIDE SERPL-SCNC: 106 MMOL/L (ref 98–107)
CO2 SERPL-SCNC: 22 MMOL/L (ref 21–32)
COLOR UR: ABNORMAL
CREAT SERPL-MCNC: 1.56 MG/DL (ref 0.5–1.05)
EGFRCR SERPLBLD CKD-EPI 2021: 35 ML/MIN/1.73M*2
ERYTHROCYTE [DISTWIDTH] IN BLOOD BY AUTOMATED COUNT: 13.8 % (ref 11.5–14.5)
GLUCOSE BLD MANUAL STRIP-MCNC: 225 MG/DL (ref 74–99)
GLUCOSE SERPL-MCNC: 285 MG/DL (ref 74–99)
GLUCOSE UR STRIP.AUTO-MCNC: ABNORMAL MG/DL
HCT VFR BLD AUTO: 55.5 % (ref 36–46)
HGB BLD-MCNC: 17 G/DL (ref 12–16)
INR PPP: 1.2 (ref 0.9–1.1)
KETONES UR STRIP.AUTO-MCNC: NEGATIVE MG/DL
LEUKOCYTE ESTERASE UR QL STRIP.AUTO: NEGATIVE
MCH RBC QN AUTO: 28.8 PG (ref 26–34)
MCHC RBC AUTO-ENTMCNC: 30.6 G/DL (ref 32–36)
MCV RBC AUTO: 94 FL (ref 80–100)
NITRITE UR QL STRIP.AUTO: NEGATIVE
NRBC BLD-RTO: 0 /100 WBCS (ref 0–0)
PH UR STRIP.AUTO: 6.5 [PH]
PLATELET # BLD AUTO: 209 X10*3/UL (ref 150–450)
POTASSIUM SERPL-SCNC: 3.9 MMOL/L (ref 3.5–5.3)
PROT SERPL-MCNC: 8 G/DL (ref 6.4–8.2)
PROT UR STRIP.AUTO-MCNC: ABNORMAL MG/DL
PROTHROMBIN TIME: 13.3 SECONDS (ref 9.8–12.8)
RBC # BLD AUTO: 5.9 X10*6/UL (ref 4–5.2)
RBC # UR STRIP.AUTO: NEGATIVE /UL
RBC #/AREA URNS AUTO: NORMAL /HPF
SODIUM SERPL-SCNC: 142 MMOL/L (ref 136–145)
SP GR UR STRIP.AUTO: 1.02
SQUAMOUS #/AREA URNS AUTO: NORMAL /HPF
UROBILINOGEN UR STRIP.AUTO-MCNC: NORMAL MG/DL
WBC # BLD AUTO: 7.8 X10*3/UL (ref 4.4–11.3)
WBC #/AREA URNS AUTO: NORMAL /HPF

## 2024-06-12 PROCEDURE — 82947 ASSAY GLUCOSE BLOOD QUANT: CPT

## 2024-06-12 PROCEDURE — 81001 URINALYSIS AUTO W/SCOPE: CPT | Performed by: PHYSICIAN ASSISTANT

## 2024-06-12 PROCEDURE — 71250 CT THORAX DX C-: CPT | Mod: FOREIGN READ | Performed by: RADIOLOGY

## 2024-06-12 PROCEDURE — 2060000001 HC INTERMEDIATE ICU ROOM DAILY

## 2024-06-12 PROCEDURE — 99215 OFFICE O/P EST HI 40 MIN: CPT | Performed by: NURSE PRACTITIONER

## 2024-06-12 PROCEDURE — 1159F MED LIST DOCD IN RCRD: CPT | Performed by: NURSE PRACTITIONER

## 2024-06-12 PROCEDURE — 77067 SCR MAMMO BI INCL CAD: CPT

## 2024-06-12 PROCEDURE — 1160F RVW MEDS BY RX/DR IN RCRD: CPT | Performed by: NURSE PRACTITIONER

## 2024-06-12 PROCEDURE — 36415 COLL VENOUS BLD VENIPUNCTURE: CPT | Performed by: EMERGENCY MEDICINE

## 2024-06-12 PROCEDURE — 99223 1ST HOSP IP/OBS HIGH 75: CPT | Performed by: PHYSICIAN ASSISTANT

## 2024-06-12 PROCEDURE — 3079F DIAST BP 80-89 MM HG: CPT | Performed by: NURSE PRACTITIONER

## 2024-06-12 PROCEDURE — 2500000004 HC RX 250 GENERAL PHARMACY W/ HCPCS (ALT 636 FOR OP/ED): Performed by: HOSPITALIST

## 2024-06-12 PROCEDURE — 4010F ACE/ARB THERAPY RXD/TAKEN: CPT | Performed by: NURSE PRACTITIONER

## 2024-06-12 PROCEDURE — 99285 EMERGENCY DEPT VISIT HI MDM: CPT | Mod: 25

## 2024-06-12 PROCEDURE — 3062F POS MACROALBUMINURIA REV: CPT | Performed by: NURSE PRACTITIONER

## 2024-06-12 PROCEDURE — 70551 MRI BRAIN STEM W/O DYE: CPT | Performed by: SURGERY

## 2024-06-12 PROCEDURE — 82565 ASSAY OF CREATININE: CPT | Performed by: EMERGENCY MEDICINE

## 2024-06-12 PROCEDURE — 96374 THER/PROPH/DIAG INJ IV PUSH: CPT | Mod: 59

## 2024-06-12 PROCEDURE — 70450 CT HEAD/BRAIN W/O DYE: CPT | Performed by: RADIOLOGY

## 2024-06-12 PROCEDURE — 2500000004 HC RX 250 GENERAL PHARMACY W/ HCPCS (ALT 636 FOR OP/ED): Performed by: EMERGENCY MEDICINE

## 2024-06-12 PROCEDURE — 85610 PROTHROMBIN TIME: CPT | Performed by: EMERGENCY MEDICINE

## 2024-06-12 PROCEDURE — 1036F TOBACCO NON-USER: CPT | Performed by: NURSE PRACTITIONER

## 2024-06-12 PROCEDURE — 3048F LDL-C <100 MG/DL: CPT | Performed by: NURSE PRACTITIONER

## 2024-06-12 PROCEDURE — 3052F HG A1C>EQUAL 8.0%<EQUAL 9.0%: CPT | Performed by: NURSE PRACTITIONER

## 2024-06-12 PROCEDURE — 84484 ASSAY OF TROPONIN QUANT: CPT | Mod: 91 | Performed by: EMERGENCY MEDICINE

## 2024-06-12 PROCEDURE — 74176 CT ABD & PELVIS W/O CONTRAST: CPT | Mod: FOREIGN READ | Performed by: RADIOLOGY

## 2024-06-12 PROCEDURE — 93005 ELECTROCARDIOGRAM TRACING: CPT

## 2024-06-12 PROCEDURE — 70551 MRI BRAIN STEM W/O DYE: CPT

## 2024-06-12 PROCEDURE — 85027 COMPLETE CBC AUTOMATED: CPT | Performed by: EMERGENCY MEDICINE

## 2024-06-12 PROCEDURE — 2500000001 HC RX 250 WO HCPCS SELF ADMINISTERED DRUGS (ALT 637 FOR MEDICARE OP): Performed by: HOSPITALIST

## 2024-06-12 PROCEDURE — 70547 MR ANGIOGRAPHY NECK W/O DYE: CPT

## 2024-06-12 PROCEDURE — 74176 CT ABD & PELVIS W/O CONTRAST: CPT

## 2024-06-12 PROCEDURE — 70450 CT HEAD/BRAIN W/O DYE: CPT

## 2024-06-12 PROCEDURE — 70544 MR ANGIOGRAPHY HEAD W/O DYE: CPT

## 2024-06-12 PROCEDURE — 84484 ASSAY OF TROPONIN QUANT: CPT | Performed by: EMERGENCY MEDICINE

## 2024-06-12 PROCEDURE — 70547 MR ANGIOGRAPHY NECK W/O DYE: CPT | Performed by: SURGERY

## 2024-06-12 PROCEDURE — 3074F SYST BP LT 130 MM HG: CPT | Performed by: NURSE PRACTITIONER

## 2024-06-12 RX ORDER — LABETALOL HYDROCHLORIDE 5 MG/ML
20 INJECTION, SOLUTION INTRAVENOUS ONCE
Status: COMPLETED | OUTPATIENT
Start: 2024-06-12 | End: 2024-06-12

## 2024-06-12 RX ORDER — HYDRALAZINE HYDROCHLORIDE 20 MG/ML
10 INJECTION INTRAMUSCULAR; INTRAVENOUS
Status: ACTIVE | OUTPATIENT
Start: 2024-06-12 | End: 2024-06-14

## 2024-06-12 RX ORDER — CALCIUM CARBONATE 200(500)MG
1250 TABLET,CHEWABLE ORAL 2 TIMES DAILY
Status: DISCONTINUED | OUTPATIENT
Start: 2024-06-12 | End: 2024-06-15 | Stop reason: HOSPADM

## 2024-06-12 RX ORDER — ATORVASTATIN CALCIUM 40 MG/1
40 TABLET, FILM COATED ORAL NIGHTLY
Status: DISCONTINUED | OUTPATIENT
Start: 2024-06-12 | End: 2024-06-15 | Stop reason: HOSPADM

## 2024-06-12 RX ORDER — LABETALOL HYDROCHLORIDE 5 MG/ML
10 INJECTION, SOLUTION INTRAVENOUS EVERY 10 MIN PRN
Status: DISCONTINUED | OUTPATIENT
Start: 2024-06-12 | End: 2024-06-13

## 2024-06-12 RX ORDER — INSULIN LISPRO 100 [IU]/ML
0-5 INJECTION, SOLUTION INTRAVENOUS; SUBCUTANEOUS
Status: DISCONTINUED | OUTPATIENT
Start: 2024-06-13 | End: 2024-06-15 | Stop reason: HOSPADM

## 2024-06-12 RX ORDER — METOPROLOL SUCCINATE 50 MG/1
100 TABLET, EXTENDED RELEASE ORAL DAILY
Status: DISCONTINUED | OUTPATIENT
Start: 2024-06-13 | End: 2024-06-15 | Stop reason: HOSPADM

## 2024-06-12 RX ORDER — DEXTROSE 50 % IN WATER (D50W) INTRAVENOUS SYRINGE
25
Status: DISCONTINUED | OUTPATIENT
Start: 2024-06-12 | End: 2024-06-15 | Stop reason: HOSPADM

## 2024-06-12 RX ORDER — ANASTROZOLE 1 MG/1
1 TABLET ORAL DAILY
COMMUNITY

## 2024-06-12 RX ORDER — POLYETHYLENE GLYCOL 3350 17 G/17G
17 POWDER, FOR SOLUTION ORAL DAILY
Status: DISCONTINUED | OUTPATIENT
Start: 2024-06-12 | End: 2024-06-15 | Stop reason: HOSPADM

## 2024-06-12 RX ORDER — DEXTROSE 50 % IN WATER (D50W) INTRAVENOUS SYRINGE
12.5
Status: DISCONTINUED | OUTPATIENT
Start: 2024-06-12 | End: 2024-06-15 | Stop reason: HOSPADM

## 2024-06-12 RX ORDER — HYDRALAZINE HYDROCHLORIDE 25 MG/1
25 TABLET, FILM COATED ORAL EVERY 6 HOURS PRN
Status: DISCONTINUED | OUTPATIENT
Start: 2024-06-14 | End: 2024-06-15 | Stop reason: HOSPADM

## 2024-06-12 ASSESSMENT — COLUMBIA-SUICIDE SEVERITY RATING SCALE - C-SSRS
1. IN THE PAST MONTH, HAVE YOU WISHED YOU WERE DEAD OR WISHED YOU COULD GO TO SLEEP AND NOT WAKE UP?: NO
6. HAVE YOU EVER DONE ANYTHING, STARTED TO DO ANYTHING, OR PREPARED TO DO ANYTHING TO END YOUR LIFE?: NO
2. HAVE YOU ACTUALLY HAD ANY THOUGHTS OF KILLING YOURSELF?: NO

## 2024-06-12 ASSESSMENT — PAIN SCALES - GENERAL
PAINLEVEL_OUTOF10: 0 - NO PAIN
PAINLEVEL_OUTOF10: 0 - NO PAIN

## 2024-06-12 ASSESSMENT — PAIN - FUNCTIONAL ASSESSMENT: PAIN_FUNCTIONAL_ASSESSMENT: 0-10

## 2024-06-12 NOTE — ED PROVIDER NOTES
HPI   Chief Complaint   Patient presents with    Dizziness       HPI  Patient is a 73-year-old female with a past medical history significant for prior CVA with unclear residual deficits, diabetes type 2, remote breast cancer status post radiation therapy now currently in remission, hypertension presents emergency room with chief complaint of dizziness.  Patient had an appointment this morning to undergo a mammogram which she had scheduled as a regular follow-up.  While there the staff noted that she was walking a little slow and she told them that she was feeling a little dizzy.  She denies any other symptoms including focal weakness, chest pain, shortness of breath, room spinning sensation, nausea or vomiting.  The son, at bedside, is concerned that she has lost about 40 pounds in 1 year of unintentional weight loss.  Unclear if they have discussed this with her primary care physician as she states that she has not seen her in a while.  Patient is a very poor historian.  Denies any current complaints including any dizziness.  Has not been sick recently.  Patient did not have breakfast.      PMHx: As above  PSHx: Denies pertinent  FamilyHx: Denies  SocialHx: Denies  Allergies: Penicillins  Medications: See Medication Reconciliation includes Plavix    ROS  General: Denies fever, sweating, weight loss/gain  Eyes: Denies change in vision, double vision  ENT: Denies Sore throat, nasal drainage, dental caries  Cardiovascular: Denies chest pain  Respiratory: Denies shortness of breath, coughing, wheezing  Gastrointestinal: Denies abdominal pain, nausea, vomiting, diarrhea  Genitourinary: Denies problems urinating  Musculoskeletal: Denies leg/calf pain, neck pain, back pain, joint pain  Skin: Denies rashes  Lymphatic: Denies swollen nodes, ankle swelling  Neurology: Denies headache, fainting, dizziness, numbness, weakness  Psychiatric: Denies anxiety, depression      Physical Exam    GENERAL: Awake and Alert, No Acute  Distress  HEENT: AT/NC, PERRL, EOMI, Normal Oropharynx, No Signs of Dehydration  NECK: Normal Inspection, No JVD  CARDIOVASCULAR: RRR, No M/R/G  RESPIRATORY: CTA Bilaterally, No Wheezes, Rales or Rhonchi, Chest Wall Non-tender  ABDOMEN: Soft, non-tender abdomen, Normal Bowel Sounds, No Distention  BACK: No CVA Tenderness  SKIN: Normal Color, Warm, Dry, No Rashes   EXTREMITIES: Non-Tender, Full ROM, No Pedal Edema  NEURO: A&O x 3, Normal Motor and Sensation, Normal Mood and Affect    Nursing Assessment and Vitals Reviewed    EKG on arrival showed a normal sinus rhythm at 82 bpm.  There are no significant interval prolongations.  There are T wave inversions in lead III through 6.  Some are new from prior.  No ischemic ST changes.  There is left axis deviation.    Medical Decision  Patient is a 73-year-old female with a past medical history significant for prior CVA with unclear residual deficits, diabetes type 2, remote breast cancer status post radiation therapy now currently in remission, hypertension presents emergency room with chief complaint of dizziness.  Patient had an appointment this morning to undergo a mammogram which she had scheduled as a regular follow-up.  While there the staff noted that she was walking a little slow and she told them that she was feeling a little dizzy.  She denies any other symptoms including focal weakness, chest pain, shortness of breath, room spinning sensation, nausea or vomiting.  The son, at bedside, is concerned that she has lost about 40 pounds in 1 year of unintentional weight loss.  Unclear if they have discussed this with her primary care physician as she states that she has not seen her in a while.  Patient is a very poor historian.  Denies any current complaints including any dizziness.  Has not been sick recently.    Evaluation patient is well-appearing and in no acute distress.  Lungs are clear and heart is regular in rhythm.  Abdomen is soft nontender nondistended.   Patient has some weakness in the right upper extremity that she only now noted.  Denies sensory deficits but it is persistent on exam.  Right lower extremity is unremarkable.  Will perform a workup including CT head, chest abdomen and pelvis given history, unexplained weight loss.    Review of records shows the patient was given water and a snack at her appointment with mammogram team this morning.  In this visit she is also noted to be confused, forgetful and reporting poor vision and hearing.  These appear to be chronic.  Per that report the dizziness improved after she was given a snack and water.  She was referred to hematology and oncology for outpatient follow-up.    Workup for patient included labs that revealed elevation in BUN and creatinine consistent with history.  She has hyperglycemia without signs of acidosis.  She has hemoglobin of 17 and no leukocytosis.  CT of the head did show age-indeterminate but possibly subacute lacunar infarct developing the left thalamic region with a possible tiny focus of attenuation that could represent component of hemorrhage.  Remote right thalamic infarct is unchanged.  Patient was discussed with neurosurgery who agrees with plan to obtain MRIs and admission.  They will consult.  CT of the chest abdomen pelvis showed a 4 cm ascending aortic aneurysm, intra and extrahepatic biliary ductal dilatation, fullness of the pancreatic head including possible underlying mass.  Other findings per report.  Patient was given labetalol for blood pressure.  She is admitted for further workup and management in stable condition without any neurologic developments while in the ED.                          [place] Coma Scale Score: 15         NIH Stroke Scale: 0             Patient History   Past Medical History:   Diagnosis Date    Abnormal findings on diagnostic imaging of other specified body structures 12/08/2018    Abnormal chest CT    Abnormal levels of other serum enzymes  01/14/2017    Elevated liver enzymes    Body mass index (BMI) 21.0-21.9, adult 11/24/2021    BMI 21.0-21.9, adult    Body mass index (BMI) 22.0-22.9, adult 06/06/2022    Body mass index (BMI) of 22.0 to 22.9 in adult    Body mass index (BMI) 23.0-23.9, adult 07/07/2021    BMI 23.0-23.9, adult    Body mass index (BMI) 24.0-24.9, adult 09/06/2019    BMI 24.0-24.9, adult    Encounter for therapeutic drug level monitoring 03/12/2018    Encounter for monitoring anastrozole therapy    Essential (primary) hypertension 08/24/2017    Uncontrolled hypertension    Other cerebral infarction due to occlusion or stenosis of small artery (Multi) 04/08/2019    Lacunar infarction    Other conditions influencing health status 01/22/2018    Malignant neoplasm of left female breast, unspecified site of breast    Other specified disorders of breast 04/26/2016    Postoperative breast asymmetry    Personal history of diseases of the skin and subcutaneous tissue 11/23/2013    History of atopic dermatitis    Personal history of other specified conditions 11/07/2015    History of chest pain    Personal history of transient ischemic attack (TIA), and cerebral infarction without residual deficits 12/07/2019    History of transient cerebral ischemia    Unspecified lump in the left breast, unspecified quadrant 08/24/2017    Breast mass, left     Past Surgical History:   Procedure Laterality Date    BREAST BIOPSY  02/26/2015    Biopsy Breast Percutaneous Needle Core    BREAST LUMPECTOMY Left     COLONOSCOPY  05/20/2017    Complete Colonoscopy    GALLBLADDER SURGERY  02/09/2015    Gallbladder Surgery    MR HEAD ANGIO WO IV CONTRAST  11/12/2021    MR HEAD ANGIO WO IV CONTRAST 11/12/2021 [facility]    OTHER SURGICAL HISTORY  04/23/2018    Parathyroid Resection    OTHER SURGICAL HISTORY  04/27/2015    Left Breast Partial Mastectomy    SENTINEL LYMPH NODE BIOPSY  04/27/2015    [place] Lymph Node Biopsy     Family History   Problem Relation  Name Age of Onset    Other (cardiovascular disorder) Father      Stroke Father      Arthritis Sister       Social History     Tobacco Use    Smoking status: Never    Smokeless tobacco: Never   Substance Use Topics    Alcohol use: Never    Drug use: Never       Physical Exam   ED Triage Vitals   Temperature Heart Rate Respirations BP   06/12/24 1059 06/12/24 1059 06/12/24 1059 06/12/24 1059   36.8 °C (98.3 °F) 81 16 (!) 133/91      Pulse Ox Temp src Heart Rate Source Patient Position   06/12/24 1059 -- 06/12/24 1230 --   97 %  Monitor       BP Location FiO2 (%)     -- --             Physical Exam    ED Course & MDM   Diagnoses as of 06/12/24 1737   Cerebrovascular accident (CVA), unspecified mechanism (Multi)   Aortic aneurysm without rupture, unspecified portion of aorta (CMS-HCC)       Medical Decision Making      Procedure  Procedures     Theodora Huffman MD  06/12/24 0459

## 2024-06-12 NOTE — ED TRIAGE NOTES
PT PRESENTS TO THE ED FOR DIZZINESS THAT STARTED WHEN SHE WOKE UP TODAY. PT ENDORSES THAT SHE WENT TO BED LAST NIGHT AND WAS FINE. PT ENDORSES THAT SHE WENT TO BED AT 2200. PT DENIES BLOOD THINNERS. PT HAS HX OF STROKE IN 2013.

## 2024-06-12 NOTE — PROGRESS NOTES
Pharmacy Medication History Review    Saige Yates is a 73 y.o. female admitted for No Principal Problem: There is no principal problem currently on the Problem List. Please update the Problem List and refresh.. Pharmacy reviewed the patient's [id number] medications and allergies for accuracy.    The list below reflectives the updated PTA list. Please review each medication in order reconciliation for additional clarification and justification.  Prior to Admission Medications   Prescriptions Last Dose Informant   amLODIPine (Norvasc) 10 mg tablet 6/12/2024 Child, Self   Sig: Take 1 tablet (10 mg) by mouth once daily.   anastrozole (Arimidex) 1 mg tablet 6/12/2024 at AM Child, Self   Sig: Take 1 tablet (1 mg total) by mouth once daily.  Swallow whole with a drink of water.   atorvastatin (Lipitor) 20 mg tablet 6/12/2024 Child, Self   Sig: TAKE 1 TABLET BY MOUTH ONCE  DAILY   b complex vitamins capsule 6/12/2024 Child, Self   Sig: Take 1 tablet by mouth once daily.   calcium carbonate 500 mg calcium (1,250 mg) chewable tablet 6/12/2024 Child, Self   Sig: Chew 1 tablet (1,250 mg) 2 times a day.   denosumab (Prolia) 60 mg/mL syringe Unknown Child, Self   Sig: Physician to inject 1 mL (60 mg total) under the skin every 6 months. For office use only.   dulaglutide (Trulicity) 0.75 mg/0.5 mL pen injector Past Week Child, Self   Sig: Inject 0.75 mg under the skin 1 (one) time per week.   Patient taking differently: Inject 0.75 mg under the skin 1 (one) time per week. SUNDAY   empagliflozin (Jardiance) 10 mg 6/12/2024 Child, Self   Sig: Take 1 tablet (10 mg) by mouth once daily.   lisinopril 40 mg tablet 6/12/2024 Child, Self   Sig: TAKE 1 TABLET BY MOUTH ONCE  DAILY   metoprolol succinate XL (Toprol-XL) 100 mg 24 hr tablet 6/12/2024 Child, Self   Sig: Take 1 tablet (100 mg) by mouth once daily. Do not crush or chew.      Facility-Administered Medications: None         The list below reflectives the updated allergy  list. Please review each documented allergy for additional clarification and justification.  Allergies  Reviewed by Warner Griffin RN on 6/12/2024        Severity Reactions Comments    Other Not Specified Unknown HAIR DYE    Penicillins Not Specified Unknown             Below are additional concerns with the patient's PTA list.  Spoke to patient with son at bedside for med list.    Kerline Aguirre

## 2024-06-12 NOTE — PATIENT INSTRUCTIONS
Annual follow up in 1 year with Harmony GARCIAS, CNP with mammogram    Please go to ED for evaluation for severe fatigue, weakness and confusion today     Please stop the anastrozole- 9 years will be completed in August 2024.    I have placed a referral to the team covering Dr Scott in Hematology for polycythemia vera.    Please schedule the cardiology apt as requested per PCP    Please continue to follow with Dr Bangura    Please call [phone] with any questions or concerns prior to your next office visit.

## 2024-06-12 NOTE — H&P
History Of Present Illness  Saige Yates is a 73 y.o. female presenting with subacute CVA, dizziness, right upper extremity weakness.  Patient with a history of previous CVA, type 2 diabetes, breast cancer status post radiation, etc., presented to the emergency department this morning.  Patient apparently was at her mammogram and felt dizzy and weak and was walking and talking slowly.  They gave her some crackers and her symptoms have completely resolved currently.  Patient denies vertigo but states she felt lightheaded.  She has had a 40 pound weight loss in the past year she has urinary frequency that is chronic.  No other complaints and she has 2 sons with her now who states she is at her baseline currently.    Past medical history: CVA, type 2 diabetes, hypertension, breast cancer status post radiation, CKD, gout, hyperlipidemia, memory loss and mild cognitive impairment, osteoporosis, polycythemia vera, malnutrition, restrictive lung disease, weakness    Medications: Amlodipine, Arimidex, atorvastatin, B vitamins, Prolia, Trulicity, Jardiance, lisinopril, metoprolol    Allergies: Penicillin    Social history: Denies tobacco or alcohol use    ED course: EKG showed a normal sinus rhythm with a heart rate of 82 with some T wave inversions but no signs of acute ischemia  Her metabolic panel showed a glucose of 285, BUN of 37, creatinine 1.56 (consistent with previous studies), GFR of 35, and a calcium that is high at 10.5.  The rest of the CMP was within normal limits.  Total protein was normal at 8.0.  Troponin were within normal limits x 2  INR INR was 1.2  CBC showed a normal white count, hemoglobin 17.0 and hematocrit 55.5, platelets were normal.  CT of the head showed age-indeterminate but possibly subacute lacunar infarct in the left thalamus there is a tiny focus of hypoattenuation that could be hemorrhage.  CT of the chest abdomen and pelvis revealed no acute process in the chest but she does have an  ascending aortic aneurysm measuring 4 cm.  The abdomen showed intrahepatic and extrahepatic biliary ductal dilatation with fullness of the pancreatic head and some mild renal cysts bilaterally.  There was some wall thickening in the distal colon and rectum no other acute changes.  Patient was given labetalol in the ED for her blood pressure.        Past Medical History:   Diagnosis Date    Abnormal findings on diagnostic imaging of other specified body structures 12/08/2018    Abnormal chest CT    Abnormal levels of other serum enzymes 01/14/2017    Elevated liver enzymes    Body mass index (BMI) 21.0-21.9, adult 11/24/2021    BMI 21.0-21.9, adult    Body mass index (BMI) 22.0-22.9, adult 06/06/2022    Body mass index (BMI) of 22.0 to 22.9 in adult    Body mass index (BMI) 23.0-23.9, adult 07/07/2021    BMI 23.0-23.9, adult    Body mass index (BMI) 24.0-24.9, adult 09/06/2019    BMI 24.0-24.9, adult    Encounter for therapeutic drug level monitoring 03/12/2018    Encounter for monitoring anastrozole therapy    Essential (primary) hypertension 08/24/2017    Uncontrolled hypertension    Other cerebral infarction due to occlusion or stenosis of small artery (Multi) 04/08/2019    Lacunar infarction    Other conditions influencing health status 01/22/2018    Malignant neoplasm of left female breast, unspecified site of breast    Other specified disorders of breast 04/26/2016    Postoperative breast asymmetry    Personal history of diseases of the skin and subcutaneous tissue 11/23/2013    History of atopic dermatitis    Personal history of other specified conditions 11/07/2015    History of chest pain    Personal history of transient ischemic attack (TIA), and cerebral infarction without residual deficits 12/07/2019    History of transient cerebral ischemia    Unspecified lump in the left breast, unspecified quadrant 08/24/2017    Breast mass, left     Past Surgical History:   Procedure Laterality Date    BREAST BIOPSY   02/26/2015    Biopsy Breast Percutaneous Needle Core    BREAST LUMPECTOMY Left     COLONOSCOPY  05/20/2017    Complete Colonoscopy    GALLBLADDER SURGERY  02/09/2015    Gallbladder Surgery    MR HEAD ANGIO WO IV CONTRAST  11/12/2021    MR HEAD ANGIO WO IV CONTRAST 11/12/2021 [facility]    OTHER SURGICAL HISTORY  04/23/2018    Parathyroid Resection    OTHER SURGICAL HISTORY  04/27/2015    Left Breast Partial Mastectomy    SENTINEL LYMPH NODE BIOPSY  04/27/2015    [place] Lymph Node Biopsy     Social History     Tobacco Use    Smoking status: Never    Smokeless tobacco: Never   Substance Use Topics    Alcohol use: Never    Drug use: Never        Family History  Family History   Problem Relation Name Age of Onset    Other (cardiovascular disorder) Father      Stroke Father      Arthritis Sister          Allergies  Other and Penicillins    Review of Systems  Patient denies chest pain, shortness of breath, nausea, vomiting, fever, chills, diarrhea, constipation,  vision changes, rashes, dysuria, paresthesias, vertigo, headache, cough or cold symptoms, or any other complaints at this time. A complete review of systems was done, and is as stated in the history of present illness, is otherwise negative or not pertinent to the complaint.    Physical Exam  Physical exam: Vital signs and nurses notes were reviewed.    General:  no acute distress. Alert and oriented  x 3.     Head: atraumatic and normocephalic    Eyes: Pupils equal round reactive to light, EOMs are intact, conjunctivae is not injected.    Oropharynx: No erythema or exudate noted, no trismus or drooling, buccal mucosa is moist.    Ears:  normal external exam, no swelling or erythema,     Nasal: normal external exam,     Neck: Supple, full range of motion,     Cardiac: Regular rate and rhythm. No murmurs noted.     Pulmonary: Lungs clear bilaterally with good aeration. No adventitious breath sounds. No wheezes rales or rhonchi. No accessory muscle use  no retraction noted.    Abdomen: Soft,  Nontender. No guarding, rigidity, or distention. Normoactive bowel sounds. No pulsatile masses, no bruits.     Extremities:  Full range of motion.  No pitting edema.  Patient can lift and hold each extremity up independently without waver.   on the right is slightly weak.    Skin: No rash seen. Skin is warm and dry     Neuro: Patient is alert and oriented x3. Speech is clear. There is no asymmetry with facial grimaces, and no tongue deviation. Patient moves all extremities independently.  Slightly weaker  strength on the right.  Strong dorsal and plantarflexion of the feet against resistance.  Patient can lift each extremity up independently without waver.  Sensation is intact. No obvious neuro deficits are noted.     Last Recorded Vitals  BP (!) 138/109   Pulse 89   Temp 36.8 °C (98.3 °F)   Resp 18   Wt 49.9 kg (110 lb)   SpO2 97%     Relevant Results  Scheduled medications    Continuous medications    PRN medications      Results for orders placed or performed during the hospital encounter of 06/12/24 (from the past 24 hour(s))   CBC   Result Value Ref Range    WBC 7.8 4.4 - 11.3 x10*3/uL    nRBC 0.0 0.0 - 0.0 /100 WBCs    RBC 5.90 (H) 4.00 - 5.20 x10*6/uL    Hemoglobin 17.0 (H) 12.0 - 16.0 g/dL    Hematocrit 55.5 (H) 36.0 - 46.0 %    MCV 94 80 - 100 fL    MCH 28.8 26.0 - 34.0 pg    MCHC 30.6 (L) 32.0 - 36.0 g/dL    RDW 13.8 11.5 - 14.5 %    Platelets 209 150 - 450 x10*3/uL   Troponin I, High Sensitivity, Initial   Result Value Ref Range    Troponin I, High Sensitivity 9 0 - 13 ng/L   ECG 12 lead   Result Value Ref Range    Ventricular Rate 82 BPM    Atrial Rate 82 BPM    KY Interval 144 ms    QRS Duration 84 ms    QT Interval 396 ms    QTC Calculation(Bazett) 462 ms    P Axis 56 degrees    R Axis -30 degrees    T Axis -50 degrees    QRS Count 13 beats    Q Onset 218 ms    P Onset 146 ms    P Offset 206 ms    T Offset 416 ms    QTC Fredericia 439 ms   Troponin,  High Sensitivity, 1 Hour   Result Value Ref Range    Troponin I, High Sensitivity 12 0 - 13 ng/L   Comprehensive metabolic panel   Result Value Ref Range    Glucose 285 (H) 74 - 99 mg/dL    Sodium 142 136 - 145 mmol/L    Potassium 3.9 3.5 - 5.3 mmol/L    Chloride 106 98 - 107 mmol/L    Bicarbonate 22 21 - 32 mmol/L    Anion Gap 18 10 - 20 mmol/L    Urea Nitrogen 37 (H) 6 - 23 mg/dL    Creatinine 1.56 (H) 0.50 - 1.05 mg/dL    eGFR 35 (L) >60 mL/min/1.73m*2    Calcium 10.5 (H) 8.6 - 10.3 mg/dL    Albumin 3.6 3.4 - 5.0 g/dL    Alkaline Phosphatase 107 33 - 136 U/L    Total Protein 8.0 6.4 - 8.2 g/dL    AST 18 9 - 39 U/L    Bilirubin, Total 1.1 0.0 - 1.2 mg/dL    ALT 19 7 - 45 U/L   Coagulation Screen   Result Value Ref Range    Protime 13.3 (H) 9.8 - 12.8 seconds    INR 1.2 (H) 0.9 - 1.1    aPTT 29 27 - 38 seconds   POCT GLUCOSE   Result Value Ref Range    POCT Glucose 225 (H) 74 - 99 mg/dL     CT head wo IV contrast   Final Result   Age-indeterminate but possibly subacute lacunar infarct developing in   the left thalamus. At the posterior aspect of this a tiny focus of   hypoattenuation could represent a small component of hemorrhage.   Close follow-up recommended. If warranted, MRI could be helpful to   further evaluate.        Remote right thalamic infarct is unchanged.        Mild atrophy. Considerable chronic small vessel ischemic white matter   demyelination unchanged from prior.             MACRO:   Joseph Schoenberger discussed the significance and urgency of this   critical finding by telephone with  DENISE CLAROS on   6/12/2024 at 4:03 pm.  (**-RCF-**) Findings:  See findings.             Signed by: Joseph Schoenberger 6/12/2024 4:07 PM   Dictation workstation:   [id number]      CT chest abdomen pelvis wo IV contrast   Final Result   Chest: No acute cardiopulmonary disease.  Ascending aortic aneurysm   measuring 4.0 cm.   Bilateral thyroid nodules.   ABDOMEN: Intrahepatic and extrahepatic  biliary ductal dilatation.    Fullness of the pancreatic head.  Underlying mass cannot be excluded.   Bilateral nonspecific renal calculi with multiple bilateral renal   cysts.  Complex cyst midpole of the anterior left kidney is   demonstrated.  This appears similar prior ultrasound.  Findings would   better evaluated with contrast.   Pelvis: Wall thickening within the distal colon/rectum.  Please   correlate for colitis/proctitis.  Fibroid uterus.  No other acute   inflammatory changes.  Moderate colonic stool.   Signed by Laytno Morales, DO      MR brain wo IV contrast    (Results Pending)   MR angio head wo IV contrast    (Results Pending)   MR angio neck wo IV contrast    (Results Pending)          Assessment/Plan   Principal Problem:    Cerebrovascular accident (CVA), unspecified mechanism (Multi)  Active Problems:    Dizziness    Mass of head of pancreas ([facility]HS-HCC)      Plan: Stroke protocol initiated, orders placed by Dr. Caldwell.  MR angio of the head and neck and MRI of the brain pending  GI consult for the questionable fullness or mass in the pancreatic head  Neurology and neurosurgery consults pending for the CT results of the head.  Patient is n.p.o. pending bedside swallow eval but then can have a diet if passed.  Neurochecks,  Metoprolol  As needed labetalol and hydralazine ordered  Telemetry  Oxygen as needed  PT, OT, social work consults pending  SCDs ordered  CBC and BMP in the morning  TTE ordered and pending  Atorvastatin and MiraLAX ordered  Sliding scale insulin  Findings, orders, plan etc. all discussed with Dr. Javi Pierre PA-C

## 2024-06-13 ENCOUNTER — APPOINTMENT (OUTPATIENT)
Dept: CARDIOLOGY | Facility: HOSPITAL | Age: 74
End: 2024-06-13
Payer: MEDICARE

## 2024-06-13 ENCOUNTER — APPOINTMENT (OUTPATIENT)
Dept: RADIOLOGY | Facility: HOSPITAL | Age: 74
End: 2024-06-13
Payer: MEDICARE

## 2024-06-13 LAB
ANION GAP SERPL CALC-SCNC: 16 MMOL/L (ref 10–20)
AORTIC VALVE PEAK VELOCITY: 0.91 M/S
AV PEAK GRADIENT: 3.3 MMHG
AVA (PEAK VEL): 2.27 CM2
BASOPHILS # BLD AUTO: 0.02 X10*3/UL (ref 0–0.1)
BASOPHILS NFR BLD AUTO: 0.3 %
BUN SERPL-MCNC: 36 MG/DL (ref 6–23)
CALCIUM SERPL-MCNC: 10.1 MG/DL (ref 8.6–10.3)
CANCER AG19-9 SERPL-ACNC: 23.51 U/ML
CHLORIDE SERPL-SCNC: 108 MMOL/L (ref 98–107)
CO2 SERPL-SCNC: 23 MMOL/L (ref 21–32)
CREAT SERPL-MCNC: 1.55 MG/DL (ref 0.5–1.05)
EGFRCR SERPLBLD CKD-EPI 2021: 35 ML/MIN/1.73M*2
EJECTION FRACTION APICAL 4 CHAMBER: 46.5
EOSINOPHIL # BLD AUTO: 0.07 X10*3/UL (ref 0–0.4)
EOSINOPHIL NFR BLD AUTO: 1.1 %
ERYTHROCYTE [DISTWIDTH] IN BLOOD BY AUTOMATED COUNT: 13.5 % (ref 11.5–14.5)
GLUCOSE BLD MANUAL STRIP-MCNC: 126 MG/DL (ref 74–99)
GLUCOSE BLD MANUAL STRIP-MCNC: 157 MG/DL (ref 74–99)
GLUCOSE BLD MANUAL STRIP-MCNC: 183 MG/DL (ref 74–99)
GLUCOSE BLD MANUAL STRIP-MCNC: 219 MG/DL (ref 74–99)
GLUCOSE BLD MANUAL STRIP-MCNC: 236 MG/DL (ref 74–99)
GLUCOSE BLD MANUAL STRIP-MCNC: 288 MG/DL (ref 74–99)
GLUCOSE SERPL-MCNC: 260 MG/DL (ref 74–99)
HCT VFR BLD AUTO: 49.1 % (ref 36–46)
HGB BLD-MCNC: 16.1 G/DL (ref 12–16)
IMM GRANULOCYTES # BLD AUTO: 0.02 X10*3/UL (ref 0–0.5)
IMM GRANULOCYTES NFR BLD AUTO: 0.3 % (ref 0–0.9)
LEFT ATRIUM VOLUME AREA LENGTH INDEX BSA: 15.7 ML/M2
LEFT VENTRICLE INTERNAL DIMENSION DIASTOLE: 2.62 CM (ref 3.5–6)
LEFT VENTRICULAR OUTFLOW TRACT DIAMETER: 1.9 CM
LV EJECTION FRACTION BIPLANE: 41 %
LYMPHOCYTES # BLD AUTO: 1.47 X10*3/UL (ref 0.8–3)
LYMPHOCYTES NFR BLD AUTO: 23 %
MCH RBC QN AUTO: 29.2 PG (ref 26–34)
MCHC RBC AUTO-ENTMCNC: 32.8 G/DL (ref 32–36)
MCV RBC AUTO: 89 FL (ref 80–100)
MITRAL VALVE E/A RATIO: 0.19
MONOCYTES # BLD AUTO: 0.44 X10*3/UL (ref 0.05–0.8)
MONOCYTES NFR BLD AUTO: 6.9 %
NEUTROPHILS # BLD AUTO: 4.36 X10*3/UL (ref 1.6–5.5)
NEUTROPHILS NFR BLD AUTO: 68.4 %
NRBC BLD-RTO: 0 /100 WBCS (ref 0–0)
PLATELET # BLD AUTO: 258 X10*3/UL (ref 150–450)
POTASSIUM SERPL-SCNC: 3.8 MMOL/L (ref 3.5–5.3)
RBC # BLD AUTO: 5.52 X10*6/UL (ref 4–5.2)
RIGHT VENTRICLE FREE WALL PEAK S': 18.6 CM/S
RIGHT VENTRICLE PEAK SYSTOLIC PRESSURE: 36.9 MMHG
SODIUM SERPL-SCNC: 143 MMOL/L (ref 136–145)
TRICUSPID ANNULAR PLANE SYSTOLIC EXCURSION: 1.9 CM
WBC # BLD AUTO: 6.4 X10*3/UL (ref 4.4–11.3)

## 2024-06-13 PROCEDURE — 97161 PT EVAL LOW COMPLEX 20 MIN: CPT | Mod: GP

## 2024-06-13 PROCEDURE — 2500000001 HC RX 250 WO HCPCS SELF ADMINISTERED DRUGS (ALT 637 FOR MEDICARE OP): Performed by: INTERNAL MEDICINE

## 2024-06-13 PROCEDURE — 82565 ASSAY OF CREATININE: CPT | Performed by: HOSPITALIST

## 2024-06-13 PROCEDURE — 82947 ASSAY GLUCOSE BLOOD QUANT: CPT | Mod: 91

## 2024-06-13 PROCEDURE — 93306 TTE W/DOPPLER COMPLETE: CPT | Performed by: INTERNAL MEDICINE

## 2024-06-13 PROCEDURE — 97116 GAIT TRAINING THERAPY: CPT | Mod: GP

## 2024-06-13 PROCEDURE — 74183 MRI ABD W/O CNTR FLWD CNTR: CPT

## 2024-06-13 PROCEDURE — 82947 ASSAY GLUCOSE BLOOD QUANT: CPT

## 2024-06-13 PROCEDURE — 2550000001 HC RX 255 CONTRASTS: Performed by: INTERNAL MEDICINE

## 2024-06-13 PROCEDURE — 99233 SBSQ HOSP IP/OBS HIGH 50: CPT | Performed by: INTERNAL MEDICINE

## 2024-06-13 PROCEDURE — 99223 1ST HOSP IP/OBS HIGH 75: CPT | Performed by: PSYCHIATRY & NEUROLOGY

## 2024-06-13 PROCEDURE — 85025 COMPLETE CBC W/AUTO DIFF WBC: CPT | Performed by: HOSPITALIST

## 2024-06-13 PROCEDURE — 36415 COLL VENOUS BLD VENIPUNCTURE: CPT | Performed by: NURSE PRACTITIONER

## 2024-06-13 PROCEDURE — 99222 1ST HOSP IP/OBS MODERATE 55: CPT | Performed by: INTERNAL MEDICINE

## 2024-06-13 PROCEDURE — 2500000002 HC RX 250 W HCPCS SELF ADMINISTERED DRUGS (ALT 637 FOR MEDICARE OP, ALT 636 FOR OP/ED): Performed by: HOSPITALIST

## 2024-06-13 PROCEDURE — 36415 COLL VENOUS BLD VENIPUNCTURE: CPT | Performed by: HOSPITALIST

## 2024-06-13 PROCEDURE — 2500000001 HC RX 250 WO HCPCS SELF ADMINISTERED DRUGS (ALT 637 FOR MEDICARE OP): Performed by: HOSPITALIST

## 2024-06-13 PROCEDURE — 76376 3D RENDER W/INTRP POSTPROCES: CPT | Performed by: STUDENT IN AN ORGANIZED HEALTH CARE EDUCATION/TRAINING PROGRAM

## 2024-06-13 PROCEDURE — 93306 TTE W/DOPPLER COMPLETE: CPT

## 2024-06-13 PROCEDURE — 74183 MRI ABD W/O CNTR FLWD CNTR: CPT | Performed by: STUDENT IN AN ORGANIZED HEALTH CARE EDUCATION/TRAINING PROGRAM

## 2024-06-13 PROCEDURE — 86301 IMMUNOASSAY TUMOR CA 19-9: CPT | Mod: AHULAB | Performed by: NURSE PRACTITIONER

## 2024-06-13 PROCEDURE — 1200000002 HC GENERAL ROOM WITH TELEMETRY DAILY

## 2024-06-13 PROCEDURE — A9575 INJ GADOTERATE MEGLUMI 0.1ML: HCPCS | Performed by: INTERNAL MEDICINE

## 2024-06-13 RX ORDER — NAPROXEN SODIUM 220 MG/1
81 TABLET, FILM COATED ORAL DAILY
Status: DISCONTINUED | OUTPATIENT
Start: 2024-06-13 | End: 2024-06-15 | Stop reason: HOSPADM

## 2024-06-13 RX ORDER — CLOPIDOGREL BISULFATE 75 MG/1
75 TABLET ORAL DAILY
Status: DISCONTINUED | OUTPATIENT
Start: 2024-06-13 | End: 2024-06-13

## 2024-06-13 RX ORDER — GADOTERATE MEGLUMINE 376.9 MG/ML
10 INJECTION INTRAVENOUS
Status: COMPLETED | OUTPATIENT
Start: 2024-06-13 | End: 2024-06-13

## 2024-06-13 ASSESSMENT — COGNITIVE AND FUNCTIONAL STATUS - GENERAL
STANDING UP FROM CHAIR USING ARMS: A LITTLE
MOVING TO AND FROM BED TO CHAIR: A LITTLE
CLIMB 3 TO 5 STEPS WITH RAILING: A LITTLE
WALKING IN HOSPITAL ROOM: A LITTLE
MOBILITY SCORE: 20

## 2024-06-13 ASSESSMENT — PAIN SCALES - GENERAL
PAINLEVEL_OUTOF10: 0 - NO PAIN

## 2024-06-13 ASSESSMENT — PAIN - FUNCTIONAL ASSESSMENT
PAIN_FUNCTIONAL_ASSESSMENT: 0-10

## 2024-06-13 ASSESSMENT — ACTIVITIES OF DAILY LIVING (ADL)
ADL_ASSISTANCE: INDEPENDENT
LACK_OF_TRANSPORTATION: NO

## 2024-06-13 NOTE — CONSULTS
Reason For Consult  40 lbs weight loss, ct with pancreatic head fullness     History Of Present Illness  Saige Yates is a 73 y.o. female with history of CVA, diabetes, hypertension, breast cancer, CKD, presented with complaints of dizziness, right upper extremity weakness, found with subacute CVA.  Patient also reported 40 pound weight loss.  Denies abdominal pain, nausea, vomiting, complaints of constipation, no melena or hematochezia.  Work up in ED showed normal LFTs. CT c/a/p showed Intrahepatic and extrahepatic biliary ductal dilatation; fullness of the pancreatic head.  GI consulted.     Normal colonoscopy in 2013    Past Medical History  CVA, type 2 diabetes, hypertension, breast cancer status post radiation, CKD, gout, hyperlipidemia, memory loss and mild cognitive impairment, osteoporosis, polycythemia vera, malnutrition, restrictive lung disease, weakness     Surgical History  She has a past surgical history that includes Gallbladder surgery (02/09/2015); Other surgical history (04/23/2018); Colonoscopy (05/20/2017); Other surgical history (04/27/2015); [place] lymph node biopsy (04/27/2015); Breast biopsy (02/26/2015); MR angio head wo IV contrast (11/12/2021); and Breast lumpectomy (Left).     Social History  She reports that she has never smoked. She has never used smokeless tobacco. She reports that she does not drink alcohol and does not use drugs.    Family History  Family History   Problem Relation Name Age of Onset    Other (cardiovascular disorder) Father      Stroke Father      Arthritis Sister          Allergies  Other and Penicillins    Review of Systems  10 systems reviewed and negative other than HPI     Physical Exam  Physical Exam  Vitals reviewed.   Constitutional:       Appearance: Normal appearance.   HENT:      Head: Normocephalic and atraumatic.      Nose: Nose normal.      Mouth/Throat:      Mouth: Mucous membranes are moist.      Pharynx: Oropharynx is clear.   Eyes:       "Conjunctiva/sclera: Conjunctivae normal.   Cardiovascular:      Rate and Rhythm: Normal rate and regular rhythm.      Pulses: Normal pulses.      Heart sounds: Normal heart sounds.   Pulmonary:      Effort: Pulmonary effort is normal.      Breath sounds: Normal breath sounds.   Abdominal:      General: Bowel sounds are normal. There is no distension.      Palpations: Abdomen is soft.      Tenderness: There is no abdominal tenderness. There is no guarding.   Musculoskeletal:      Cervical back: Neck supple.   Skin:     General: Skin is dry.   Neurological:      General: No focal deficit present.      Mental Status: She is alert and oriented to person, place, and time.   Psychiatric:         Mood and Affect: Mood normal.         Behavior: Behavior normal.            Last Recorded Vitals  Blood pressure (!) 159/126, pulse 85, temperature 36.8 °C (98.3 °F), resp. rate 18, height 1.651 m (5' 5\"), weight 49.9 kg (110 lb), SpO2 94%.    Relevant Results      Scheduled medications  aspirin, 81 mg, oral, Daily  atorvastatin, 40 mg, oral, Nightly  calcium carbonate, 1,250 mg, oral, BID  insulin lispro, 0-5 Units, subcutaneous, TID  metoprolol succinate XL, 100 mg, oral, Daily  perflutren lipid microspheres, 0.5-10 mL of dilution, intravenous, Once in imaging  perflutren protein A microsphere, 0.5 mL, intravenous, Once in imaging  polyethylene glycol, 17 g, oral, Daily  sulfur hexafluoride microsphr, 2 mL, intravenous, Once in imaging      Continuous medications     PRN medications  PRN medications: dextrose, dextrose, glucagon, glucagon, hydrALAZINE **FOLLOWED BY** [START ON 6/14/2024] hydrALAZINE, labetaloL, oxygen  MR brain wo IV contrast    Result Date: 6/12/2024  Interpreted By:  Héctor Deluca, STUDY: MR BRAIN WO IV CONTRAST; MR ANGIO NECK WO IV CONTRAST; MR ANGIO HEAD WO IV CONTRAST;  6/12/2024 11:08 pm   INDICATION: Signs/Symptoms:cva on ct.  Stroke protocol.   COMPARISON: Correlation made to noncontrast head CT of " 06/12/2024.   ACCESSION NUMBER(S): [id number]; [id number]; [id number]   ORDERING CLINICIAN: ROBERT VINCENT   TECHNIQUE: Axial T2, FLAIR, DWI, gradient echo T2 and  sagittal and coronal T1 weighted images of brain were acquired.   Time-of-flight MRA of the head  and neck was performed. The images were reviewed as source images and maximum intensity projections.   FINDINGS: Brain:   Multiple sequences are limited by motion artifact.   CSF Spaces: The ventricles, sulci and basal cisterns are within normal limits.   Parenchyma: Small focus of reduced diffusion in the posterior left cerebellar hemisphere (series [id number], images 104 and 108) is compatible with acute to subacute lacunar infarcts. No other reduced diffusion to suggest recent ischemia. Cystic space without restricted diffusion corresponding to left thalamic hypodensity seen on CT compatible with chronic lacunar infarct. Chronic lacunar infarct in the right thalamus redemonstrated. Advanced diffuse volume loss. Confluent white matter T2/FLAIR hyperintense signal abnormality in the supratentorial white matter and patchy signal abnormality in the matthew and cerebellar hemispheres, suggesting advanced chronic microvascular ischemic change. Small foci of susceptibility artifact in the left thalamus and right aspect of the cerebellar vermis suggests hemosiderin staining related to chronic microhemorrhage or nonspecific mineralization. No evidence of recent intracranial hemorrhage. There is no mass effect or midline shift.   Paranasal Sinuses and Mastoids: Visualized paranasal sinuses and mastoid air cells are unremarkable.   MRA of head:   Anterior circulation:    There is expected flow signal in bilateral intracranial internal carotid arteries, bilateral carotid terminals, bilateral proximal anterior and middle cerebral arteries.   Posterior circulation:    Bilateral intracranial vertebral arteries, vertebrobasilar junction, basilar artery demonstrate expected  flow signal. PCAs are not well evaluated.   MRA of neck:   Limited by motion artifact.   Right carotid vessels:  There is expected flow signal in the visualized portion of the common carotid artery.  There is mild attenuation of flow signal at the carotid bifurcation which may be secondary to flow related artifact. The internal carotid artery in the neck demonstrates expected flow signal.  There is % stenosis  .   Left carotid vessels:   There is expected flow signal in the visualized portion of the common carotid artery.  There is mild attenuation of flow signal at the carotid bifurcation which may be secondary to flow related artifact. The internal carotid artery in the neck demonstrates expected flow signal.  There is % stenosis  .   Vertebral vessels:   The visualized segments of the cervical vertebral arteries demonstrate expected flow signal.       MRI Brain:   Small focus of reduced diffusion in the posterior left cerebellar hemisphere (series [id number], images 104 and 108) is compatible with acute to subacute lacunar infarcts.   No other reduced diffusion to suggest recent ischemia. Cystic space without restricted diffusion corresponding to left thalamic hypodensity seen on CT compatible with chronic lacunar infarct. Chronic lacunar infarct in the right thalamus redemonstrated.   Background of brain parenchymal volume loss and advanced chronic microvascular ischemic changes.   MRA:   No definite evidence of major vessel cutoff or significant stenosis on MRA head and neck. Please note that PCAs are not well evaluated.   MACRO: None   Signed by: Héctor Deluca 6/12/2024 11:38 PM Dictation workstation:   [id number]    MR angio head wo IV contrast    Result Date: 6/12/2024  Interpreted By:  Héctor Deluca, STUDY: MR BRAIN WO IV CONTRAST; MR ANGIO NECK WO IV CONTRAST; MR ANGIO HEAD WO IV CONTRAST;  6/12/2024 11:08 pm   INDICATION: Signs/Symptoms:cva on ct.  Stroke protocol.   COMPARISON: Correlation made to noncontrast  head CT of 06/12/2024.   ACCESSION NUMBER(S): [id number]; [id number]; [id number]   ORDERING CLINICIAN: ROBERT VINCENT   TECHNIQUE: Axial T2, FLAIR, DWI, gradient echo T2 and  sagittal and coronal T1 weighted images of brain were acquired.   Time-of-flight MRA of the head  and neck was performed. The images were reviewed as source images and maximum intensity projections.   FINDINGS: Brain:   Multiple sequences are limited by motion artifact.   CSF Spaces: The ventricles, sulci and basal cisterns are within normal limits.   Parenchyma: Small focus of reduced diffusion in the posterior left cerebellar hemisphere (series [id number], images 104 and 108) is compatible with acute to subacute lacunar infarcts. No other reduced diffusion to suggest recent ischemia. Cystic space without restricted diffusion corresponding to left thalamic hypodensity seen on CT compatible with chronic lacunar infarct. Chronic lacunar infarct in the right thalamus redemonstrated. Advanced diffuse volume loss. Confluent white matter T2/FLAIR hyperintense signal abnormality in the supratentorial white matter and patchy signal abnormality in the matthew and cerebellar hemispheres, suggesting advanced chronic microvascular ischemic change. Small foci of susceptibility artifact in the left thalamus and right aspect of the cerebellar vermis suggests hemosiderin staining related to chronic microhemorrhage or nonspecific mineralization. No evidence of recent intracranial hemorrhage. There is no mass effect or midline shift.   Paranasal Sinuses and Mastoids: Visualized paranasal sinuses and mastoid air cells are unremarkable.   MRA of head:   Anterior circulation:    There is expected flow signal in bilateral intracranial internal carotid arteries, bilateral carotid terminals, bilateral proximal anterior and middle cerebral arteries.   Posterior circulation:    Bilateral intracranial vertebral arteries, vertebrobasilar junction, basilar artery demonstrate  expected flow signal. PCAs are not well evaluated.   MRA of neck:   Limited by motion artifact.   Right carotid vessels:  There is expected flow signal in the visualized portion of the common carotid artery.  There is mild attenuation of flow signal at the carotid bifurcation which may be secondary to flow related artifact. The internal carotid artery in the neck demonstrates expected flow signal.  There is % stenosis  .   Left carotid vessels:   There is expected flow signal in the visualized portion of the common carotid artery.  There is mild attenuation of flow signal at the carotid bifurcation which may be secondary to flow related artifact. The internal carotid artery in the neck demonstrates expected flow signal.  There is % stenosis  .   Vertebral vessels:   The visualized segments of the cervical vertebral arteries demonstrate expected flow signal.       MRI Brain:   Small focus of reduced diffusion in the posterior left cerebellar hemisphere (series [id number], images 104 and 108) is compatible with acute to subacute lacunar infarcts.   No other reduced diffusion to suggest recent ischemia. Cystic space without restricted diffusion corresponding to left thalamic hypodensity seen on CT compatible with chronic lacunar infarct. Chronic lacunar infarct in the right thalamus redemonstrated.   Background of brain parenchymal volume loss and advanced chronic microvascular ischemic changes.   MRA:   No definite evidence of major vessel cutoff or significant stenosis on MRA head and neck. Please note that PCAs are not well evaluated.   MACRO: None   Signed by: Héctor Deluca 6/12/2024 11:38 PM Dictation workstation:   [id number]    MR angio neck wo IV contrast    Result Date: 6/12/2024  Interpreted By:  Héctor Deluca, STUDY: MR BRAIN WO IV CONTRAST; MR ANGIO NECK WO IV CONTRAST; MR ANGIO HEAD WO IV CONTRAST;  6/12/2024 11:08 pm   INDICATION: Signs/Symptoms:cva on ct.  Stroke protocol.   COMPARISON: Correlation made to  noncontrast head CT of 06/12/2024.   ACCESSION NUMBER(S): [id number]; [id number]; [id number]   ORDERING CLINICIAN: ROBERT VINCENT   TECHNIQUE: Axial T2, FLAIR, DWI, gradient echo T2 and  sagittal and coronal T1 weighted images of brain were acquired.   Time-of-flight MRA of the head  and neck was performed. The images were reviewed as source images and maximum intensity projections.   FINDINGS: Brain:   Multiple sequences are limited by motion artifact.   CSF Spaces: The ventricles, sulci and basal cisterns are within normal limits.   Parenchyma: Small focus of reduced diffusion in the posterior left cerebellar hemisphere (series [id number], images 104 and 108) is compatible with acute to subacute lacunar infarcts. No other reduced diffusion to suggest recent ischemia. Cystic space without restricted diffusion corresponding to left thalamic hypodensity seen on CT compatible with chronic lacunar infarct. Chronic lacunar infarct in the right thalamus redemonstrated. Advanced diffuse volume loss. Confluent white matter T2/FLAIR hyperintense signal abnormality in the supratentorial white matter and patchy signal abnormality in the matthew and cerebellar hemispheres, suggesting advanced chronic microvascular ischemic change. Small foci of susceptibility artifact in the left thalamus and right aspect of the cerebellar vermis suggests hemosiderin staining related to chronic microhemorrhage or nonspecific mineralization. No evidence of recent intracranial hemorrhage. There is no mass effect or midline shift.   Paranasal Sinuses and Mastoids: Visualized paranasal sinuses and mastoid air cells are unremarkable.   MRA of head:   Anterior circulation:    There is expected flow signal in bilateral intracranial internal carotid arteries, bilateral carotid terminals, bilateral proximal anterior and middle cerebral arteries.   Posterior circulation:    Bilateral intracranial vertebral arteries, vertebrobasilar junction, basilar artery  demonstrate expected flow signal. PCAs are not well evaluated.   MRA of neck:   Limited by motion artifact.   Right carotid vessels:  There is expected flow signal in the visualized portion of the common carotid artery.  There is mild attenuation of flow signal at the carotid bifurcation which may be secondary to flow related artifact. The internal carotid artery in the neck demonstrates expected flow signal.  There is % stenosis  .   Left carotid vessels:   There is expected flow signal in the visualized portion of the common carotid artery.  There is mild attenuation of flow signal at the carotid bifurcation which may be secondary to flow related artifact. The internal carotid artery in the neck demonstrates expected flow signal.  There is % stenosis  .   Vertebral vessels:   The visualized segments of the cervical vertebral arteries demonstrate expected flow signal.       MRI Brain:   Small focus of reduced diffusion in the posterior left cerebellar hemisphere (series [id number], images 104 and 108) is compatible with acute to subacute lacunar infarcts.   No other reduced diffusion to suggest recent ischemia. Cystic space without restricted diffusion corresponding to left thalamic hypodensity seen on CT compatible with chronic lacunar infarct. Chronic lacunar infarct in the right thalamus redemonstrated.   Background of brain parenchymal volume loss and advanced chronic microvascular ischemic changes.   MRA:   No definite evidence of major vessel cutoff or significant stenosis on MRA head and neck. Please note that PCAs are not well evaluated.   MACRO: None   Signed by: Héctor Deluca 6/12/2024 11:38 PM Dictation workstation:   [id number]    BI mammo bilateral screening tomosynthesis    Result Date: 6/12/2024  Interpreted By:  Fausto Chong, STUDY: BI MAMMO BILATERAL SCREENING TOMOSYNTHESIS;  6/12/2024 9:57 am   ACCESSION NUMBER(S): [id number]   ORDERING CLINICIAN: RANDI BLANCAS   INDICATION: Screening. Patient  with history of left invasive ductal carcinoma diagnosed in 2015 status post lumpectomy and 1/3 involved lymph node on sentinel lymph node biopsy status post chemotherapy on endocrine therapy.   COMPARISON: 06/07/2023 and all relevant prior breast imaging exams available at the time of dictation.   FINDINGS: 2D and tomosynthesis images were reviewed at 1 mm slice thickness.   Density:  The breast tissue is heterogeneously dense, which may obscure small masses.   Postsurgical changes of lumpectomy are partially visualized in the upper-outer left breast at posterior depth. The lumpectomy scar is partially visualized on the CC view due to posterior location.   Indeterminate calcifications are seen in superior central left breast at anterior depth. No suspicious masses or calcifications are identified in the right breast.       1. Indeterminate left breast calcifications. Additional evaluation is recommended with diagnostic mammogram with magnification views.   2. No mammographic evidence of malignancy in the right breast.   Of note, a same-day diagnostic evaluation could not be performed due to patient's dizziness which required assessment at the emergency department.   BI-RADS CATEGORY:   BI-RADS Category:  0 Incomplete; Need Additional Imaging Evaluation and/or Prior Mammograms for Comparison. Recommendation:  Additional Imaging. Recommended Date:  Immediate. Laterality:  Left.   For any future breast imaging appointments, please call [phone]).   MACRO: None   Signed by: Fausto Chong 6/12/2024 5:23 PM Dictation workstation:   [id number]    CT chest abdomen pelvis wo IV contrast    Result Date: 6/12/2024  STUDY: CT Chest, Abdomen, and Pelvis without IV Contrast; 6/12/2024 3:43 PM INDICATION: Unexplained weight loss.  Additional History:  Cancer. COMPARISON: 2/22/2023 US renal; 11/12/2021 CT chest. ACCESSION NUMBER(S): [id number] ORDERING CLINICIAN: DENISE CLAROS TECHNIQUE: CT of the  chest, abdomen, and pelvis was performed.  Contiguous axial images were obtained at 3 mm slice thickness through the chest, abdomen, and pelvis.  Coronal and sagittal reconstructions at 3 mm slice thickness were performed.  No intravenous contrast was administered.  FINDINGS: Please note that the evaluation of vessels, lymph nodes and organs is limited without intravenous contrast. CHEST: MEDIASTINUM: The heart is normal in size without pericardial effusion.  Coronary artery calcifications are identified.  Ascending aorta measures 4.0 cm and the descending aorta measures 3.0 cm. LUNGS/PLEURA: There is no pleural effusion, pleural thickening, or pneumothorax. The airways are patent. Lungs are clear without consolidation, interstitial disease, or suspicious nodules. LYMPH NODES: Thoracic lymph nodes are not enlarged.  There is prominence of thyroid gland with thyroid nodules measuring up to 2.1 x 1.6 cm in the right thyroid gland. ABDOMEN:  LIVER: No hepatomegaly.  Smooth surface contour.  Normal attenuation.  BILE DUCTS: There is mild intrahepatic biliary ductal dilatation.  The common bile duct measures 9 mm.  GALLBLADDER: The gallbladder is absent. STOMACH: No abnormalities identified.  PANCREAS: There is fullness of the pancreatic head.  Underlying mass cannot be excluded.  SPLEEN: No splenomegaly or focal splenic lesion.  ADRENAL GLANDS: No thickening or nodules.  KIDNEYS AND URETERS: Kidneys are normal in size and location.  No renal or ureteral calculi.  Multiple bilateral renal cysts are demonstrated and small bilateral knob strict renal calculi.  Complex cyst at the anterior mid pole left kidney is redemonstrated.  PELVIS:  BLADDER: No abnormalities identified.  REPRODUCTIVE ORGANS: There is fibroid prominent uterus. BOWEL: Mild wall thickening in the distal colon/rectum..  There is moderate colonic stool.  The appendix is normal.  VESSELS: No abnormalities identified.  Abdominal aorta is normal in caliber.   PERITONEUM/RETROPERITONEUM/LYMPH NODES: No free fluid.  No pneumoperitoneum. No lymphadenopathy.  ABDOMINAL WALL: No abnormalities identified. SOFT TISSUES: No abnormalities identified.  BONES: No acute fracture or aggressive osseous lesion.  There is disc space narrowing and endplate degenerative changes in the lumbar spine. There is minimal anterolisthesis of L4 on L5 and L5 on S1 with facet arthrosis.    Chest: No acute cardiopulmonary disease.  Ascending aortic aneurysm measuring 4.0 cm. Bilateral thyroid nodules. ABDOMEN: Intrahepatic and extrahepatic biliary ductal dilatation. Fullness of the pancreatic head.  Underlying mass cannot be excluded. Bilateral nonspecific renal calculi with multiple bilateral renal cysts.  Complex cyst midpole of the anterior left kidney is demonstrated.  This appears similar prior ultrasound.  Findings would better evaluated with contrast. Pelvis: Wall thickening within the distal colon/rectum.  Please correlate for colitis/proctitis.  Fibroid uterus.  No other acute inflammatory changes.  Moderate colonic stool. Signed by Layton Morales DO    CT head wo IV contrast    Result Date: 6/12/2024  Interpreted By:  Schoenberger, Joseph, STUDY: CT HEAD WO IV CONTRAST;  6/12/2024 3:42 pm   INDICATION: Signs/Symptoms:dizzy, rue weakness, history of breast ca.   COMPARISON: 11/12/2021   ACCESSION NUMBER(S): [id number]   ORDERING CLINICIAN: DENISE CLAROS   TECHNIQUE: Noncontrast axial CT scan of head was performed. Angled reformats in brain and bone windows were generated. The images were reviewed in bone, brain, blood and soft tissue windows.   FINDINGS: CSF Spaces: Enlarged due to parenchymal volume loss. Normal configuration with intact basal cisterns. There is no extraaxial fluid collection.   Parenchyma: There are rather extensive confluent areas of deep white matter hypoattenuation which appears similar to the prior exam. Focal area of hypoattenuation in the right  thalamus appear similar to the prior. In the interval since the prior exam there is new ill-defined area of hypoattenuation in the left thalamus. At the posterior aspect of there is a tiny high attenuation focus. This is new from the prior CT thalamic infarct with a tiny punctate hemorrhages consideration. MRI correlation could be helpful. 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   Calvarium: The calvarium is unremarkable.   Paranasal sinuses and mastoids: Visualized paranasal sinuses and mastoids are clear.       Age-indeterminate but possibly subacute lacunar infarct developing in the left thalamus. At the posterior aspect of this a tiny focus of hypoattenuation could represent a small component of hemorrhage. Close follow-up recommended. If warranted, MRI could be helpful to further evaluate.   Remote right thalamic infarct is unchanged.   Mild atrophy. Considerable chronic small vessel ischemic white matter demyelination unchanged from prior.     MACRO: Joseph Schoenberger discussed the significance and urgency of this critical finding by telephone with  DENISE CLAROS on 6/12/2024 at 4:03 pm.  (**-RCF-**) Findings:  See findings.     Signed by: Joseph Schoenberger 6/12/2024 4:07 PM Dictation workstation:   [id number]    ECG 12 lead    Result Date: 6/12/2024  Normal sinus rhythm Possible Left atrial enlargement Left axis deviation Anterior infarct , age undetermined ST & T wave abnormality, consider lateral ischemia Abnormal ECG When compared with ECG of 12-NOV-2021 10:50, T wave inversion now evident in Inferior leads Inverted T waves have replaced nonspecific T wave abnormality in Lateral leads QT has lengthened    XR DEXA bone density    Result Date: 5/28/2024  Interpreted By:  Ronald West, STUDY: DEXA BONE DENSITY5/28/2024 10:08 am   INDICATION: Signs/Symptoms:Hx of osteoporosis. Hx of primary hyperparathyroidism. Please include forearm..   The patient is a 72 y/o  year old F.   COMPARISON: Most recent prior is from 04/19/2022..   ACCESSION NUMBER(S): [id number]   ORDERING CLINICIAN: AUBREY ROSARIO   TECHNIQUE: DEXA BONE DENSITY   FINDINGS: SPINE L1-L4 Bone Mineral Density: 1.194 T-Score 0.1  Z-Score 1.2 Bone Mineral Density change vs baseline:  -0.4% Bone Mineral Density change vs previous: -1.1%   LEFT FEMUR -TOTAL Bone Mineral Density: 0.736 T-Score -2.2   Z-Score  -1.4 Bone Mineral Density change vs baseline: -22.0% Bone Mineral Density change vs previous: -6.8%   LEFT FEMUR -NECK Bone Mineral Density: 0.761 T-Score -2.0  Z-Score -1.0 Bone Mineral Density change vs baseline: -10.2% Bone Mineral Density change vs previous: -4.0%   LEFT FOREARM   1/3 Bone Mineral Density: 0.582 T-Score -3.4   Z-Score -1.8 Bone Mineral Density change vs baseline:  -8.3% Bone Mineral Density change vs previous:  Negative a 0.9%           World Health Organization (WHO) criteria for post-menopausal,  Women: Normal:         T-score at or above -1 SD Osteopenia:   T-score between -1 and -2.5 SD Osteoporosis: T-score at or below -2.5 SD     10-year Fracture Risk: Major Osteoporotic Fracture  5.0 Hip Fracture                        1.4   Note:  If no FRAX score is reported, it is because: Some T-score for Spine Total or Hip Total or Femoral Neck at or below -2.5   This exam was performed at [facility] on a A Fourth Act Dexa Unit.       DEXA:  According to World Health Organization criteria, classification is osteoporosis.   Followup recommended in two years or sooner as clinically warranted.   All images and detailed analysis are available on the  Radiology PACS.   MACRO: None   Signed by: Ronald West 5/28/2024 11:03 AM Dictation workstation:   [id number]   Results for orders placed or performed during the hospital encounter of 06/12/24 (from the past 24 hour(s))   Coagulation Screen   Result Value Ref Range    Protime 13.3 (H) 9.8  - 12.8 seconds    INR 1.2 (H) 0.9 - 1.1    aPTT 29 27 - 38 seconds   POCT GLUCOSE   Result Value Ref Range    POCT Glucose 225 (H) 74 - 99 mg/dL   Urinalysis with Reflex Culture and Microscopic   Result Value Ref Range    Color, Urine Light-Yellow Light-Yellow, Yellow, Dark-Yellow    Appearance, Urine Clear Clear    Specific Gravity, Urine 1.019 1.005 - 1.035    pH, Urine 6.5 5.0, 5.5, 6.0, 6.5, 7.0, 7.5, 8.0    Protein, Urine 200 (2+) (A) NEGATIVE, 10 (TRACE), 20 (TRACE) mg/dL    Glucose, Urine OVER (4+) (A) Normal mg/dL    Blood, Urine NEGATIVE NEGATIVE    Ketones, Urine NEGATIVE NEGATIVE mg/dL    Bilirubin, Urine NEGATIVE NEGATIVE    Urobilinogen, Urine Normal Normal mg/dL    Nitrite, Urine NEGATIVE NEGATIVE    Leukocyte Esterase, Urine NEGATIVE NEGATIVE   Urinalysis Microscopic   Result Value Ref Range    WBC, Urine 1-5 1-5, NONE /HPF    RBC, Urine NONE NONE, 1-2, 3-5 /HPF    Squamous Epithelial Cells, Urine 1-9 (SPARSE) Reference range not established. /HPF   POCT GLUCOSE   Result Value Ref Range    POCT Glucose 288 (H) 74 - 99 mg/dL   CBC and Auto Differential   Result Value Ref Range    WBC 6.4 4.4 - 11.3 x10*3/uL    nRBC 0.0 0.0 - 0.0 /100 WBCs    RBC 5.52 (H) 4.00 - 5.20 x10*6/uL    Hemoglobin 16.1 (H) 12.0 - 16.0 g/dL    Hematocrit 49.1 (H) 36.0 - 46.0 %    MCV 89 80 - 100 fL    MCH 29.2 26.0 - 34.0 pg    MCHC 32.8 32.0 - 36.0 g/dL    RDW 13.5 11.5 - 14.5 %    Platelets 258 150 - 450 x10*3/uL    Neutrophils % 68.4 40.0 - 80.0 %    Immature Granulocytes %, Automated 0.3 0.0 - 0.9 %    Lymphocytes % 23.0 13.0 - 44.0 %    Monocytes % 6.9 2.0 - 10.0 %    Eosinophils % 1.1 0.0 - 6.0 %    Basophils % 0.3 0.0 - 2.0 %    Neutrophils Absolute 4.36 1.60 - 5.50 x10*3/uL    Immature Granulocytes Absolute, Automated 0.02 0.00 - 0.50 x10*3/uL    Lymphocytes Absolute 1.47 0.80 - 3.00 x10*3/uL    Monocytes Absolute 0.44 0.05 - 0.80 x10*3/uL    Eosinophils Absolute 0.07 0.00 - 0.40 x10*3/uL    Basophils Absolute 0.02 0.00  - 0.10 x10*3/uL   Basic Metabolic Panel   Result Value Ref Range    Glucose 260 (H) 74 - 99 mg/dL    Sodium 143 136 - 145 mmol/L    Potassium 3.8 3.5 - 5.3 mmol/L    Chloride 108 (H) 98 - 107 mmol/L    Bicarbonate 23 21 - 32 mmol/L    Anion Gap 16 10 - 20 mmol/L    Urea Nitrogen 36 (H) 6 - 23 mg/dL    Creatinine 1.55 (H) 0.50 - 1.05 mg/dL    eGFR 35 (L) >60 mL/min/1.73m*2    Calcium 10.1 8.6 - 10.3 mg/dL   POCT GLUCOSE   Result Value Ref Range    POCT Glucose 236 (H) 74 - 99 mg/dL   POCT GLUCOSE   Result Value Ref Range    POCT Glucose 219 (H) 74 - 99 mg/dL   POCT GLUCOSE   Result Value Ref Range    POCT Glucose 183 (H) 74 - 99 mg/dL          Assessment/Plan     73 y.o. female with history of CVA, diabetes, hypertension, breast cancer, CKD, presented with complaints of dizziness, right upper extremity weakness, found with subacute CVA.  GI consulted for weight loss, abnormal CT scan showing intrahepatic and extrahepatic biliary dilation, fullness of the head of the pancreas.    -Plan for MRCP for better visualization of the pancreas  -will check   -further recommendations pending MRCP results    I spent 35 minutes in the professional and overall care of this patient.      Hannah Ingram, APRN-CNP

## 2024-06-13 NOTE — PROGRESS NOTES
Physical Therapy    Physical Therapy Evaluation & Treatment    Patient Name: Saige Yates  MRN: [id number]  Today's Date: 6/13/2024   Time Calculation  Start Time: 1209  Stop Time: 1232  Time Calculation (min): 23 min    Assessment/Plan   PT Assessment  PT Assessment Results: Decreased mobility  Rehab Prognosis: Good  Barriers to Discharge: none  Evaluation/Treatment Tolerance: Patient tolerated treatment well  Medical Staff Made Aware: Yes  Strengths: Support of Caregivers, Premorbid level of function  Barriers to Participation: Comorbidities  End of Session Communication: Bedside nurse  Assessment Comment: Pt presenting with impaired mobility and would benefit from low intensity PT to maximize functional mobility, safety and independence.  End of Session Patient Position:  (on commode chair with call bell in reach and RN made aware.)   IP OR SWING BED PT PLAN  Inpatient or Swing Bed: Inpatient  PT Plan  Treatment/Interventions: Bed mobility, Transfer training, Gait training, Stair training, Balance training, Endurance training, Therapeutic exercise, Therapeutic activity, Home exercise program  PT Plan: Ongoing PT  PT Frequency: 3 times per week  PT Discharge Recommendations: Low intensity level of continued care  PT Recommended Transfer Status: Contact guard  PT - OK to Discharge: Yes (Per POC)      Subjective     General Visit Information:  General  Reason for Referral: 73 year old female admitted with dizziness and confusion.  Referred By: Aga KING)  Past Medical History Relevant to Rehab: DM2, restrictive lung disease, gout, HLD, polycythemia  Family/Caregiver Present: No  Prior to Session Communication: Bedside nurse  Patient Position Received: Bed, 2 rail up, Alarm off, not on at start of session  Preferred Learning Style: auditory, verbal  General Comment: Pt pleasant and agreeable to PT. Pt states she has a history of 3 falls in the past 3 months. Falls generally occure at night when she cant see.  Home  Living:  Home Living  Type of Home: House  Lives With: Other (Comment) (3 adult children)  Home Adaptive Equipment: Walker rolling or standard  Home Layout: One level  Home Access: Level entry  Bathroom Shower/Tub: Walk-in shower  Bathroom Toilet: Handicapped height  Bathroom Equipment: Grab bars around toilet, Grab bars in shower  Prior Level of Function:  Prior Function Per Pt/Caregiver Report  Level of [place]: Independent with ADLs and functional transfers, Needs assistance with homemaking  Receives Help From: Family  ADL Assistance: Independent  Homemaking Assistance: Needs assistance  Ambulatory Assistance: Independent  Prior Function Comments: Pt states she is independent with transfering and ambulation without at device. She can dress and bathe herself but her son helps with the cooking, cleaning and laundry.  Precautions:  Precautions  Medical Precautions: Fall precautions  Vital Signs:       Objective   Pain:  Pain Assessment  Pain Assessment: 0-10  Pain Score: 0 - No pain  Cognition:  Cognition  Overall Cognitive Status: Within Functional Limits  Orientation Level: Oriented X4  Insight: Within function limits  Impulsive: Within functional limits    General Assessments:  General Observation  General Observation: No apparent distress               Activity Tolerance  Endurance: Tolerates 10 - 20 min exercise with multiple rests    Sensation  Light Touch: No apparent deficits    Strength  Strength Comments: Bilat LE strength grossly WFLs  Strength  Strength Comments: Bilat LE strength grossly WFLs    Perception  Inattention/Neglect: Appears intact      Coordination  Movements are Fluid and Coordinated: Yes    Postural Control  Postural Control: Within Functional Limits    Static Sitting Balance  Static Sitting-Balance Support: Feet supported  Static Sitting-Level of Assistance: Modified independent       Functional Assessments:       Bed Mobility  Bed Mobility: Yes  Bed Mobility 1  Bed Mobility 1:  Supine to sitting  Level of Assistance 1: Independent         Ambulation/Gait Training  Ambulation/Gait Training Performed: Yes  Ambulation/Gait Training 1  Surface 1: Level tile  Device 1: Rolling walker  Assistance 1: Contact guard  Quality of Gait 1: Decreased step length  Comments/Distance (ft) 1: Pt ambulated 60 feet. Tolerated well.                 Extremity/Trunk Assessments:  RLE   RLE : Within Functional Limits  LLE   LLE : Within Functional Limits  Treatments:                                Bed Mobility  Bed Mobility: Yes  Bed Mobility 1  Bed Mobility 1: Supine to sitting  Level of Assistance 1: Independent    Ambulation/Gait Training  Ambulation/Gait Training Performed: Yes  Ambulation/Gait Training 1  Surface 1: Level tile  Device 1: Rolling walker  Assistance 1: Contact guard  Quality of Gait 1: Decreased step length  Comments/Distance (ft) 1: Pt ambulated 60 feet. Tolerated well.                           Outcome Measures:  [facility] Basic Mobility  Turning from your back to your side while in a flat bed without using bedrails: None  Moving from lying on your back to sitting on the side of a flat bed without using bedrails: None  Moving to and from bed to chair (including a wheelchair): A little  Standing up from a chair using your arms (e.g. wheelchair or bedside chair): A little  To walk in hospital room: A little  Climbing 3-5 steps with railing: A little  Basic Mobility - Total Score: 20    Encounter Problems       Encounter Problems (Active)       Mobility       STG - Patient will ambulate 150 feet independently with FWW       Start:  06/13/24    Expected End:  06/20/24               PT Transfers       STG - Transfer from bed to chair independently with FWW       Start:  06/13/24    Expected End:  06/20/24            STG - Patient will transfer sit to and from stand independently with FWW       Start:  06/13/24    Expected End:  06/20/24                   Education Documentation  Precautions, taught  by Mari Diggs, PT at 6/13/2024  1:00 PM.  Learner: Patient  Readiness: Acceptance  Method: Explanation, Demonstration  Response: Verbalizes Understanding, Needs Reinforcement    Home Exercise Program, taught by Mari Diggs, PT at 6/13/2024  1:00 PM.  Learner: Patient  Readiness: Acceptance  Method: Explanation, Demonstration  Response: Verbalizes Understanding, Needs Reinforcement    Mobility Training, taught by Mari Diggs, PT at 6/13/2024  1:00 PM.  Learner: Patient  Readiness: Acceptance  Method: Explanation, Demonstration  Response: Verbalizes Understanding, Needs Reinforcement    Education Comments  No comments found.

## 2024-06-13 NOTE — PROGRESS NOTES
Saige Yates is a 73 y.o. female on day 1 of admission presenting with Cerebrovascular accident (CVA), unspecified mechanism (Multi).      Subjective   Cruciated neurology consult patient does have a small acute left cerebellar infarct, started aspirin and statin, patient does need PT OT and echo done prior to discharge.  Will not treat blood pressure unless over 220/110.       Objective     Last Recorded Vitals  BP (!) 144/109   Pulse 78   Temp 36.8 °C (98.3 °F)   Resp 18   Wt 49.9 kg (110 lb)   SpO2 96%   Intake/Output last 3 Shifts:  No intake or output data in the 24 hours ending 06/13/24 1148    Admission Weight  Weight: 49.9 kg (110 lb) (06/12/24 1059)    Daily Weight  06/12/24 : 49.9 kg (110 lb)    Image Results  MR brain wo IV contrast, MR angio head wo IV contrast, MR angio neck wo IV contrast  Narrative: Interpreted By:  Héctor Deluca,   STUDY:  MR BRAIN WO IV CONTRAST; MR ANGIO NECK WO IV CONTRAST; MR ANGIO HEAD  WO IV CONTRAST;  6/12/2024 11:08 pm      INDICATION:  Signs/Symptoms:cva on ct.  Stroke protocol.      COMPARISON:  Correlation made to noncontrast head CT of 06/12/2024.      ACCESSION NUMBER(S):  [id number]; [id number]; [id number]      ORDERING CLINICIAN:  ROBERT VINCENT      TECHNIQUE:  Axial T2, FLAIR, DWI, gradient echo T2 and  sagittal and coronal T1  weighted images of brain were acquired.      Time-of-flight MRA of the head  and neck was performed. The images  were reviewed as source images and maximum intensity projections.      FINDINGS:  Brain:      Multiple sequences are limited by motion artifact.      CSF Spaces: The ventricles, sulci and basal cisterns are within  normal limits.      Parenchyma: Small focus of reduced diffusion in the posterior left  cerebellar hemisphere (series [id number], images 104 and 108) is  compatible with acute to subacute lacunar infarcts. No other reduced  diffusion to suggest recent ischemia. Cystic space without restricted  diffusion  corresponding to left thalamic hypodensity seen on CT  compatible with chronic lacunar infarct. Chronic lacunar infarct in  the right thalamus redemonstrated. Advanced diffuse volume loss.  Confluent white matter T2/FLAIR hyperintense signal abnormality in  the supratentorial white matter and patchy signal abnormality in the  matthew and cerebellar hemispheres, suggesting advanced chronic  microvascular ischemic change. Small foci of susceptibility artifact  in the left thalamus and right aspect of the cerebellar vermis  suggests hemosiderin staining related to chronic microhemorrhage or  nonspecific mineralization. No evidence of recent intracranial  hemorrhage. There is no mass effect or midline shift.      Paranasal Sinuses and Mastoids: Visualized paranasal sinuses and  mastoid air cells are unremarkable.      MRA of head:      Anterior circulation:    There is expected flow signal in bilateral  intracranial internal carotid arteries, bilateral carotid terminals,  bilateral proximal anterior and middle cerebral arteries.      Posterior circulation:    Bilateral intracranial vertebral arteries,  vertebrobasilar junction, basilar artery demonstrate expected flow  signal. PCAs are not well evaluated.      MRA of neck:      Limited by motion artifact.      Right carotid vessels:  There is expected flow signal in the  visualized portion of the common carotid artery.  There is mild  attenuation of flow signal at the carotid bifurcation which may be  secondary to flow related artifact. The internal carotid artery in  the neck demonstrates expected flow signal.  There is % stenosis  .      Left carotid vessels:   There is expected flow signal in the  visualized portion of the common carotid artery.  There is mild  attenuation of flow signal at the carotid bifurcation which may be  secondary to flow related artifact. The internal carotid artery in  the neck demonstrates expected flow signal.  There is % stenosis  .       Vertebral vessels:   The visualized segments of the cervical  vertebral arteries demonstrate expected flow signal.      Impression: MRI Brain:      Small focus of reduced diffusion in the posterior left cerebellar  hemisphere (series [id number], images 104 and 108) is compatible with  acute to subacute lacunar infarcts.      No other reduced diffusion to suggest recent ischemia. Cystic space  without restricted diffusion corresponding to left thalamic  hypodensity seen on CT compatible with chronic lacunar infarct.  Chronic lacunar infarct in the right thalamus redemonstrated.      Background of brain parenchymal volume loss and advanced chronic  microvascular ischemic changes.      MRA:      No definite evidence of major vessel cutoff or significant stenosis  on MRA head and neck. Please note that PCAs are not well evaluated.      MACRO:  None      Signed by: Héctor Deluca 6/12/2024 11:38 PM  Dictation workstation:   [id number]  BI mammo bilateral screening tomosynthesis  Narrative: Interpreted By:  Fausto Chong,   STUDY:  BI MAMMO BILATERAL SCREENING TOMOSYNTHESIS;  6/12/2024 9:57 am      ACCESSION NUMBER(S):  [id number]      ORDERING CLINICIAN:  RANDI BLANCAS      INDICATION:  Screening. Patient with history of left invasive ductal carcinoma  diagnosed in 2015 status post lumpectomy and 1/3 involved lymph node  on sentinel lymph node biopsy status post chemotherapy on endocrine  therapy.      COMPARISON:  06/07/2023 and all relevant prior breast imaging exams available at  the time of dictation.      FINDINGS:  2D and tomosynthesis images were reviewed at 1 mm slice thickness.      Density:  The breast tissue is heterogeneously dense, which may  obscure small masses.      Postsurgical changes of lumpectomy are partially visualized in the  upper-outer left breast at posterior depth. The lumpectomy scar is  partially visualized on the CC view due to posterior location.      Indeterminate calcifications are  seen in superior central left breast  at anterior depth. No suspicious masses or calcifications are  identified in the right breast.      Impression: 1. Indeterminate left breast calcifications. Additional evaluation is  recommended with diagnostic mammogram with magnification views.      2. No mammographic evidence of malignancy in the right breast.      Of note, a same-day diagnostic evaluation could not be performed due  to patient's dizziness which required assessment at the emergency  department.      BI-RADS CATEGORY:      BI-RADS Category:  0 Incomplete; Need Additional Imaging Evaluation  and/or Prior Mammograms for Comparison. Recommendation:  Additional  Imaging. Recommended Date:  Immediate.  Laterality:  Left.      For any future breast imaging appointments, please call [phone]).      MACRO:  None      Signed by: Fausto Chong 6/12/2024 5:23 PM  Dictation workstation:   [id number]  CT chest abdomen pelvis wo IV contrast  Narrative: STUDY:  CT Chest, Abdomen, and Pelvis without IV Contrast; 6/12/2024 3:43 PM  INDICATION:  Unexplained weight loss.  Additional History:  Cancer.  COMPARISON:  2/22/2023 US renal; 11/12/2021 CT chest.  ACCESSION NUMBER(S):  [id number]  ORDERING CLINICIAN:  DENISE CLAROS  TECHNIQUE:  CT of the chest, abdomen, and pelvis was performed.  Contiguous axial  images were obtained at 3 mm slice thickness through the chest,  abdomen, and pelvis.  Coronal and sagittal reconstructions at 3 mm  slice thickness were performed.  No intravenous contrast was  administered.    FINDINGS:  Please note that the evaluation of vessels, lymph nodes and organs is  limited without intravenous contrast.   CHEST:  MEDIASTINUM:  The heart is normal in size without pericardial effusion.  Coronary  artery calcifications are identified.  Ascending aorta measures 4.0 cm  and the descending aorta measures 3.0 cm.  LUNGS/PLEURA:  There is no pleural effusion, pleural  thickening, or pneumothorax.   The airways are patent.  Lungs are clear without consolidation, interstitial disease, or  suspicious nodules.  LYMPH NODES:  Thoracic lymph nodes are not enlarged.  There is prominence of thyroid  gland with thyroid nodules measuring up to 2.1 x 1.6 cm in the right  thyroid gland.  ABDOMEN:     LIVER:  No hepatomegaly.  Smooth surface contour.  Normal attenuation.     BILE DUCTS:  There is mild intrahepatic biliary ductal dilatation.  The common bile  duct measures 9 mm.     GALLBLADDER:  The gallbladder is absent.  STOMACH:  No abnormalities identified.     PANCREAS:  There is fullness of the pancreatic head.  Underlying mass cannot be  excluded.     SPLEEN:  No splenomegaly or focal splenic lesion.     ADRENAL GLANDS:  No thickening or nodules.     KIDNEYS AND URETERS:  Kidneys are normal in size and location.  No renal or ureteral  calculi.  Multiple bilateral renal cysts are demonstrated and small  bilateral knob strict renal calculi.  Complex cyst at the anterior mid  pole left kidney is redemonstrated.     PELVIS:     BLADDER:  No abnormalities identified.     REPRODUCTIVE ORGANS:  There is fibroid prominent uterus.  BOWEL:  Mild wall thickening in the distal colon/rectum..  There is moderate  colonic stool.  The appendix is normal.     VESSELS:  No abnormalities identified.  Abdominal aorta is normal in caliber.      PERITONEUM/RETROPERITONEUM/LYMPH NODES:  No free fluid.  No pneumoperitoneum.  No lymphadenopathy.     ABDOMINAL WALL:  No abnormalities identified.  SOFT TISSUES:   No abnormalities identified.     BONES:  No acute fracture or aggressive osseous lesion.  There is disc space  narrowing and endplate degenerative changes in the lumbar spine.   There is minimal anterolisthesis of L4 on L5 and L5 on S1 with facet  arthrosis.  Impression: Chest: No acute cardiopulmonary disease.  Ascending aortic aneurysm  measuring 4.0 cm.  Bilateral thyroid nodules.  ABDOMEN:  Intrahepatic and extrahepatic biliary ductal dilatation.   Fullness of the pancreatic head.  Underlying mass cannot be excluded.  Bilateral nonspecific renal calculi with multiple bilateral renal  cysts.  Complex cyst midpole of the anterior left kidney is  demonstrated.  This appears similar prior ultrasound.  Findings would  better evaluated with contrast.  Pelvis: Wall thickening within the distal colon/rectum.  Please  correlate for colitis/proctitis.  Fibroid uterus.  No other acute  inflammatory changes.  Moderate colonic stool.  Signed by Layton Morales, DO  CT head wo IV contrast  Narrative: Interpreted By:  Schoenberger, Joseph,   STUDY:  CT HEAD WO IV CONTRAST;  6/12/2024 3:42 pm      INDICATION:  Signs/Symptoms:dizzy, rue weakness, history of breast ca.      COMPARISON:  11/12/2021      ACCESSION NUMBER(S):  [id number]      ORDERING CLINICIAN:  DENISE CLAROS      TECHNIQUE:  Noncontrast axial CT scan of head was performed. Angled reformats in  brain and bone windows were generated. The images were reviewed in  bone, brain, blood and soft tissue windows.      FINDINGS:  CSF Spaces: Enlarged due to parenchymal volume loss. Normal  configuration with intact basal cisterns. There is no extraaxial  fluid collection.      Parenchyma: There are rather extensive confluent areas of deep white  matter hypoattenuation which appears similar to the prior exam. Focal  area of hypoattenuation in the right thalamus appear similar to the  prior. In the interval since the prior exam there is new ill-defined  area of hypoattenuation in the left thalamus. At the posterior aspect  of there is a tiny high attenuation focus. This is new from the prior  CT thalamic infarct with a tiny punctate hemorrhages consideration.  MRI correlation could be helpful. 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      Calvarium: The calvarium is unremarkable.      Paranasal  sinuses and mastoids: Visualized paranasal sinuses and  mastoids are clear.      Impression: Age-indeterminate but possibly subacute lacunar infarct developing in  the left thalamus. At the posterior aspect of this a tiny focus of  hypoattenuation could represent a small component of hemorrhage.  Close follow-up recommended. If warranted, MRI could be helpful to  further evaluate.      Remote right thalamic infarct is unchanged.      Mild atrophy. Considerable chronic small vessel ischemic white matter  demyelination unchanged from prior.          MACRO:  Joseph Schoenberger discussed the significance and urgency of this  critical finding by telephone with  DENISE CLAROS on  6/12/2024 at 4:03 pm.  (**-RCF-**) Findings:  See findings.          Signed by: Joseph Schoenberger 6/12/2024 4:07 PM  Dictation workstation:   [id number]  ECG 12 lead  Normal sinus rhythm  Possible Left atrial enlargement  Left axis deviation  Anterior infarct , age undetermined  ST & T wave abnormality, consider lateral ischemia  Abnormal ECG  When compared with ECG of 12-NOV-2021 10:50,  T wave inversion now evident in Inferior leads  Inverted T waves have replaced nonspecific T wave abnormality in Lateral leads  QT has lengthened      Physical Exam  Vitals reviewed.   Constitutional:       Appearance: Normal appearance.   HENT:      Head: Normocephalic.      Nose: Nose normal.      Mouth/Throat:      Mouth: Mucous membranes are moist.   Cardiovascular:      Rate and Rhythm: Normal rate and regular rhythm.   Pulmonary:      Effort: Pulmonary effort is normal.   Abdominal:      General: Abdomen is flat. Bowel sounds are normal.      Palpations: Abdomen is soft.   Skin:     General: Skin is warm.      Capillary Refill: Capillary refill takes less than 2 seconds.   Neurological:      General: No focal deficit present.      Mental Status: She is alert.         Relevant Results  aspirin, 81 mg, oral, Daily  atorvastatin, 40 mg,  oral, Nightly  calcium carbonate, 1,250 mg, oral, BID  clopidogrel, 75 mg, oral, Daily  insulin lispro, 0-5 Units, subcutaneous, TID  metoprolol succinate XL, 100 mg, oral, Daily  perflutren lipid microspheres, 0.5-10 mL of dilution, intravenous, Once in imaging  perflutren protein A microsphere, 0.5 mL, intravenous, Once in imaging  polyethylene glycol, 17 g, oral, Daily  sulfur hexafluoride microsphr, 2 mL, intravenous, Once in imaging      Results for orders placed or performed during the hospital encounter of 06/12/24 (from the past 24 hour(s))   Troponin, High Sensitivity, 1 Hour   Result Value Ref Range    Troponin I, High Sensitivity 12 0 - 13 ng/L   Comprehensive metabolic panel   Result Value Ref Range    Glucose 285 (H) 74 - 99 mg/dL    Sodium 142 136 - 145 mmol/L    Potassium 3.9 3.5 - 5.3 mmol/L    Chloride 106 98 - 107 mmol/L    Bicarbonate 22 21 - 32 mmol/L    Anion Gap 18 10 - 20 mmol/L    Urea Nitrogen 37 (H) 6 - 23 mg/dL    Creatinine 1.56 (H) 0.50 - 1.05 mg/dL    eGFR 35 (L) >60 mL/min/1.73m*2    Calcium 10.5 (H) 8.6 - 10.3 mg/dL    Albumin 3.6 3.4 - 5.0 g/dL    Alkaline Phosphatase 107 33 - 136 U/L    Total Protein 8.0 6.4 - 8.2 g/dL    AST 18 9 - 39 U/L    Bilirubin, Total 1.1 0.0 - 1.2 mg/dL    ALT 19 7 - 45 U/L   Coagulation Screen   Result Value Ref Range    Protime 13.3 (H) 9.8 - 12.8 seconds    INR 1.2 (H) 0.9 - 1.1    aPTT 29 27 - 38 seconds   POCT GLUCOSE   Result Value Ref Range    POCT Glucose 225 (H) 74 - 99 mg/dL   Urinalysis with Reflex Culture and Microscopic   Result Value Ref Range    Color, Urine Light-Yellow Light-Yellow, Yellow, Dark-Yellow    Appearance, Urine Clear Clear    Specific Gravity, Urine 1.019 1.005 - 1.035    pH, Urine 6.5 5.0, 5.5, 6.0, 6.5, 7.0, 7.5, 8.0    Protein, Urine 200 (2+) (A) NEGATIVE, 10 (TRACE), 20 (TRACE) mg/dL    Glucose, Urine OVER (4+) (A) Normal mg/dL    Blood, Urine NEGATIVE NEGATIVE    Ketones, Urine NEGATIVE NEGATIVE mg/dL    Bilirubin, Urine  NEGATIVE NEGATIVE    Urobilinogen, Urine Normal Normal mg/dL    Nitrite, Urine NEGATIVE NEGATIVE    Leukocyte Esterase, Urine NEGATIVE NEGATIVE   Urinalysis Microscopic   Result Value Ref Range    WBC, Urine 1-5 1-5, NONE /HPF    RBC, Urine NONE NONE, 1-2, 3-5 /HPF    Squamous Epithelial Cells, Urine 1-9 (SPARSE) Reference range not established. /HPF   POCT GLUCOSE   Result Value Ref Range    POCT Glucose 288 (H) 74 - 99 mg/dL   CBC and Auto Differential   Result Value Ref Range    WBC 6.4 4.4 - 11.3 x10*3/uL    nRBC 0.0 0.0 - 0.0 /100 WBCs    RBC 5.52 (H) 4.00 - 5.20 x10*6/uL    Hemoglobin 16.1 (H) 12.0 - 16.0 g/dL    Hematocrit 49.1 (H) 36.0 - 46.0 %    MCV 89 80 - 100 fL    MCH 29.2 26.0 - 34.0 pg    MCHC 32.8 32.0 - 36.0 g/dL    RDW 13.5 11.5 - 14.5 %    Platelets 258 150 - 450 x10*3/uL    Neutrophils % 68.4 40.0 - 80.0 %    Immature Granulocytes %, Automated 0.3 0.0 - 0.9 %    Lymphocytes % 23.0 13.0 - 44.0 %    Monocytes % 6.9 2.0 - 10.0 %    Eosinophils % 1.1 0.0 - 6.0 %    Basophils % 0.3 0.0 - 2.0 %    Neutrophils Absolute 4.36 1.60 - 5.50 x10*3/uL    Immature Granulocytes Absolute, Automated 0.02 0.00 - 0.50 x10*3/uL    Lymphocytes Absolute 1.47 0.80 - 3.00 x10*3/uL    Monocytes Absolute 0.44 0.05 - 0.80 x10*3/uL    Eosinophils Absolute 0.07 0.00 - 0.40 x10*3/uL    Basophils Absolute 0.02 0.00 - 0.10 x10*3/uL   Basic Metabolic Panel   Result Value Ref Range    Glucose 260 (H) 74 - 99 mg/dL    Sodium 143 136 - 145 mmol/L    Potassium 3.8 3.5 - 5.3 mmol/L    Chloride 108 (H) 98 - 107 mmol/L    Bicarbonate 23 21 - 32 mmol/L    Anion Gap 16 10 - 20 mmol/L    Urea Nitrogen 36 (H) 6 - 23 mg/dL    Creatinine 1.55 (H) 0.50 - 1.05 mg/dL    eGFR 35 (L) >60 mL/min/1.73m*2    Calcium 10.1 8.6 - 10.3 mg/dL   POCT GLUCOSE   Result Value Ref Range    POCT Glucose 236 (H) 74 - 99 mg/dL         Assessment/Plan        Small, acute left cerebellar infarct  -Start the patient on aspirin and atorvastatin goal LDL is less than  170 will not treat blood pressure unless it is over systolics of 220.  Continue home medications.  Pending echo PT OT    2.  HTN  -permissive  -bp in acceptable range    3.DMII  -Continue sliding scale insulin  -Hemoglobin A1c    4.  Pancreatic head fullness  -Consult GI may need EUS, patient also has had a significant weight loss      Dispo: possibly in am  Sarah Yung MD

## 2024-06-13 NOTE — CONSULTS
"INITIAL NEUROLOGY CONSULT NOTE    IMPRESSION:  Small, acute left cerebellar infarct without major residua.    RECOMMENDATIONS:  ECASA 81 mg daily for stroke prophylaxis.  Atorvastatin for adjunct stroke prophylaxis with target LDL<70.  Awaiting echo result to rule out cardioembolic process.  If no actionable pathology on the echo, can consider discharge planning from the neurological standpoint.  Discussed with Dr. Yung in person.    Danis Hooks Jr., M.D., FAAN       History Of Present Illness  Saige Yates is a 73 y.o. female presenting with stroke.  History comes from the patient and from EMR review.    [place] [facility] [place] because of slower than usual, and \"dizziness\", by which she means unsteady gait, which had resolved by the time of arrival.  She was not a TNK candidate because of improvement of symptoms.  She currently denies other focal neurological symptoms including dysarthria, dysphagia, diplopia, focal weakness, focal sensory change, ataxia, vertigo, or bowel/bladder incontinence, among others.  She had stroke workup including cranial MRI, resulted below.    Past Medical History  Past Medical History:   Diagnosis Date    Abnormal findings on diagnostic imaging of other specified body structures 12/08/2018    Abnormal chest CT    Abnormal levels of other serum enzymes 01/14/2017    Elevated liver enzymes    Body mass index (BMI) 21.0-21.9, adult 11/24/2021    BMI 21.0-21.9, adult    Body mass index (BMI) 22.0-22.9, adult 06/06/2022    Body mass index (BMI) of 22.0 to 22.9 in adult    Body mass index (BMI) 23.0-23.9, adult 07/07/2021    BMI 23.0-23.9, adult    Body mass index (BMI) 24.0-24.9, adult 09/06/2019    BMI 24.0-24.9, adult    Encounter for therapeutic drug level monitoring 03/12/2018    Encounter for monitoring anastrozole therapy    Essential (primary) hypertension 08/24/2017    Uncontrolled hypertension    Other cerebral infarction due to occlusion or stenosis of " small artery (Multi) 04/08/2019    Lacunar infarction    Other conditions influencing health status 01/22/2018    Malignant neoplasm of left female breast, unspecified site of breast    Other specified disorders of breast 04/26/2016    Postoperative breast asymmetry    Personal history of diseases of the skin and subcutaneous tissue 11/23/2013    History of atopic dermatitis    Personal history of other specified conditions 11/07/2015    History of chest pain    Personal history of transient ischemic attack (TIA), and cerebral infarction without residual deficits 12/07/2019    History of transient cerebral ischemia    Unspecified lump in the left breast, unspecified quadrant 08/24/2017    Breast mass, left     Surgical History  Past Surgical History:   Procedure Laterality Date    BREAST BIOPSY  02/26/2015    Biopsy Breast Percutaneous Needle Core    BREAST LUMPECTOMY Left     COLONOSCOPY  05/20/2017    Complete Colonoscopy    GALLBLADDER SURGERY  02/09/2015    Gallbladder Surgery    MR HEAD ANGIO WO IV CONTRAST  11/12/2021    MR HEAD ANGIO WO IV CONTRAST 11/12/2021 [facility] CLINICAL LEGAC[facility]    OTHER SURGICAL HISTORY  04/23/2018    Parathyroid Resection    OTHER SURGICAL HISTORY  04/27/2015    Left Breast Partial Mastectomy    SENTINEL LYMPH NODE BIOPSY  04/27/2015    [place] Lymph Node Biopsy     Social History  Social History     Tobacco Use    Smoking status: Never    Smokeless tobacco: Never   Substance Use Topics    Alcohol use: Never    Drug use: Never       Scheduled medications  aspirin, 81 mg, oral, Daily  atorvastatin, 40 mg, oral, Nightly  calcium carbonate, 1,250 mg, oral, BID  clopidogrel, 75 mg, oral, Daily  insulin lispro, 0-5 Units, subcutaneous, TID  metoprolol succinate XL, 100 mg, oral, Daily  perflutren lipid microspheres, 0.5-10 mL of dilution, intravenous, Once in imaging  perflutren protein A microsphere, 0.5 mL, intravenous, Once in imaging  polyethylene glycol, 17 g, oral, Daily  sulfur  "hexafluoride microsphr, 2 mL, intravenous, Once in imaging      Continuous medications     PRN medications  PRN medications: dextrose, dextrose, glucagon, glucagon, hydrALAZINE **FOLLOWED BY** [START ON 6/14/2024] hydrALAZINE, labetaloL, oxygen      Family History   Problem Relation Name Age of Onset    Other (cardiovascular disorder) Father      Stroke Father      Arthritis Sister       Allergies  Other and Penicillins  (Not in a hospital admission)      Scheduled medications  aspirin, 81 mg, oral, Daily  atorvastatin, 40 mg, oral, Nightly  calcium carbonate, 1,250 mg, oral, BID  clopidogrel, 75 mg, oral, Daily  insulin lispro, 0-5 Units, subcutaneous, TID  metoprolol succinate XL, 100 mg, oral, Daily  perflutren lipid microspheres, 0.5-10 mL of dilution, intravenous, Once in imaging  perflutren protein A microsphere, 0.5 mL, intravenous, Once in imaging  polyethylene glycol, 17 g, oral, Daily  sulfur hexafluoride microsphr, 2 mL, intravenous, Once in imaging      Continuous medications     PRN medications  PRN medications: dextrose, dextrose, glucagon, glucagon, hydrALAZINE **FOLLOWED BY** [START ON 6/14/2024] hydrALAZINE, labetaloL, oxygen    Review of Systems   All other systems reviewed and are negative.      Last Recorded Vitals  Blood pressure (!) 144/109, pulse 78, temperature 36.8 °C (98.3 °F), resp. rate 18, height 1.651 m (5' 5\"), weight 49.9 kg (110 lb), SpO2 96%.    CONSTITUTIONAL:  No acute distress    CARDIOVASCULAR:  Normal pulses in the distal legs, no edema of either arm or either leg.  No carotid bruits.    MENTAL STATUS:  Awake, alert, fully oriented to self, place, and time, with fair short-term memory (2/3 5-minute recall), only fair awareness of recent events, normal attention span, concentration, and fund of knowledge.    SPEECH AND LANGUAGE:  Can name and repeat, follows all commands, has no dysarthria    FUNDOSCOPIC:  No papilledema    CRANIAL NERVES:  II-Vision present, visual fields full " to confrontational testing    III/IV/VI--EOMs are present in all directions.  Pupils are symmetrically reactive in dim light.  No ptosis.    V--Normal facial sensation.    VII--No facial asymmetry.    VIII--Hearing present to voice bilaterally.    IX/X--Symmetric soft palate rise.    XI--Normal trapezius power bilaterally.    XII--Tongue protrudes without deviation.    MOTOR:  Normal power, tone, and bulk in both arms and both legs.    SENSORY:  Normal pin sensation in both arms and both legs without distal-proximal gradient, asymmetry, or spinal sensory level.    COORDINATION:  Normal finger-to-nose and heel-to-shin testing in both arms and both legs.    REFLEXES are normal and symmetric at the biceps, triceps, brachioradialis, patella, and ankle.  The plantar responses are flexor.    GAIT is minimally abnormal as she has minor imbalance while turning, but no steppage, ataxia, shuffling, or spasticity.    NIH Stroke Scale: 0     Relevant Results  Results for orders placed or performed during the hospital encounter of 06/12/24 (from the past 24 hour(s))   CBC   Result Value Ref Range    WBC 7.8 4.4 - 11.3 x10*3/uL    nRBC 0.0 0.0 - 0.0 /100 WBCs    RBC 5.90 (H) 4.00 - 5.20 x10*6/uL    Hemoglobin 17.0 (H) 12.0 - 16.0 g/dL    Hematocrit 55.5 (H) 36.0 - 46.0 %    MCV 94 80 - 100 fL    MCH 28.8 26.0 - 34.0 pg    MCHC 30.6 (L) 32.0 - 36.0 g/dL    RDW 13.8 11.5 - 14.5 %    Platelets 209 150 - 450 x10*3/uL   Troponin I, High Sensitivity, Initial   Result Value Ref Range    Troponin I, High Sensitivity 9 0 - 13 ng/L   ECG 12 lead   Result Value Ref Range    Ventricular Rate 82 BPM    Atrial Rate 82 BPM    OR Interval 144 ms    QRS Duration 84 ms    QT Interval 396 ms    QTC Calculation(Bazett) 462 ms    P Axis 56 degrees    R Axis -30 degrees    T Axis -50 degrees    QRS Count 13 beats    Q Onset 218 ms    P Onset 146 ms    P Offset 206 ms    T Offset 416 ms    QTC Fredericia 439 ms   Troponin, High Sensitivity, 1 Hour    Result Value Ref Range    Troponin I, High Sensitivity 12 0 - 13 ng/L   Comprehensive metabolic panel   Result Value Ref Range    Glucose 285 (H) 74 - 99 mg/dL    Sodium 142 136 - 145 mmol/L    Potassium 3.9 3.5 - 5.3 mmol/L    Chloride 106 98 - 107 mmol/L    Bicarbonate 22 21 - 32 mmol/L    Anion Gap 18 10 - 20 mmol/L    Urea Nitrogen 37 (H) 6 - 23 mg/dL    Creatinine 1.56 (H) 0.50 - 1.05 mg/dL    eGFR 35 (L) >60 mL/min/1.73m*2    Calcium 10.5 (H) 8.6 - 10.3 mg/dL    Albumin 3.6 3.4 - 5.0 g/dL    Alkaline Phosphatase 107 33 - 136 U/L    Total Protein 8.0 6.4 - 8.2 g/dL    AST 18 9 - 39 U/L    Bilirubin, Total 1.1 0.0 - 1.2 mg/dL    ALT 19 7 - 45 U/L   Coagulation Screen   Result Value Ref Range    Protime 13.3 (H) 9.8 - 12.8 seconds    INR 1.2 (H) 0.9 - 1.1    aPTT 29 27 - 38 seconds   POCT GLUCOSE   Result Value Ref Range    POCT Glucose 225 (H) 74 - 99 mg/dL   Urinalysis with Reflex Culture and Microscopic   Result Value Ref Range    Color, Urine Light-Yellow Light-Yellow, Yellow, Dark-Yellow    Appearance, Urine Clear Clear    Specific Gravity, Urine 1.019 1.005 - 1.035    pH, Urine 6.5 5.0, 5.5, 6.0, 6.5, 7.0, 7.5, 8.0    Protein, Urine 200 (2+) (A) NEGATIVE, 10 (TRACE), 20 (TRACE) mg/dL    Glucose, Urine OVER (4+) (A) Normal mg/dL    Blood, Urine NEGATIVE NEGATIVE    Ketones, Urine NEGATIVE NEGATIVE mg/dL    Bilirubin, Urine NEGATIVE NEGATIVE    Urobilinogen, Urine Normal Normal mg/dL    Nitrite, Urine NEGATIVE NEGATIVE    Leukocyte Esterase, Urine NEGATIVE NEGATIVE   Urinalysis Microscopic   Result Value Ref Range    WBC, Urine 1-5 1-5, NONE /HPF    RBC, Urine NONE NONE, 1-2, 3-5 /HPF    Squamous Epithelial Cells, Urine 1-9 (SPARSE) Reference range not established. /HPF   POCT GLUCOSE   Result Value Ref Range    POCT Glucose 288 (H) 74 - 99 mg/dL   CBC and Auto Differential   Result Value Ref Range    WBC 6.4 4.4 - 11.3 x10*3/uL    nRBC 0.0 0.0 - 0.0 /100 WBCs    RBC 5.52 (H) 4.00 - 5.20 x10*6/uL     Hemoglobin 16.1 (H) 12.0 - 16.0 g/dL    Hematocrit 49.1 (H) 36.0 - 46.0 %    MCV 89 80 - 100 fL    MCH 29.2 26.0 - 34.0 pg    MCHC 32.8 32.0 - 36.0 g/dL    RDW 13.5 11.5 - 14.5 %    Platelets 258 150 - 450 x10*3/uL    Neutrophils % 68.4 40.0 - 80.0 %    Immature Granulocytes %, Automated 0.3 0.0 - 0.9 %    Lymphocytes % 23.0 13.0 - 44.0 %    Monocytes % 6.9 2.0 - 10.0 %    Eosinophils % 1.1 0.0 - 6.0 %    Basophils % 0.3 0.0 - 2.0 %    Neutrophils Absolute 4.36 1.60 - 5.50 x10*3/uL    Immature Granulocytes Absolute, Automated 0.02 0.00 - 0.50 x10*3/uL    Lymphocytes Absolute 1.47 0.80 - 3.00 x10*3/uL    Monocytes Absolute 0.44 0.05 - 0.80 x10*3/uL    Eosinophils Absolute 0.07 0.00 - 0.40 x10*3/uL    Basophils Absolute 0.02 0.00 - 0.10 x10*3/uL   Basic Metabolic Panel   Result Value Ref Range    Glucose 260 (H) 74 - 99 mg/dL    Sodium 143 136 - 145 mmol/L    Potassium 3.8 3.5 - 5.3 mmol/L    Chloride 108 (H) 98 - 107 mmol/L    Bicarbonate 23 21 - 32 mmol/L    Anion Gap 16 10 - 20 mmol/L    Urea Nitrogen 36 (H) 6 - 23 mg/dL    Creatinine 1.55 (H) 0.50 - 1.05 mg/dL    eGFR 35 (L) >60 mL/min/1.73m*2    Calcium 10.1 8.6 - 10.3 mg/dL   POCT GLUCOSE   Result Value Ref Range    POCT Glucose 236 (H) 74 - 99 mg/dL         I have personally reviewed the following imaging results:  CT head wo IV contrast    Result Date: 6/12/2024  Interpreted By:  Schoenberger, Joseph, STUDY: CT HEAD WO IV CONTRAST;  6/12/2024 3:42 pm   INDICATION: Signs/Symptoms:dizzy, rue weakness, history of breast ca.   COMPARISON: 11/12/2021   ACCESSION NUMBER(S): [id number]   ORDERING CLINICIAN: DENISE CLAROS   TECHNIQUE: Noncontrast axial CT scan of head was performed. Angled reformats in brain and bone windows were generated. The images were reviewed in bone, brain, blood and soft tissue windows.   FINDINGS: CSF Spaces: Enlarged due to parenchymal volume loss. Normal configuration with intact basal cisterns. There is no extraaxial fluid  collection.   Parenchyma: There are rather extensive confluent areas of deep white matter hypoattenuation which appears similar to the prior exam. Focal area of hypoattenuation in the right thalamus appear similar to the prior. In the interval since the prior exam there is new ill-defined area of hypoattenuation in the left thalamus. At the posterior aspect of there is a tiny high attenuation focus. This is new from the prior CT thalamic infarct with a tiny punctate hemorrhages consideration. MRI correlation could be helpful. 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   Calvarium: The calvarium is unremarkable.   Paranasal sinuses and mastoids: Visualized paranasal sinuses and mastoids are clear.       Age-indeterminate but possibly subacute lacunar infarct developing in the left thalamus. At the posterior aspect of this a tiny focus of hypoattenuation could represent a small component of hemorrhage. Close follow-up recommended. If warranted, MRI could be helpful to further evaluate.   Remote right thalamic infarct is unchanged.   Mild atrophy. Considerable chronic small vessel ischemic white matter demyelination unchanged from prior.     MACRO: Joseph Schoenberger discussed the significance and urgency of this critical finding by telephone with  DENISE CLAROS on 6/12/2024 at 4:03 pm.  (**-RCF-**) Findings:  See findings.     Signed by: Joseph Schoenberger 6/12/2024 4:07 PM Dictation workstation:   [id number]    MR brain wo IV contrast    Result Date: 6/12/2024  Interpreted By:  Héctor Deluca, STUDY: MR BRAIN WO IV CONTRAST; MR ANGIO NECK WO IV CONTRAST; MR ANGIO HEAD WO IV CONTRAST;  6/12/2024 11:08 pm   INDICATION: Signs/Symptoms:cva on ct.  Stroke protocol.   COMPARISON: Correlation made to noncontrast head CT of 06/12/2024.   ACCESSION NUMBER(S): [id number]; [id number]; [id number]   ORDERING CLINICIAN: ROBERT VINCENT   TECHNIQUE: Axial T2,  FLAIR, DWI, gradient echo T2 and  sagittal and coronal T1 weighted images of brain were acquired.   Time-of-flight MRA of the head  and neck was performed. The images were reviewed as source images and maximum intensity projections.   FINDINGS: Brain:   Multiple sequences are limited by motion artifact.   CSF Spaces: The ventricles, sulci and basal cisterns are within normal limits.   Parenchyma: Small focus of reduced diffusion in the posterior left cerebellar hemisphere (series [id number], images 104 and 108) is compatible with acute to subacute lacunar infarcts. No other reduced diffusion to suggest recent ischemia. Cystic space without restricted diffusion corresponding to left thalamic hypodensity seen on CT compatible with chronic lacunar infarct. Chronic lacunar infarct in the right thalamus redemonstrated. Advanced diffuse volume loss. Confluent white matter T2/FLAIR hyperintense signal abnormality in the supratentorial white matter and patchy signal abnormality in the matthew and cerebellar hemispheres, suggesting advanced chronic microvascular ischemic change. Small foci of susceptibility artifact in the left thalamus and right aspect of the cerebellar vermis suggests hemosiderin staining related to chronic microhemorrhage or nonspecific mineralization. No evidence of recent intracranial hemorrhage. There is no mass effect or midline shift.   Paranasal Sinuses and Mastoids: Visualized paranasal sinuses and mastoid air cells are unremarkable.   MRA of head:   Anterior circulation:    There is expected flow signal in bilateral intracranial internal carotid arteries, bilateral carotid terminals, bilateral proximal anterior and middle cerebral arteries.   Posterior circulation:    Bilateral intracranial vertebral arteries, vertebrobasilar junction, basilar artery demonstrate expected flow signal. PCAs are not well evaluated.   MRA of neck:   Limited by motion artifact.   Right carotid vessels:  There is expected flow  signal in the visualized portion of the common carotid artery.  There is mild attenuation of flow signal at the carotid bifurcation which may be secondary to flow related artifact. The internal carotid artery in the neck demonstrates expected flow signal.  There is % stenosis  .   Left carotid vessels:   There is expected flow signal in the visualized portion of the common carotid artery.  There is mild attenuation of flow signal at the carotid bifurcation which may be secondary to flow related artifact. The internal carotid artery in the neck demonstrates expected flow signal.  There is % stenosis  .   Vertebral vessels:   The visualized segments of the cervical vertebral arteries demonstrate expected flow signal.       MRI Brain:   Small focus of reduced diffusion in the posterior left cerebellar hemisphere (series [id number], images 104 and 108) is compatible with acute to subacute lacunar infarcts.   No other reduced diffusion to suggest recent ischemia. Cystic space without restricted diffusion corresponding to left thalamic hypodensity seen on CT compatible with chronic lacunar infarct. Chronic lacunar infarct in the right thalamus redemonstrated.   Background of brain parenchymal volume loss and advanced chronic microvascular ischemic changes.   MRA:   No definite evidence of major vessel cutoff or significant stenosis on MRA head and neck. Please note that PCAs are not well evaluated.   MACRO: None   Signed by: Héctor Deluca 6/12/2024 11:38 PM Dictation workstation:   [id number]    No results found for this or any previous visit.          Danis Hooks Jr., M.D., FAAN

## 2024-06-13 NOTE — PROGRESS NOTES
06/13/24 0630   [facility] Disability Status   Are you deaf or do you have serious difficulty hearing? N   Are you blind or do you have serious difficulty seeing, even when wearing glasses? N   Because of a physical, mental, or emotional condition, do you have serious difficulty concentrating, remembering, or making decisions? (5 years old or older) N   Do you have serious difficulty walking or climbing stairs? Y   Do you have serious difficulty dressing or bathing? N   Because of a physical, mental, or emotional condition, do you have serious difficulty doing errands alone such as visiting the doctor? Y

## 2024-06-13 NOTE — NURSING NOTE
1644: Report called to Camilo Almonte RN. Passed along that patient passed swallow screen which will be charted, and that patient is NPO for MRCP scheduled tonight at 1800, and then can have diet after study is done.

## 2024-06-13 NOTE — PROGRESS NOTES
Transitional Care Coordination Progress Note:  Plan per Medical/Surgical team: treatment of acute CVA with ASA, neuro, neuro-surgery & GI consult- pancreatic mass, PT/OT evals & ECHO pending  Status: Inpatient   Payor source: [employer]  Discharge disposition: Home with daughter   Potential Barriers: lightheaded & dizzy  ADOD: 6/14/2024   ONDINA Gamboa RN, BSN Transitional Care Coordinator ED# [phone]      06/13/24 0630   Discharge Planning   Living Arrangements Children   Support Systems Children   Assistance Needed neuro work up   Type of Residence Private residence   Number of Stairs to Enter Residence 8   Number of Stairs Within Residence 0   Home or Post Acute Services None   Patient expects to be discharged to: Home with daughter   Does the patient need discharge transport arranged? No   Financial Resource Strain   How hard is it for you to pay for the very basics like food, housing, medical care, and heating? Not hard   Housing Stability   In the last 12 months, was there a time when you were not able to pay the mortgage or rent on time? N   In the last 12 months, how many places have you lived? 1   In the last 12 months, was there a time when you did not have a steady place to sleep or slept in a shelter (including now)? N   Transportation Needs   In the past 12 months, has lack of transportation kept you from medical appointments or from getting medications? no   In the past 12 months, has lack of transportation kept you from meetings, work, or from getting things needed for daily living? No

## 2024-06-13 NOTE — PROGRESS NOTES
Home with daughter      06/13/24 [id number]   Current Planned Discharge Disposition   Current Planned Discharge Disposition Home

## 2024-06-14 ENCOUNTER — TELEMEDICINE (OUTPATIENT)
Dept: PHARMACY | Facility: HOSPITAL | Age: 74
End: 2024-06-14
Payer: MEDICARE

## 2024-06-14 DIAGNOSIS — I50.9 HEART FAILURE, UNSPECIFIED HF CHRONICITY, UNSPECIFIED HEART FAILURE TYPE (MULTI): ICD-10-CM

## 2024-06-14 DIAGNOSIS — R80.9 TYPE 2 DIABETES MELLITUS WITH DIABETIC MICROALBUMINURIA, WITHOUT LONG-TERM CURRENT USE OF INSULIN (MULTI): Primary | ICD-10-CM

## 2024-06-14 DIAGNOSIS — E11.29 TYPE 2 DIABETES MELLITUS WITH DIABETIC MICROALBUMINURIA, WITHOUT LONG-TERM CURRENT USE OF INSULIN (MULTI): Primary | ICD-10-CM

## 2024-06-14 LAB
GLUCOSE BLD MANUAL STRIP-MCNC: 224 MG/DL (ref 74–99)
GLUCOSE BLD MANUAL STRIP-MCNC: 300 MG/DL (ref 74–99)
GLUCOSE BLD MANUAL STRIP-MCNC: 354 MG/DL (ref 74–99)

## 2024-06-14 PROCEDURE — 99232 SBSQ HOSP IP/OBS MODERATE 35: CPT | Performed by: STUDENT IN AN ORGANIZED HEALTH CARE EDUCATION/TRAINING PROGRAM

## 2024-06-14 PROCEDURE — 99231 SBSQ HOSP IP/OBS SF/LOW 25: CPT | Performed by: INTERNAL MEDICINE

## 2024-06-14 PROCEDURE — 82947 ASSAY GLUCOSE BLOOD QUANT: CPT | Mod: 91

## 2024-06-14 PROCEDURE — 99222 1ST HOSP IP/OBS MODERATE 55: CPT | Performed by: INTERNAL MEDICINE

## 2024-06-14 PROCEDURE — 97165 OT EVAL LOW COMPLEX 30 MIN: CPT | Mod: GO

## 2024-06-14 PROCEDURE — 2500000001 HC RX 250 WO HCPCS SELF ADMINISTERED DRUGS (ALT 637 FOR MEDICARE OP): Performed by: HOSPITALIST

## 2024-06-14 PROCEDURE — 2500000001 HC RX 250 WO HCPCS SELF ADMINISTERED DRUGS (ALT 637 FOR MEDICARE OP): Performed by: INTERNAL MEDICINE

## 2024-06-14 PROCEDURE — 2500000002 HC RX 250 W HCPCS SELF ADMINISTERED DRUGS (ALT 637 FOR MEDICARE OP, ALT 636 FOR OP/ED): Performed by: HOSPITALIST

## 2024-06-14 PROCEDURE — 1200000002 HC GENERAL ROOM WITH TELEMETRY DAILY

## 2024-06-14 PROCEDURE — 2500000004 HC RX 250 GENERAL PHARMACY W/ HCPCS (ALT 636 FOR OP/ED): Performed by: HOSPITALIST

## 2024-06-14 ASSESSMENT — COGNITIVE AND FUNCTIONAL STATUS - GENERAL
DRESSING REGULAR UPPER BODY CLOTHING: A LOT
HELP NEEDED FOR BATHING: A LOT
PERSONAL GROOMING: A LITTLE
TOILETING: A LITTLE
DAILY ACTIVITIY SCORE: 17
DRESSING REGULAR LOWER BODY CLOTHING: A LITTLE

## 2024-06-14 ASSESSMENT — PAIN - FUNCTIONAL ASSESSMENT
PAIN_FUNCTIONAL_ASSESSMENT: 0-10
PAIN_FUNCTIONAL_ASSESSMENT: 0-10

## 2024-06-14 ASSESSMENT — PAIN SCALES - GENERAL
PAINLEVEL_OUTOF10: 0 - NO PAIN
PAINLEVEL_OUTOF10: 0 - NO PAIN

## 2024-06-14 ASSESSMENT — ACTIVITIES OF DAILY LIVING (ADL)
BATHING_ASSISTANCE: MODERATE
ADL_ASSISTANCE: INDEPENDENT

## 2024-06-14 NOTE — CARE PLAN
The patient's goals for the shift include Sleep    The clinical goals for the shift include Comfort and rest    Over the shift, the patient did not make progress toward the following goals. Barriers to progression include chronic illness. Recommendations to address these barriers include provide a quiet environment.

## 2024-06-14 NOTE — PROGRESS NOTES
Occupational Therapy    Evaluation    Patient Name: Saige Yates  MRN: [id number]  Today's Date: 6/14/2024       Assessment  IP OT Assessment  OT Assessment: Pt. 73 y.o. F presenting with L posterior cerebellar infarct. Pt. with decreased command following, processing, trunk control, sitting balance, endurance, B UE proprioception and light touch sensation, L UE shoulder AROM, ADL, transfer, and mobility skills. Pt. required max verbal and tactile cues for safe hand placement prior to and during transfers with walker. Pt. would benefit from LOW intensity therapy to increase safe functional participation in ADLs, transfers, and mobility.  Prognosis: Good  Evaluation/Treatment Tolerance: Patient limited by fatigue  Medical Staff Made Aware: Yes  End of Session Communication: Bedside nurse  End of Session Patient Position: Up in chair, Alarm on  Plan:  Treatment Interventions: ADL retraining, Functional transfer training, UE strengthening/ROM, Endurance training, Patient/family training, Equipment evaluation/education, Neuromuscular reeducation  OT Frequency: 3 times per week  OT Discharge Recommendations: Low intensity level of continued care  OT - OK to Discharge: Yes (per OT POC)    Subjective   Current Problem:  1. Cerebrovascular accident (CVA), unspecified mechanism (Multi)  Transthoracic Echo (TTE) Complete    Transthoracic Echo (TTE) Complete      2. Aortic aneurysm without rupture, unspecified portion of aorta (CMS-HCC)        3. Other general symptoms and signs  Transthoracic Echo (TTE) Complete    Transthoracic Echo (TTE) Complete      4. Other cerebrovascular disease  Transthoracic Echo (TTE) Complete      5. Chronic combined systolic and diastolic heart failure (Multi)  Referral to Cardiology      6. Cardiomyopathy, unspecified type (Multi)  MR cardiac morphology and function w and wo IV contrast        General:  General  Reason for Referral: Pt. 73 y.o. F presenting with L posterior cerebellar infarct per  MRI of brain 6/12, admitted with chief complaints of dizziness, slow ambulation and confusion.  Referred By: Aga KING)  Past Medical History Relevant to Rehab: DM2, restrictive lung disease, gout, HLD, polycythemia  Family/Caregiver Present: Yes (Son)  Caregiver Feedback: Receptive and engaged  Prior to Session Communication: Bedside nurse  Patient Position Received:  (Sitting EOB, caregiver present)  General Comment: Pt. pleasant and agreeable to OT eval, pt. poor historian per son and requires increased time to complete tasks d/t decreased processing skills and need for max verbal and tactile cues for safety.    Precautions:  Medical Precautions: Fall precautions (High)    Pain:  Pain Assessment  Pain Assessment: 0-10  Pain Score: 0 - No pain    Objective   Cognition:  Overall Cognitive Status: Within Functional Limits  Orientation Level: Oriented X4  Processing Speed: Delayed  Insight: Moderate     Home Living:  Type of Home: House (1 level house, 8 URIEL bilat. hand rail, walk in shower, raised toilet, shower seat, grab bars around toilet and in shower)  Lives With: Adult children (3 sons; at least one is present at home with her at all times.)  Home Adaptive Equipment: Walker rolling or standard, Cane     Prior Function:  Level of [place]: Independent with ADLs and functional transfers, Needs assistance with homemaking  Receives Help From: Family (3 sons)  ADL Assistance: Independent  Homemaking Assistance: Needs assistance (3 sons responsible for all IADLs)  Ambulatory Assistance: Independent (no AD)    IADL History:  Homemaking Responsibilities: No (sons perform all)    ADL:  Eating Assistance: Stand by  Grooming Assistance: Minimal  Bathing Assistance: Moderate  UE Dressing Assistance: Moderate  LE Dressing Assistance: Minimal (to don over hips)  Toileting Assistance with Device: Minimal  Functional Assistance: Minimal    Activity Tolerance:  Endurance: Tolerates 30 min exercise with multiple rests    Bed  Mobility/Transfers: Bed Mobility  Bed Mobility: Yes  Bed Mobility 1  Bed Mobility 1: Supine to sitting  Level of Assistance 1: Contact guard  Bed Mobility 2  Bed Mobility  2: Sitting to supine  Level of Assistance 2: Contact guard    Transfers  Transfer: Yes  Transfer 1  Transfer From 1: Bed to  Transfer to 1: Toilet  Technique 1: Sit to stand, Stand to sit  Transfer Device 1: Walker, Gait belt  Transfer Level of Assistance 1: Minimum assistance  Trials/Comments 1: Max verbal and tactile cues required for safe hand placement during transfers and safe walker use d/t delayed processing skills and command following  Transfers 2  Transfer From 2: Toilet to  Transfer to 2: Chair with arms  Technique 2: Sit to stand, Stand to sit  Transfer Device 2: Walker, Gait belt  Transfer Level of Assistance 2: Minimum assistance  Trials/Comments 2: Max verbal and tactile cues required for safe hand placement during transfers and safe walker use d/t delayed processing skills and command following    Sitting Balance:  Static Sitting Balance  Static Sitting-Balance Support: Feet supported  Static Sitting-Level of Assistance: Contact guard  Static Sitting-Comment/Number of Minutes: Verbal cues and tactile cues required to lean forward d/t tendency for right posterior lean  Dynamic Sitting Balance  Dynamic Sitting-Balance Support: Feet supported  Dynamic Sitting-Balance: Forward lean  Dynamic Sitting-Comments: Don progressing to CGA required to maintain dynamic sitting balance at EOB to don/doff socks and pants d/t tendency for right posterior lean    Standing Balance:  Static Standing Balance  Static Standing-Balance Support: Bilateral upper extremity supported (walker)  Static Standing-Level of Assistance: Contact guard  Dynamic Standing Balance  Dynamic Standing-Balance Support: Bilateral upper extremity supported  Dynamic Standing-Balance: Forward lean  Dynamic Standing-Comments: CGA required to maintain dynamic standing balance  with walker when donning pants    Vision: Vision - Basic Assessment  Current Vision: Wears glasses only for reading    Sensation:  Light Touch:  (~50% light touch sensation in B UE)  Proprioception:  (~50% proprioceptive deficits in B UE)    Strength:  Strength Comments: RUE: 4-/5 from shoulder distally to digits. LUE: 2+/5 shoulder flexion, 4-/5 distally from elbows to digits.    Perception:  Inattention/Neglect: Appears intact    Coordination:  Movements are Fluid and Coordinated: Yes     Hand Function:  Hand Function  Gross Grasp: Functional  Coordination: Functional    Extremities:   RUE : Within Functional Limits   LUE:  (Shoulder flexion to 80 degrees, WFL from elbows distally to digits)    Outcome Measures: [facility] Daily Activity  Putting on and taking off regular lower body clothing: A little  Bathing (including washing, rinsing, drying): A lot  Putting on and taking off regular upper body clothing: A lot  Toileting, which includes using toilet, bedpan or urinal: A little  Taking care of personal grooming such as brushing teeth: A little  Eating Meals: None  Daily Activity - Total Score: 17    Education Documentation  ADL Training, taught by JULIA Rapp at 6/14/2024  2:21 PM.  Learner: Patient  Readiness: Acceptance  Method: Explanation, Demonstration  Response: Verbalizes Understanding, Demonstrated Understanding, Needs Reinforcement    Education Comments  No comments found.    Note written by Suzy DUMONT, under supervision of MISBAH Aldridge   Goals:   Encounter Problems       Encounter Problems (Active)       ADLs       Patient will perform UB and LB bathing with stand by assist level of assistance and grab bars and shower chair.       Start:  06/14/24    Expected End:  06/28/24            Patient with complete upper body dressing with contact guard assist level of assistance donning and doffing all UE clothes with PRN adaptive equipment while edge of bed        Start:  06/14/24    Expected End:   06/28/24            Patient with complete lower body dressing with supervision level of assistance donning and doffing all LE clothes  with PRN adaptive equipment while edge of bed        Start:  06/14/24    Expected End:  06/28/24            Patient will complete daily grooming tasks brushing teeth and washing face/hair with supervision level of assistance and PRN adaptive equipment while standing.       Start:  06/14/24    Expected End:  06/28/24            Patient will complete toileting including hygiene clothing management/hygiene with independent level of assistance and raised toilet seat and grab bars.       Start:  06/14/24    Expected End:  06/28/24               BALANCE       Pt will maintain dynamic standing balance during ADL task with CGA level of assistance in order to demonstrate decreased risk of falling and improved postural control.       Start:  06/14/24    Expected End:  06/28/24               COGNITION/SAFETY       Pt. will complete ADLs and transfers requiring minimal verbal and/or tactile cues for safe hand placement with front wheeled walker to demonstrate increased safety awareness.      Start:  06/14/24    Expected End:  06/28/24               TRANSFERS       Patient will perform bed mobility independent level of assistance and bed rails in order to improve safety and independence with mobility       Start:  06/14/24    Expected End:  06/28/24            Patient will complete functional transfer to all surfaces with front wheeled walker with independent level of assistance.       Start:  06/14/24    Expected End:  06/28/24

## 2024-06-14 NOTE — PROGRESS NOTES
Saige Yates is a 73 y.o. female who was referred to the Clinical Pharmacy Team to complete a virtual Transitions of Care encounter for discharge medication optimization. The patient was referred for their DM and newly diagnosed HF.    Attending: Prasanna Patel MD    PCP: Scarlett Bangura MD    _______________________________________________________________________  PHARMACY ASSESSMENT    Home Pharmacy: [facility]   Meds to beds? yes    Adherence/Organization: Patient and 2 sons manage meds; son lays out meds each morning for the day, encouraged use of weekly planner. Some confusion on wether she takes amlodipine or not (LF 9/2023 for 3 month supply)  Adverse Effects: During follow up oncology visit on 6/12/24 patient was advised that anastrozole therapy (length of 9 years) needs to be completed as it could be contributing to significant weight loss/BMD    No issues reported in regards to affordability and accessibility  _______________________________________________________________________  DIABETES ASSESSMENT    CURRENT PHARMACOTHERAPY  - Trulicity 0.75mg/0.5ml: inject 1 pen subcutaneous once weekly   - Jardiance 10mg po every day     HISTORICAL PHARMACOTHERAPY  - Trulicity 1.5mg/0.5ml: was reduced to 0.75mg dose d/t mfg b/o; endo continued on lower dose at last appt (5/15/24) with low weight     BLOOD SUGAR TESTING AT HOME  -Frequency: 1-2x per day  -Meter: Possibly Accu-Chek but son not 100% sure  -CGM: No    SECONDARY PREVENTION  - Statin? Atorvastatin 20mg po QD  - ACE-I/ARB? Lisinopril 40mg QD  - Aspirin? No    Lab Results   Component Value Date    HGBA1C 8.1 (H) 03/30/2024       Lab Results   Component Value Date    CHOL 156 03/30/2024    CHOL 140 09/26/2023    CHOL 144 09/06/2022     Lab Results   Component Value Date    HDL 69.8 03/30/2024    HDL 72.1 09/26/2023    HDL 66.9 09/06/2022     Lab Results   Component Value Date    LDLCALC 71 03/30/2024     Lab Results  "  Component Value Date    TRIG 75 2024    TRIG 57 2023    TRIG 80 2022     No components found for: \"CHOLHDL\"    _______________________________________________________________________  CHRONIC HEART FAILURE ASSESSMENT  -The ASCVD Risk score (Simone VELAZQUEZ, et al., 2019) failed to calculate for the following reasons:    The patient has a prior MI or stroke diagnosis   -HTN present/diagnosed: yes    LVEF: 40-45%  eGFR: 35mL/min/1.73m*2  Beta blocker: Metoprolol Succ XL 100mg po QD  ACEi/ARB/ARNI: Lisinopril 40mg po QD  MRA: No   SGLT2i: Jardiance 10mg po QD  _______________________________________________________________________  PATIENT EDUCATION/GOALS  - Fasting B - 130 mg/dL  - Postprandial BG: less than 180 mg/dL  - A1c: less than 7%  - LDL Goal: < 70mg/dL  - Answered all patient questions and concerns to best of my ability  _______________________________________________________________________  RECOMMENDATIONS/PLAN  Follow up with patient in regards to medication adherence, encourage use of med list/weekly pill box  Patient to follow up with cardio in 6-8weeks as newly diagnosed HF  Continue all medications per medical team  Please send prescriptions to [facility] pharmacy for assistance on insurance prior authorization and copay. Prescriptions will be delivered to the patient's bedside prior to discharge with the Meds to Beds program.   Continuity of care will be provided by PCP and clinical pharmacy team      Heike Vega, Greg    Verbal consent to manage patient's drug therapy was obtained from the patient. They were informed they may decline to participate or withdraw from participation in pharmacy services at any time.    "

## 2024-06-14 NOTE — CONSULTS
"Inpatient consult to Cardiology  Consult performed by: July Henning, APRN-CNP  Consult ordered by: Prasanna Patel MD  Reason for consult: \"abnormal echo with reduced EF\"      History Of Present Illness:    Saige Yates is a 73 y.o. female with history of CVA 2021, diabetes, hypertension, hyperlipidemia, CKD stage 3, left sided breast cancer s/p lumpectomy and radiation in 2015, polycythemia vera , hyperandrogenism presented with complaints of dizziness, right upper extremity weakness, found with subacute CVA and recent 40 lb weight loss.  Found with acute left cerebral infarct-> neurology was consulted on admission.  Cardiology was consulted for \"abnormal echo with reduced EF\"    Afebrile, heart rate 86, blood pressure 1 160/109, 96% on room air.  Labs BUNs/CR 36/1.5, HS trop 12, H&H 16.1/49.1, CT of the head showed age-indeterminate but possibly subacute lacunar infarct in the left thalamus there is a tiny focus of hypoattenuation that could be hemorrhage, CT of the chest abdomen and pelvis revealed no acute process -->  ascending aortic aneurysm measuring 4 cm. The abdomen showed intrahepatic and extrahepatic biliary ductal dilatation with fullness of the pancreatic head and some mild renal cysts bilaterally. There was some wall thickening in the distal colon and rectum no other acute changes.  EKG shows sinus rhythm with T wave abnormalities and lateral leads.    Prior home cardiology meds include amlodipine 10 mg daily, atorvastatin 20 mg daily, ASA 81mg daily, lisinopril 40 mg daily, metoprolol succinate 100 mg daily.    Past Cardiology Tests (Last 3 Years):  TTE 6/13/24    1. Left ventricular systolic function is mildly decreased with a 40-45% estimated ejection fraction.   2. There is global hypokinesis of the left ventricle with minor regional variations.   3. Spectral Doppler shows an abnormal pattern of left ventricular diastolic filling.   4. There is asymmetric left ventricular hypertrophy.   5. " Slightly elevated RVSP.   6. Left ventricular cavity size is decreased.    Nuclear Stress Test 2017    1. No electrocardiographic evidence for ischemia at a maximal infusion.   2. Nuclear image results are reported separately.        Past Medical History:  She has a past medical history of Abnormal findings on diagnostic imaging of other specified body structures (12/08/2018), Abnormal levels of other serum enzymes (01/14/2017), Body mass index (BMI) 21.0-21.9, adult (11/24/2021), Body mass index (BMI) 22.0-22.9, adult (06/06/2022), Body mass index (BMI) 23.0-23.9, adult (07/07/2021), Body mass index (BMI) 24.0-24.9, adult (09/06/2019), Encounter for therapeutic drug level monitoring (03/12/2018), Essential (primary) hypertension (08/24/2017), Other cerebral infarction due to occlusion or stenosis of small artery (Multi) (04/08/2019), Other conditions influencing health status (01/22/2018), Other specified disorders of breast (04/26/2016), Personal history of diseases of the skin and subcutaneous tissue (11/23/2013), Personal history of other specified conditions (11/07/2015), Personal history of transient ischemic attack (TIA), and cerebral infarction without residual deficits (12/07/2019), and Unspecified lump in the left breast, unspecified quadrant (08/24/2017).    Past Surgical History:  She has a past surgical history that includes Gallbladder surgery (02/09/2015); Other surgical history (04/23/2018); Colonoscopy (05/20/2017); Other surgical history (04/27/2015); [place] lymph node biopsy (04/27/2015); Breast biopsy (02/26/2015); MR angio head wo IV contrast (11/12/2021); and Breast lumpectomy (Left).      Social History:  She reports that she has never smoked. She has never used smokeless tobacco. She reports that she does not drink alcohol and does not use drugs.    Family History:  Family History   Problem Relation Name Age of Onset    Other (cardiovascular disorder) Father      Stroke Father       "Arthritis Sister          Allergies:  Other and Penicillins    ROS:  10 point review of systems including (Constitutional, Eyes, ENMT, Respiratory, Cardiac, Gastrointestinal, Neurological, Psychiatric, and Hematologic) was performed and is otherwise negative.    Objective Data:  Last Recorded Vitals:  Vitals:    24 1449 24 2106 24 0508 24 0739   BP: (!) 137/102 (!) 147/104 (!) 147/98 (!) 160/109   BP Location: Left arm Left arm Right arm Left arm   Patient Position: Lying Lying Lying Sitting   Pulse: 80 85 74 86   Resp: 18 18 18 18   Temp: 37 °C (98.6 °F) 36.9 °C (98.4 °F) 36.2 °C (97.2 °F) 36.8 °C (98.2 °F)   TempSrc: Temporal Temporal Temporal Temporal   SpO2: 92% 95% 95% 96%   Weight:       Height:         Medical Gas Therapy: None (Room air)  Weight  Av.1 kg (110 lb 7.1 oz)  Min: 49.9 kg (110 lb)  Max: 50.3 kg (110 lb 14.3 oz)      LABS:  CMP:  Results from last 7 days   Lab Units 24  0702 24  1206   SODIUM mmol/L 143 142   POTASSIUM mmol/L 3.8 3.9   CHLORIDE mmol/L 108* 106   CO2 mmol/L 23 22   ANION GAP mmol/L 16 18   BUN mg/dL 36* 37*   CREATININE mg/dL 1.55* 1.56*   EGFR mL/min/1.73m*2 35* 35*   ALBUMIN g/dL  --  3.6   ALT U/L  --  19   AST U/L  --  18   BILIRUBIN TOTAL mg/dL  --  1.1     CBC:  Results from last 7 days   Lab Units 24  0702 24  1111   WBC AUTO x10*3/uL 6.4 7.8   HEMOGLOBIN g/dL 16.1* 17.0*   HEMATOCRIT % 49.1* 55.5*   PLATELETS AUTO x10*3/uL 258 209   MCV fL 89 94     COAG:   Results from last 7 days   Lab Units 24  1632   INR  1.2*     ABO: No results found for: \"ABO\"  HEME/ENDO:     CARDIAC:   Results from last 7 days   Lab Units 24  1206 24  1111   TROPHS ng/L 12 9             Last I/O:  No intake or output data in the 24 hours ending 24 1041  Net IO Since Admission: No IO data has been entered for this period [24 1041]      Imaging Results:  Transthoracic Echo (TTE) Complete    Result Date: 2024   " [facility], [address] [place] [address]              Tel [phone] and Fax [phone] TRANSTHORACIC ECHOCARDIOGRAM REPORT  Patient Name:      NIKOLE Ross Physician:    [id number] Sravan Plasencia DO Study Date:        6/13/2024            Ordering Provider:    [id number] ROBERT VINCENT MRN/PID:           [id number]             Fellow: Accession#:        [id number]         Nurse: Date of Birth/Age: 1950 / 73      Sonographer:          Jose Garcia RDCS                    years Gender:            F                    Additional Staff: Height:            165.00 cm            Admit Date: Weight:            49.90 kg             Admission Status:     Inpatient -                                                               Routine BSA / BMI:         1.53 m2 / 18.33      Encounter#:           [id number]                    kg/m2                                         Department Location:  [facility] Non                                                               Invasive Blood Pressure: 136 /102 mmHg Study Type:    TRANSTHORACIC ECHO (TTE) COMPLETE Diagnosis/ICD: Other cerebrovascular disease-I67.89 Indication:    Cerebrovascular Accident CPT Code:      Echo Complete w Full [id number] Patient History: Pertinent History: Hyperlipidemia and HTN. Study Detail: The following Echo studies were performed: 2D, M-Mode, Doppler and               color flow. Technically challenging study due to poor acoustic               windows and patient lying in supine position.  PHYSICIAN INTERPRETATION: Left Ventricle: The left ventricular systolic function is mildly decreased, with an estimated ejection fraction of 40-45%. The calculated ejection fraction is mildly decreased at 41 % using the Carlin's Bi-plane MOD calculation. There is global hypokinesis of the left ventricle with minor regional variations. The left ventricular cavity  size is decreased. The left ventricular septal wall thickness is severely increased. There is asymmetric left ventricular hypertrophy involving the septal wall. Spectral Doppler shows an abnormal pattern of left ventricular diastolic filling. Left Atrium: The left atrium is normal in size. Right Ventricle: The right ventricle is normal in size. There is normal right ventricular global systolic function. Right Atrium: The right atrium was not well visualized. Aortic Valve: The aortic valve is trileaflet. There is no evidence of aortic valve regurgitation. The peak instantaneous gradient of the aortic valve is 3.3 mmHg. Mitral Valve: The mitral valve is mildly thickened. There is no systolic anterior motion. There is trace mitral valve regurgitation. Tricuspid Valve: The tricuspid valve is structurally normal. There is trace to mild tricuspid regurgitation. The Doppler estimated RVSP is slightly elevated at 36.9 mmHg. Pulmonic Valve: The pulmonic valve is structurally normal. There is physiologic pulmonic valve regurgitation. Pericardium: There is no pericardial effusion noted. Aorta: The aortic root is normal. There is mild dilatation of the ascending aorta. There is no dilatation of the aortic root. Systemic Veins: The inferior vena cava appears to be of normal size. There is IVC inspiratory collapse greater than 50%. In comparison to the previous echocardiogram(s): There are no prior studies on this patient for comparison purposes.  CONCLUSIONS:  1. Left ventricular systolic function is mildly decreased with a 40-45% estimated ejection fraction.  2. There is global hypokinesis of the left ventricle with minor regional variations.  3. Spectral Doppler shows an abnormal pattern of left ventricular diastolic filling.  4. There is asymmetric left ventricular hypertrophy.  5. Slightly elevated RVSP.  6. Left ventricular cavity size is decreased. QUANTITATIVE DATA SUMMARY: 2D MEASUREMENTS:                          Normal  Ranges: Ao Root d:     3.00 cm   (2.0-3.7cm) LAs:           2.10 cm   (2.7-4.0cm) IVSd:          1.78 cm   (0.6-1.1cm) LVPWd:         1.26 cm   (0.6-1.1cm) LVIDd:         2.62 cm   (3.9-5.9cm) LVIDs:         2.26 cm LV Mass Index: 89.0 g/m2 LV % FS        13.7 % LA VOLUME:                               Normal Ranges: LA Vol A4C:        16.6 ml    (22+/-6mL/m2) LA Vol A2C:        31.9 ml LA Vol BP:         24.1 ml LA Vol Index A4C:  10.8ml/m2 LA Vol Index A2C:  20.8 ml/m2 LA Vol Index BP:   15.7 ml/m2 LA Area A4C:       9.0 cm2 LA Area A2C:       13.0 cm2 LA Major Axis A4C: 4.1 cm LA Major Axis A2C: 4.5 cm LA Volume Index:   15.7 ml/m2 AORTA MEASUREMENTS:                    Normal Ranges: Asc Ao, d: 3.45 cm (2.1-3.4cm) LV SYSTOLIC FUNCTION BY 2D PLANIMETRY (MOD):                     Normal Ranges: EF-A4C View: 46.5 % (>=55%) EF-A2C View: 34.7 % EF-Biplane:  40.9 % LV DIASTOLIC FUNCTION:                               Normal Ranges: MV Peak E:        0.24 m/s    (0.7-1.2 m/s) MV Peak A:        1.26 m/s    (0.42-0.7 m/s) E/A Ratio:        0.19        (1.0-2.2) MV lateral e'     0.12 m/s MV medial e'      0.06 m/s PulmV Sys Moses:    72.40 cm/s PulmV Chin Moses:   54.00 cm/s PulmV S/D Moses:    1.30 PulmV A Revs Moses: 37.30 cm/s PulmV A Revs Dur: 129.00 msec MITRAL VALVE:                      Normal Ranges: MV Vmax:    1.07 m/s (<=1.3m/s) MV peak P.6 mmHg (<5mmHg) MV mean P.0 mmHg (<2mmHg) MV DT:      39 msec  (150-240msec) AORTIC VALVE:                         Normal Ranges: AoV Vmax:      0.90 m/s (<=1.7m/s) AoV Peak PG:   3.3 mmHg (<20mmHg) LVOT Max Moses:  0.72 m/s (<=1.1m/s) LVOT VTI:      13.90 cm LVOT Diameter: 1.90 cm  (1.8-2.4cm) AoV Area,Vmax: 2.27 cm2 (2.5-4.5cm2)  RIGHT VENTRICLE: TAPSE: 19.3 mm RV s'  0.19 m/s TRICUSPID VALVE/RVSP:                             Normal Ranges: Peak TR Velocity: 2.91 m/s RV Syst Pressure: 36.9 mmHg (< 30mmHg) IVC Diam:         1.70 cm PULMONIC VALVE:                          Normal Ranges: PV Accel Time: 90 msec  (>120ms) PV Max Moses:    0.7 m/s  (0.6-0.9m/s) PV Max P.0 mmHg Pulmonary Veins: PulmV A Revs Dur: 129.00 msec PulmV A Revs Moses: 37.30 cm/s PulmV Chin Moses:   54.00 cm/s PulmV S/D Moses:    1.30 PulmV Sys Moses:    72.40 cm/s  [id number] Sravan Plasencia  Electronically signed on 2024 at 4:58:06 PM  ** Final **     MR brain wo IV contrast    Result Date: 2024  Interpreted By:  Héctor Deluca, STUDY: MR BRAIN WO IV CONTRAST; MR ANGIO NECK WO IV CONTRAST; MR ANGIO HEAD WO IV CONTRAST;  2024 11:08 pm   INDICATION: Signs/Symptoms:cva on ct.  Stroke protocol.   COMPARISON: Correlation made to noncontrast head CT of 2024.   ACCESSION NUMBER(S): [id number]; [id number]; [id number]   ORDERING CLINICIAN: ROBERT VINCENT   TECHNIQUE: Axial T2, FLAIR, DWI, gradient echo T2 and  sagittal and coronal T1 weighted images of brain were acquired.   Time-of-flight MRA of the head  and neck was performed. The images were reviewed as source images and maximum intensity projections.   FINDINGS: Brain:   Multiple sequences are limited by motion artifact.   CSF Spaces: The ventricles, sulci and basal cisterns are within normal limits.   Parenchyma: Small focus of reduced diffusion in the posterior left cerebellar hemisphere (series [id number], images 104 and 108) is compatible with acute to subacute lacunar infarcts. No other reduced diffusion to suggest recent ischemia. Cystic space without restricted diffusion corresponding to left thalamic hypodensity seen on CT compatible with chronic lacunar infarct. Chronic lacunar infarct in the right thalamus redemonstrated. Advanced diffuse volume loss. Confluent white matter T2/FLAIR hyperintense signal abnormality in the supratentorial white matter and patchy signal abnormality in the matthew and cerebellar hemispheres, suggesting advanced chronic microvascular ischemic change. Small foci of susceptibility artifact in the left thalamus and right  aspect of the cerebellar vermis suggests hemosiderin staining related to chronic microhemorrhage or nonspecific mineralization. No evidence of recent intracranial hemorrhage. There is no mass effect or midline shift.   Paranasal Sinuses and Mastoids: Visualized paranasal sinuses and mastoid air cells are unremarkable.   MRA of head:   Anterior circulation:    There is expected flow signal in bilateral intracranial internal carotid arteries, bilateral carotid terminals, bilateral proximal anterior and middle cerebral arteries.   Posterior circulation:    Bilateral intracranial vertebral arteries, vertebrobasilar junction, basilar artery demonstrate expected flow signal. PCAs are not well evaluated.   MRA of neck:   Limited by motion artifact.   Right carotid vessels:  There is expected flow signal in the visualized portion of the common carotid artery.  There is mild attenuation of flow signal at the carotid bifurcation which may be secondary to flow related artifact. The internal carotid artery in the neck demonstrates expected flow signal.  There is % stenosis  .   Left carotid vessels:   There is expected flow signal in the visualized portion of the common carotid artery.  There is mild attenuation of flow signal at the carotid bifurcation which may be secondary to flow related artifact. The internal carotid artery in the neck demonstrates expected flow signal.  There is % stenosis  .   Vertebral vessels:   The visualized segments of the cervical vertebral arteries demonstrate expected flow signal.       MRI Brain:   Small focus of reduced diffusion in the posterior left cerebellar hemisphere (series [id number], images 104 and 108) is compatible with acute to subacute lacunar infarcts.   No other reduced diffusion to suggest recent ischemia. Cystic space without restricted diffusion corresponding to left thalamic hypodensity seen on CT compatible with chronic lacunar infarct. Chronic lacunar infarct in the right  thalamus redemonstrated.   Background of brain parenchymal volume loss and advanced chronic microvascular ischemic changes.   MRA:   No definite evidence of major vessel cutoff or significant stenosis on MRA head and neck. Please note that PCAs are not well evaluated.   MACRO: None   Signed by: Héctor Deluca 6/12/2024 11:38 PM Dictation workstation:   [id number]    MR angio head wo IV contrast    Result Date: 6/12/2024  Interpreted By:  Héctor Deluca, STUDY: MR BRAIN WO IV CONTRAST; MR ANGIO NECK WO IV CONTRAST; MR ANGIO HEAD WO IV CONTRAST;  6/12/2024 11:08 pm   INDICATION: Signs/Symptoms:cva on ct.  Stroke protocol.   COMPARISON: Correlation made to noncontrast head CT of 06/12/2024.   ACCESSION NUMBER(S): [id number]; [id number]; [id number]   ORDERING CLINICIAN: ROBERT VINCENT   TECHNIQUE: Axial T2, FLAIR, DWI, gradient echo T2 and  sagittal and coronal T1 weighted images of brain were acquired.   Time-of-flight MRA of the head  and neck was performed. The images were reviewed as source images and maximum intensity projections.   FINDINGS: Brain:   Multiple sequences are limited by motion artifact.   CSF Spaces: The ventricles, sulci and basal cisterns are within normal limits.   Parenchyma: Small focus of reduced diffusion in the posterior left cerebellar hemisphere (series [id number], images 104 and 108) is compatible with acute to subacute lacunar infarcts. No other reduced diffusion to suggest recent ischemia. Cystic space without restricted diffusion corresponding to left thalamic hypodensity seen on CT compatible with chronic lacunar infarct. Chronic lacunar infarct in the right thalamus redemonstrated. Advanced diffuse volume loss. Confluent white matter T2/FLAIR hyperintense signal abnormality in the supratentorial white matter and patchy signal abnormality in the matthew and cerebellar hemispheres, suggesting advanced chronic microvascular ischemic change. Small foci of susceptibility artifact in the left  thalamus and right aspect of the cerebellar vermis suggests hemosiderin staining related to chronic microhemorrhage or nonspecific mineralization. No evidence of recent intracranial hemorrhage. There is no mass effect or midline shift.   Paranasal Sinuses and Mastoids: Visualized paranasal sinuses and mastoid air cells are unremarkable.   MRA of head:   Anterior circulation:    There is expected flow signal in bilateral intracranial internal carotid arteries, bilateral carotid terminals, bilateral proximal anterior and middle cerebral arteries.   Posterior circulation:    Bilateral intracranial vertebral arteries, vertebrobasilar junction, basilar artery demonstrate expected flow signal. PCAs are not well evaluated.   MRA of neck:   Limited by motion artifact.   Right carotid vessels:  There is expected flow signal in the visualized portion of the common carotid artery.  There is mild attenuation of flow signal at the carotid bifurcation which may be secondary to flow related artifact. The internal carotid artery in the neck demonstrates expected flow signal.  There is % stenosis  .   Left carotid vessels:   There is expected flow signal in the visualized portion of the common carotid artery.  There is mild attenuation of flow signal at the carotid bifurcation which may be secondary to flow related artifact. The internal carotid artery in the neck demonstrates expected flow signal.  There is % stenosis  .   Vertebral vessels:   The visualized segments of the cervical vertebral arteries demonstrate expected flow signal.       MRI Brain:   Small focus of reduced diffusion in the posterior left cerebellar hemisphere (series [id number], images 104 and 108) is compatible with acute to subacute lacunar infarcts.   No other reduced diffusion to suggest recent ischemia. Cystic space without restricted diffusion corresponding to left thalamic hypodensity seen on CT compatible with chronic lacunar infarct. Chronic lacunar infarct  in the right thalamus redemonstrated.   Background of brain parenchymal volume loss and advanced chronic microvascular ischemic changes.   MRA:   No definite evidence of major vessel cutoff or significant stenosis on MRA head and neck. Please note that PCAs are not well evaluated.   MACRO: None   Signed by: Héctor Deluca 6/12/2024 11:38 PM Dictation workstation:   [id number]    MR angio neck wo IV contrast    Result Date: 6/12/2024  Interpreted By:  Héctor Deluca, STUDY: MR BRAIN WO IV CONTRAST; MR ANGIO NECK WO IV CONTRAST; MR ANGIO HEAD WO IV CONTRAST;  6/12/2024 11:08 pm   INDICATION: Signs/Symptoms:cva on ct.  Stroke protocol.   COMPARISON: Correlation made to noncontrast head CT of 06/12/2024.   ACCESSION NUMBER(S): [id number]; [id number]; [id number]   ORDERING CLINICIAN: ROBERT VINCENT   TECHNIQUE: Axial T2, FLAIR, DWI, gradient echo T2 and  sagittal and coronal T1 weighted images of brain were acquired.   Time-of-flight MRA of the head  and neck was performed. The images were reviewed as source images and maximum intensity projections.   FINDINGS: Brain:   Multiple sequences are limited by motion artifact.   CSF Spaces: The ventricles, sulci and basal cisterns are within normal limits.   Parenchyma: Small focus of reduced diffusion in the posterior left cerebellar hemisphere (series [id number], images 104 and 108) is compatible with acute to subacute lacunar infarcts. No other reduced diffusion to suggest recent ischemia. Cystic space without restricted diffusion corresponding to left thalamic hypodensity seen on CT compatible with chronic lacunar infarct. Chronic lacunar infarct in the right thalamus redemonstrated. Advanced diffuse volume loss. Confluent white matter T2/FLAIR hyperintense signal abnormality in the supratentorial white matter and patchy signal abnormality in the matthew and cerebellar hemispheres, suggesting advanced chronic microvascular ischemic change. Small foci of susceptibility artifact in  the left thalamus and right aspect of the cerebellar vermis suggests hemosiderin staining related to chronic microhemorrhage or nonspecific mineralization. No evidence of recent intracranial hemorrhage. There is no mass effect or midline shift.   Paranasal Sinuses and Mastoids: Visualized paranasal sinuses and mastoid air cells are unremarkable.   MRA of head:   Anterior circulation:    There is expected flow signal in bilateral intracranial internal carotid arteries, bilateral carotid terminals, bilateral proximal anterior and middle cerebral arteries.   Posterior circulation:    Bilateral intracranial vertebral arteries, vertebrobasilar junction, basilar artery demonstrate expected flow signal. PCAs are not well evaluated.   MRA of neck:   Limited by motion artifact.   Right carotid vessels:  There is expected flow signal in the visualized portion of the common carotid artery.  There is mild attenuation of flow signal at the carotid bifurcation which may be secondary to flow related artifact. The internal carotid artery in the neck demonstrates expected flow signal.  There is % stenosis  .   Left carotid vessels:   There is expected flow signal in the visualized portion of the common carotid artery.  There is mild attenuation of flow signal at the carotid bifurcation which may be secondary to flow related artifact. The internal carotid artery in the neck demonstrates expected flow signal.  There is % stenosis  .   Vertebral vessels:   The visualized segments of the cervical vertebral arteries demonstrate expected flow signal.       MRI Brain:   Small focus of reduced diffusion in the posterior left cerebellar hemisphere (series 768476, images 104 and 108) is compatible with acute to subacute lacunar infarcts.   No other reduced diffusion to suggest recent ischemia. Cystic space without restricted diffusion corresponding to left thalamic hypodensity seen on CT compatible with chronic lacunar infarct. Chronic  lacunar infarct in the right thalamus redemonstrated.   Background of brain parenchymal volume loss and advanced chronic microvascular ischemic changes.   MRA:   No definite evidence of major vessel cutoff or significant stenosis on MRA head and neck. Please note that PCAs are not well evaluated.   MACRO: None   Signed by: Héctor Deluca 6/12/2024 11:38 PM Dictation workstation:   [id number]    BI mammo bilateral screening tomosynthesis    Result Date: 6/12/2024  Interpreted By:  Fausto Chong, STUDY: BI MAMMO BILATERAL SCREENING TOMOSYNTHESIS;  6/12/2024 9:57 am   ACCESSION NUMBER(S): [id number]   ORDERING CLINICIAN: RANDI BLANCAS   INDICATION: Screening. Patient with history of left invasive ductal carcinoma diagnosed in 2015 status post lumpectomy and 1/3 involved lymph node on sentinel lymph node biopsy status post chemotherapy on endocrine therapy.   COMPARISON: 06/07/2023 and all relevant prior breast imaging exams available at the time of dictation.   FINDINGS: 2D and tomosynthesis images were reviewed at 1 mm slice thickness.   Density:  The breast tissue is heterogeneously dense, which may obscure small masses.   Postsurgical changes of lumpectomy are partially visualized in the upper-outer left breast at posterior depth. The lumpectomy scar is partially visualized on the CC view due to posterior location.   Indeterminate calcifications are seen in superior central left breast at anterior depth. No suspicious masses or calcifications are identified in the right breast.       1. Indeterminate left breast calcifications. Additional evaluation is recommended with diagnostic mammogram with magnification views.   2. No mammographic evidence of malignancy in the right breast.   Of note, a same-day diagnostic evaluation could not be performed due to patient's dizziness which required assessment at the emergency department.   BI-RADS CATEGORY:   BI-RADS Category:  0 Incomplete; Need Additional Imaging  Evaluation and/or Prior Mammograms for Comparison. Recommendation:  Additional Imaging. Recommended Date:  Immediate. Laterality:  Left.   For any future breast imaging appointments, please call [phone] ([phone]).   MACRO: None   Signed by: Fausto Chong 6/12/2024 5:23 PM Dictation workstation:   [id number]    CT chest abdomen pelvis wo IV contrast    Result Date: 6/12/2024  STUDY: CT Chest, Abdomen, and Pelvis without IV Contrast; 6/12/2024 3:43 PM INDICATION: Unexplained weight loss.  Additional History:  Cancer. COMPARISON: 2/22/2023 US renal; 11/12/2021 CT chest. ACCESSION NUMBER(S): [id number] ORDERING CLINICIAN: DENISE CLAROS TECHNIQUE: CT of the chest, abdomen, and pelvis was performed.  Contiguous axial images were obtained at 3 mm slice thickness through the chest, abdomen, and pelvis.  Coronal and sagittal reconstructions at 3 mm slice thickness were performed.  No intravenous contrast was administered.  FINDINGS: Please note that the evaluation of vessels, lymph nodes and organs is limited without intravenous contrast. CHEST: MEDIASTINUM: The heart is normal in size without pericardial effusion.  Coronary artery calcifications are identified.  Ascending aorta measures 4.0 cm and the descending aorta measures 3.0 cm. LUNGS/PLEURA: There is no pleural effusion, pleural thickening, or pneumothorax. The airways are patent. Lungs are clear without consolidation, interstitial disease, or suspicious nodules. LYMPH NODES: Thoracic lymph nodes are not enlarged.  There is prominence of thyroid gland with thyroid nodules measuring up to 2.1 x 1.6 cm in the right thyroid gland. ABDOMEN:  LIVER: No hepatomegaly.  Smooth surface contour.  Normal attenuation.  BILE DUCTS: There is mild intrahepatic biliary ductal dilatation.  The common bile duct measures 9 mm.  GALLBLADDER: The gallbladder is absent. STOMACH: No abnormalities identified.  PANCREAS: There is fullness of the pancreatic head.   Underlying mass cannot be excluded.  SPLEEN: No splenomegaly or focal splenic lesion.  ADRENAL GLANDS: No thickening or nodules.  KIDNEYS AND URETERS: Kidneys are normal in size and location.  No renal or ureteral calculi.  Multiple bilateral renal cysts are demonstrated and small bilateral knob strict renal calculi.  Complex cyst at the anterior mid pole left kidney is redemonstrated.  PELVIS:  BLADDER: No abnormalities identified.  REPRODUCTIVE ORGANS: There is fibroid prominent uterus. BOWEL: Mild wall thickening in the distal colon/rectum..  There is moderate colonic stool.  The appendix is normal.  VESSELS: No abnormalities identified.  Abdominal aorta is normal in caliber.  PERITONEUM/RETROPERITONEUM/LYMPH NODES: No free fluid.  No pneumoperitoneum. No lymphadenopathy.  ABDOMINAL WALL: No abnormalities identified. SOFT TISSUES: No abnormalities identified.  BONES: No acute fracture or aggressive osseous lesion.  There is disc space narrowing and endplate degenerative changes in the lumbar spine. There is minimal anterolisthesis of L4 on L5 and L5 on S1 with facet arthrosis.    Chest: No acute cardiopulmonary disease.  Ascending aortic aneurysm measuring 4.0 cm. Bilateral thyroid nodules. ABDOMEN: Intrahepatic and extrahepatic biliary ductal dilatation. Fullness of the pancreatic head.  Underlying mass cannot be excluded. Bilateral nonspecific renal calculi with multiple bilateral renal cysts.  Complex cyst midpole of the anterior left kidney is demonstrated.  This appears similar prior ultrasound.  Findings would better evaluated with contrast. Pelvis: Wall thickening within the distal colon/rectum.  Please correlate for colitis/proctitis.  Fibroid uterus.  No other acute inflammatory changes.  Moderate colonic stool. Signed by Layton Morales DO    CT head wo IV contrast    Result Date: 6/12/2024  Interpreted By:  Schoenberger, Joseph, STUDY: CT HEAD WO IV CONTRAST;  6/12/2024 3:42 pm   INDICATION:  Signs/Symptoms:dizzy, rue weakness, history of breast ca.   COMPARISON: 11/12/2021   ACCESSION NUMBER(S): [id number]   ORDERING CLINICIAN: DENISE CLAROS   TECHNIQUE: Noncontrast axial CT scan of head was performed. Angled reformats in brain and bone windows were generated. The images were reviewed in bone, brain, blood and soft tissue windows.   FINDINGS: CSF Spaces: Enlarged due to parenchymal volume loss. Normal configuration with intact basal cisterns. There is no extraaxial fluid collection.   Parenchyma: There are rather extensive confluent areas of deep white matter hypoattenuation which appears similar to the prior exam. Focal area of hypoattenuation in the right thalamus appear similar to the prior. In the interval since the prior exam there is new ill-defined area of hypoattenuation in the left thalamus. At the posterior aspect of there is a tiny high attenuation focus. This is new from the prior CT thalamic infarct with a tiny punctate hemorrhages consideration. MRI correlation could be helpful. 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   Calvarium: The calvarium is unremarkable.   Paranasal sinuses and mastoids: Visualized paranasal sinuses and mastoids are clear.       Age-indeterminate but possibly subacute lacunar infarct developing in the left thalamus. At the posterior aspect of this a tiny focus of hypoattenuation could represent a small component of hemorrhage. Close follow-up recommended. If warranted, MRI could be helpful to further evaluate.   Remote right thalamic infarct is unchanged.   Mild atrophy. Considerable chronic small vessel ischemic white matter demyelination unchanged from prior.     MACRO: Joseph Schoenberger discussed the significance and urgency of this critical finding by telephone with  DENISE CLAROS on 6/12/2024 at 4:03 pm.  (**-RCF-**) Findings:  See findings.     Signed by: Joseph Schoenberger  6/12/2024 4:07 PM Dictation workstation:   [id number]    ECG 12 lead    Result Date: 6/12/2024  Normal sinus rhythm Possible Left atrial enlargement Left axis deviation Anterior infarct , age undetermined ST & T wave abnormality, consider lateral ischemia Abnormal ECG When compared with ECG of 12-NOV-2021 10:50, T wave inversion now evident in Inferior leads Inverted T waves have replaced nonspecific T wave abnormality in Lateral leads QT has lengthened      Inpatient Medications:  Scheduled medications   Medication Dose Route Frequency    aspirin  81 mg oral Daily    atorvastatin  40 mg oral Nightly    calcium carbonate  1,250 mg oral BID    insulin lispro  0-5 Units subcutaneous TID    metoprolol succinate XL  100 mg oral Daily    perflutren lipid microspheres  0.5-10 mL of dilution intravenous Once in imaging    perflutren protein A microsphere  0.5 mL intravenous Once in imaging    polyethylene glycol  17 g oral Daily    sulfur hexafluoride microsphr  2 mL intravenous Once in imaging     PRN medications   Medication    dextrose    dextrose    glucagon    glucagon    hydrALAZINE    Followed by    hydrALAZINE    oxygen     Continuous Medications   Medication Dose Last Rate       Outpatient Medications:  Current Outpatient Medications   Medication Instructions    amLODIPine (NORVASC) 10 mg, oral, Daily    anastrozole (ARIMIDEX) 1 mg, oral, Daily, Swallow whole with a drink of water.    atorvastatin (LIPITOR) 20 mg, oral, Daily    b complex vitamins capsule 1 tablet, oral, Daily    calcium carbonate 1,250 mg, oral, 2 times daily    denosumab (Prolia) 60 mg/mL syringe Physician to inject 1 mL (60 mg total) under the skin every 6 months. For office use only.    empagliflozin (JARDIANCE) 10 mg, oral, Daily    lisinopril 40 mg, oral, Daily    metoprolol succinate XL (TOPROL-XL) 100 mg, oral, Daily, Do not crush or chew.    Trulicity 0.75 mg, subcutaneous, Once Weekly       Physical Exam:  General: Thin elderly  "female  HEENT:  Pupils equal and reactive.  Normocephalic.  Moist mucosa.    Neck:  No thyromegaly.    Cardiovascular:  Regular rate and rhythm.  Normal S1 and S2.  Pulmonary:  Clear to auscultation bilaterally.  Abdomen:  Soft. Non-tender.   Non-distended.  Positive bowel sounds.  Lower Extremities:  2+ pedal pulses. No LE edema.  Neurologic:  Cranial nerves intact.  No focal deficit.   Skin: Skin warm and dry, normal skin turgor.   Psychiatric: Normal affect.     Assessment/Plan   Saige Yates is a 73 y.o. female with history of CVA 2021, diabetes, hypertension, hyperlipidemia, CKD stage 3, left sided breast cancer s/p lumpectomy and radiation in 2015, polycythemia vera , hyperandrogenism presented with complaints of dizziness, right upper extremity weakness, found with subacute CVA and recent 40 lb weight loss.  Found with acute left cerebral infarct-> neurology was consulted on admission.  Cardiology was consulted for \"abnormal echo with reduced EF\"    Prior home cardiology meds include amlodipine 10 mg daily, atorvastatin 20 mg daily, ASA 81mg daily,  lisinopril 40 mg daily, metoprolol succinate 100 mg daily.    TTE 6/13/24    1. Left ventricular systolic function is mildly decreased with a 40-45% estimated ejection fraction.   2. There is global hypokinesis of the left ventricle with minor regional variations.   3. Spectral Doppler shows an abnormal pattern of left ventricular diastolic filling.   4. There is asymmetric left ventricular hypertrophy.   5. Slightly elevated RVSP.   6. Left ventricular cavity size is decreased.    #New HEFmREF 40-45%- WWP exam, looks euvolemic on exam   #History of hypertension-elevated-permissive hypertension allowed now   #Hyperlipidemia- on statin   #Ascending aortic aneurysm measuring 4cm-outpatient surveillance    RECS:   -Possible out patient cardiac MRI to rule hypertropic cardiomyopathy   -Would maintain same outpatient medication regime when neuro or primary sees fit " to re institute   -follow up with Dr. Forte in 6-8 weeks     Code Status:  Full Code    I spent 45 minutes in the professional and overall care of this patient.        July Henning, DIETER-CNP

## 2024-06-14 NOTE — PROGRESS NOTES
Nikole Yates is a 73 y.o. female on day 2 of admission presenting with Cerebrovascular accident (CVA), unspecified mechanism (Multi).      Subjective   Patient seen and examined at the bedside this morning. No acute overnight events. No other questions or concerns.         Objective     Last Recorded Vitals  BP (!) 160/109 (BP Location: Left arm, Patient Position: Sitting)   Pulse 86   Temp 36.8 °C (98.2 °F) (Temporal)   Resp 18   Wt 49.9 kg (110 lb)   SpO2 96%   Intake/Output last 3 Shifts:  No intake or output data in the 24 hours ending 06/14/24 0927    Admission Weight  Weight: 49.9 kg (110 lb) (06/12/24 1059)    Daily Weight  06/12/24 : 49.9 kg (110 lb)    Image Results  Transthoracic Echo (TTE) Complete     [facility], [address] [place] [address]               Tel [phone] and Fax [phone]    TRANSTHORACIC ECHOCARDIOGRAM REPORT       Patient Name:      NIKOLE YATES       Reading Physician:    [id number] Plasencia DO  Study Date:        6/13/2024            Ordering Provider:    [id number] ROBERT VINCENT  MRN/PID:           [id number]             Fellow:  Accession#:        [id number]         Nurse:  Date of Birth/Age: 1950 / 73      Sonographer:          Jose Garcia RDCS                     years  Gender:            F                    Additional Staff:  Height:            165.00 cm            Admit Date:  Weight:            49.90 kg             Admission Status:     Inpatient -                                                                Routine  BSA / BMI:         1.53 m2 / 18.33      Encounter#:           [id number]                     kg/m2                                          Department Location:  [facility]                                                                Invasive  Blood Pressure: 136 /102 mmHg    Study Type:    TRANSTHORACIC ECHO (TTE) COMPLETE  Diagnosis/ICD: Other  cerebrovascular disease-I67.89  Indication:    Cerebrovascular Accident  CPT Code:      Echo Complete w Full Doppler-[id number]    Patient History:  Pertinent History: Hyperlipidemia and HTN.    Study Detail: The following Echo studies were performed: 2D, M-Mode, Doppler and                color flow. Technically challenging study due to poor acoustic                windows and patient lying in supine position.       PHYSICIAN INTERPRETATION:  Left Ventricle: The left ventricular systolic function is mildly decreased, with an estimated ejection fraction of 40-45%. The calculated ejection fraction is mildly decreased at 41 % using the Carlin's Bi-plane MOD calculation. There is global hypokinesis of the left ventricle with minor regional variations. The left ventricular cavity size is decreased. The left ventricular septal wall thickness is severely increased. There is asymmetric left ventricular hypertrophy involving the septal wall. Spectral Doppler shows an abnormal pattern of left ventricular diastolic filling.  Left Atrium: The left atrium is normal in size.  Right Ventricle: The right ventricle is normal in size. There is normal right ventricular global systolic function.  Right Atrium: The right atrium was not well visualized.  Aortic Valve: The aortic valve is trileaflet. There is no evidence of aortic valve regurgitation. The peak instantaneous gradient of the aortic valve is 3.3 mmHg.  Mitral Valve: The mitral valve is mildly thickened. There is no systolic anterior motion. There is trace mitral valve regurgitation.  Tricuspid Valve: The tricuspid valve is structurally normal. There is trace to mild tricuspid regurgitation. The Doppler estimated RVSP is slightly elevated at 36.9 mmHg.  Pulmonic Valve: The pulmonic valve is structurally normal. There is physiologic pulmonic valve regurgitation.  Pericardium: There is no pericardial effusion noted.  Aorta: The aortic root is normal. There is mild dilatation of  the ascending aorta. There is no dilatation of the aortic root.  Systemic Veins: The inferior vena cava appears to be of normal size. There is IVC inspiratory collapse greater than 50%.  In comparison to the previous echocardiogram(s): There are no prior studies on this patient for comparison purposes.       CONCLUSIONS:   1. Left ventricular systolic function is mildly decreased with a 40-45% estimated ejection fraction.   2. There is global hypokinesis of the left ventricle with minor regional variations.   3. Spectral Doppler shows an abnormal pattern of left ventricular diastolic filling.   4. There is asymmetric left ventricular hypertrophy.   5. Slightly elevated RVSP.   6. Left ventricular cavity size is decreased.    QUANTITATIVE DATA SUMMARY:  2D MEASUREMENTS:                           Normal Ranges:  Ao Root d:     3.00 cm   (2.0-3.7cm)  LAs:           2.10 cm   (2.7-4.0cm)  IVSd:          1.78 cm   (0.6-1.1cm)  LVPWd:         1.26 cm   (0.6-1.1cm)  LVIDd:         2.62 cm   (3.9-5.9cm)  LVIDs:         2.26 cm  LV Mass Index: 89.0 g/m2  LV % FS        13.7 %    LA VOLUME:                                Normal Ranges:  LA Vol A4C:        16.6 ml    (22+/-6mL/m2)  LA Vol A2C:        31.9 ml  LA Vol BP:         24.1 ml  LA Vol Index A4C:  10.8ml/m2  LA Vol Index A2C:  20.8 ml/m2  LA Vol Index BP:   15.7 ml/m2  LA Area A4C:       9.0 cm2  LA Area A2C:       13.0 cm2  LA Major Axis A4C: 4.1 cm  LA Major Axis A2C: 4.5 cm  LA Volume Index:   15.7 ml/m2    AORTA MEASUREMENTS:                     Normal Ranges:  Asc Ao, d: 3.45 cm (2.1-3.4cm)    LV SYSTOLIC FUNCTION BY 2D PLANIMETRY (MOD):                      Normal Ranges:  EF-A4C View: 46.5 % (>=55%)  EF-A2C View: 34.7 %  EF-Biplane:  40.9 %    LV DIASTOLIC FUNCTION:                                Normal Ranges:  MV Peak E:        0.24 m/s    (0.7-1.2 m/s)  MV Peak A:        1.26 m/s    (0.42-0.7 m/s)  E/A Ratio:        0.19        (1.0-2.2)  MV lateral e'      0.12 m/s  MV medial e'      0.06 m/s  PulmV Sys Moses:    72.40 cm/s  PulmV Chin Moses:   54.00 cm/s  PulmV S/D Moses:    1.30  PulmV A Revs Moses: 37.30 cm/s  PulmV A Revs Dur: 129.00 msec    MITRAL VALVE:                       Normal Ranges:  MV Vmax:    1.07 m/s (<=1.3m/s)  MV peak P.6 mmHg (<5mmHg)  MV mean P.0 mmHg (<2mmHg)  MV DT:      39 msec  (150-240msec)    AORTIC VALVE:                          Normal Ranges:  AoV Vmax:      0.90 m/s (<=1.7m/s)  AoV Peak PG:   3.3 mmHg (<20mmHg)  LVOT Max Moses:  0.72 m/s (<=1.1m/s)  LVOT VTI:      13.90 cm  LVOT Diameter: 1.90 cm  (1.8-2.4cm)  AoV Area,Vmax: 2.27 cm2 (2.5-4.5cm2)       RIGHT VENTRICLE:  TAPSE: 19.3 mm  RV s'  0.19 m/s    TRICUSPID VALVE/RVSP:                              Normal Ranges:  Peak TR Velocity: 2.91 m/s  RV Syst Pressure: 36.9 mmHg (< 30mmHg)  IVC Diam:         1.70 cm    PULMONIC VALVE:                          Normal Ranges:  PV Accel Time: 90 msec  (>120ms)  PV Max Moses:    0.7 m/s  (0.6-0.9m/s)  PV Max P.0 mmHg    Pulmonary Veins:  PulmV A Revs Dur: 129.00 msec  PulmV A Revs Moses: 37.30 cm/s  PulmV Chin Moses:   54.00 cm/s  PulmV S/D Moses:    1.30  PulmV Sys Moses:    72.40 cm/s       [id number] Sravan Plasencia DO  Electronically signed on 2024 at 4:58:06 PM       ** Final **      Physical Exam  Constitutional:       General: She is not in acute distress.  HENT:      Head: Normocephalic and atraumatic.      Mouth/Throat:      Mouth: Mucous membranes are moist.      Pharynx: Oropharynx is clear.   Eyes:      Conjunctiva/sclera: Conjunctivae normal.      Pupils: Pupils are equal, round, and reactive to light.   Cardiovascular:      Rate and Rhythm: Normal rate and regular rhythm.   Pulmonary:      Effort: Pulmonary effort is normal. No respiratory distress.   Musculoskeletal:      Right lower leg: No edema.      Left lower leg: No edema.   Skin:     General: Skin is warm and dry.   Neurological:      Mental Status: She is alert and oriented to  person, place, and time. Mental status is at baseline.   Psychiatric:         Mood and Affect: Mood normal.         Behavior: Behavior normal.         Relevant Results               Assessment/Plan        Principal Problem:    Cerebrovascular accident (CVA), unspecified mechanism (Multi)  Active Problems:    Dizziness    Mass of head of pancreas (HHS-HCC)    Small, acute left cerebellar infarct:  -Start the patient on aspirin and atorvastatin goal LDL is less than 170 will not treat blood pressure unless it is over systolics of 220.  Continue home medications.      Abnormal Imaging:   -Echocardiogram with global hypokinesia, reduced EF 40-45%, elevated RSVP  -Consult to cardiology, as patient remains inpatient pending MRCP, no outpatient cardiologist. Appreciate recommendations.      Essential Hypertension:  -permissive  -bp in acceptable range     Insulin Dependent Diabetes Mellitus Type 2:   -Continue sliding scale insulin  -Hemoglobin A1c     Pancreatic head fullness:  Unintentional Weight loss:   -Consult GI underwent MRCP today      LOS: Discharge tomorrow, with MRCP read pending.         Prasanna Patel MD

## 2024-06-14 NOTE — PROGRESS NOTES
GI Daily Progress Note    Assessment/Plan:    73 y.o. female with history of CVA, diabetes, hypertension, breast cancer, CKD, presented with complaints of dizziness, right upper extremity weakness, found with subacute CVA.  GI consulted for weight loss, abnormal CT scan showing intrahepatic and extrahepatic biliary dilation, fullness of the head of the pancreas.     -MRCP results pending  -Diet as tolerated  -Potentially can be discharged and follow-up with GI as outpatient for further workup.     LOS: 2 days     Saige Yates is a 73 y.o. female who was admitted with Cerebrovascular accident (CVA), unspecified mechanism (Multi). She reports no new complaints    Subjective:    Patient expresses no new complaints  Patient denies abdominal pain, nausea or vomiting    Objective:    Vital signs in last 24 hours:  Temp:  [36.2 °C (97.2 °F)-37.1 °C (98.7 °F)] 37.1 °C (98.7 °F)  Heart Rate:  [74-86] 84  Resp:  [18] 18  BP: (105-160)/() 105/70    Intake/Output last 3 shifts:  No intake/output data recorded.  Intake/Output this shift:  No intake/output data recorded.    Physical Exam  Constitutional:       Appearance: Normal appearance.   Pulmonary:      Effort: Pulmonary effort is normal.      Breath sounds: Normal breath sounds.   Abdominal:      General: Bowel sounds are normal.      Palpations: Abdomen is soft.   Neurological:      General: No focal deficit present.      Mental Status: She is alert and oriented to person, place, and time.   Psychiatric:         Mood and Affect: Mood normal.         Behavior: Behavior normal.          Results for orders placed or performed during the hospital encounter of 06/12/24 (from the past 24 hour(s))   Cancer Antigen 19-9   Result Value Ref Range    Cancer AG 19-9 23.51 <35.00 U/mL   POCT GLUCOSE   Result Value Ref Range    POCT Glucose 157 (H) 74 - 99 mg/dL   Transthoracic Echo (TTE) Complete   Result Value Ref Range    AV pk isela 0.91 m/s    LVOT diam 1.90 cm    LV Biplane  EF 41 %    MV E/A ratio 0.19     LA vol index A/L 15.7 ml/m2    Tricuspid annular plane systolic excursion 1.9 cm    RV free wall pk S' 18.60 cm/s    LVIDd 2.62 cm    RVSP 36.9 mmHg    Aortic Valve Area by Continuity of Peak Velocity 2.27 cm2    AV pk grad 3.3 mmHg    LV A4C EF 46.5    POCT GLUCOSE   Result Value Ref Range    POCT Glucose 126 (H) 74 - 99 mg/dL   POCT GLUCOSE   Result Value Ref Range    POCT Glucose 224 (H) 74 - 99 mg/dL   POCT GLUCOSE   Result Value Ref Range    POCT Glucose 354 (H) 74 - 99 mg/dL

## 2024-06-15 VITALS
RESPIRATION RATE: 18 BRPM | BODY MASS INDEX: 18.33 KG/M2 | DIASTOLIC BLOOD PRESSURE: 85 MMHG | OXYGEN SATURATION: 97 % | WEIGHT: 110 LBS | TEMPERATURE: 97.5 F | HEART RATE: 85 BPM | HEIGHT: 65 IN | SYSTOLIC BLOOD PRESSURE: 127 MMHG

## 2024-06-15 LAB
ATRIAL RATE: 82 BPM
GLUCOSE BLD MANUAL STRIP-MCNC: 259 MG/DL (ref 74–99)
P AXIS: 56 DEGREES
P OFFSET: 206 MS
P ONSET: 146 MS
PR INTERVAL: 144 MS
Q ONSET: 218 MS
QRS COUNT: 13 BEATS
QRS DURATION: 84 MS
QT INTERVAL: 396 MS
QTC CALCULATION(BAZETT): 462 MS
QTC FREDERICIA: 439 MS
R AXIS: -30 DEGREES
T AXIS: -50 DEGREES
T OFFSET: 416 MS
VENTRICULAR RATE: 82 BPM

## 2024-06-15 PROCEDURE — 2500000001 HC RX 250 WO HCPCS SELF ADMINISTERED DRUGS (ALT 637 FOR MEDICARE OP): Performed by: INTERNAL MEDICINE

## 2024-06-15 PROCEDURE — 99239 HOSP IP/OBS DSCHRG MGMT >30: CPT | Performed by: STUDENT IN AN ORGANIZED HEALTH CARE EDUCATION/TRAINING PROGRAM

## 2024-06-15 PROCEDURE — 2500000001 HC RX 250 WO HCPCS SELF ADMINISTERED DRUGS (ALT 637 FOR MEDICARE OP): Performed by: HOSPITALIST

## 2024-06-15 PROCEDURE — 82947 ASSAY GLUCOSE BLOOD QUANT: CPT

## 2024-06-15 RX ORDER — ATORVASTATIN CALCIUM 40 MG/1
40 TABLET, FILM COATED ORAL NIGHTLY
Qty: 30 TABLET | Refills: 3 | Status: SHIPPED | OUTPATIENT
Start: 2024-06-15

## 2024-06-15 RX ORDER — NAPROXEN SODIUM 220 MG/1
81 TABLET, FILM COATED ORAL DAILY
Qty: 30 TABLET | Refills: 3 | Status: SHIPPED | OUTPATIENT
Start: 2024-06-15

## 2024-06-15 NOTE — DISCHARGE SUMMARY
Discharge Diagnosis  Cerebrovascular accident (CVA), unspecified mechanism (Multi)    Issues Requiring Follow-Up  You will need to follow-up with your primary care physician within 1 week of hospital discharge.    You will also need to follow-up with cardiology and gastroenterology, per instructions provided to you.    Please continue all medications as have been recommended.    Discharge Meds     Your medication list        START taking these medications        Instructions Last Dose Given Next Dose Due   aspirin 81 mg chewable tablet      Chew 1 tablet (81 mg) once daily.              CHANGE how you take these medications        Instructions Last Dose Given Next Dose Due   atorvastatin 40 mg tablet  Commonly known as: Lipitor  What changed:   medication strength  how much to take  when to take this      Take 1 tablet (40 mg) by mouth once daily at bedtime.       Trulicity 0.75 mg/0.5 mL pen injector  Generic drug: dulaglutide  What changed: additional instructions      Inject 0.75 mg under the skin 1 (one) time per week.              CONTINUE taking these medications        Instructions Last Dose Given Next Dose Due   amLODIPine 10 mg tablet  Commonly known as: Norvasc      Take 1 tablet (10 mg) by mouth once daily.       anastrozole 1 mg tablet  Commonly known as: Arimidex           b complex vitamins capsule           calcium carbonate 500 mg calcium (1,250 mg) chewable tablet      Chew 1 tablet (1,250 mg) 2 times a day.       denosumab 60 mg/mL syringe  Commonly known as: Prolia      Physician to inject 1 mL (60 mg total) under the skin every 6 months. For office use only.       empagliflozin 10 mg  Commonly known as: Jardiance      Take 1 tablet (10 mg) by mouth once daily.       lisinopril 40 mg tablet      TAKE 1 TABLET BY MOUTH ONCE  DAILY       metoprolol succinate  mg 24 hr tablet  Commonly known as: Toprol-XL      Take 1 tablet (100 mg) by mouth once daily. Do not crush or chew.                  Where to Get Your Medications        These medications were sent to [id number] - [place], [place] - [address] AT [facility]  [address] [facility] [address], [facility] [address]      Phone: [phone]   aspirin 81 mg chewable tablet  atorvastatin 40 mg tablet         Test Results Pending At Discharge  Pending Labs       Order Current Status    Extra Urine Gray Tube Collected (06/12/24 9342)    Urinalysis with Reflex Culture and Microscopic In process            Hospital Course  Saige Yates is a 73 y.o. female with history of CVA 2021, diabetes, hypertension, hyperlipidemia, CKD stage 3, left sided breast cancer s/p lumpectomy and radiation in 2015, polycythemia vera , hyperandrogenism presented with complaints of dizziness, right upper extremity weakness, found with subacute CVA and recent 40 lb weight loss.  On arrival to the emergency department, patient found to have an acute left cerebral infarction.  Neurology was consulted, and recommendations were provided.  Patient was started on high-dose statin, along with daily aspirin.  Patient's echocardiogram did show mildly reduced ejection fraction, for which cardiology was consulted.  Cardiology has recommended an outpatient cardiac MRI to rule out hypertrophic cardiomyopathy, and continuing current medications.  She has been recommended to follow-up with Dr. Munoz in 6 to 8 weeks as an outpatient.  Patient was also seen by gastroenterology due to concern for possible intrahepatic and extrahepatic biliary ductal dilation, and fullness of the pancreatic head, patient did undergo MRCP.  MRCP did show mild intra and extrahepatic biliary dilation with extrahepatic CBD measuring up to 0.7 without any obstructive pathology being identified.  Patient will be discharged home with close follow-up with primary care physician, and with plan to follow-up with cardiology and gastroenterology in the outpatient setting.    Pertinent Physical Exam At  Time of Discharge  Physical Exam  Constitutional:       General: She is not in acute distress.  HENT:      Head: Normocephalic and atraumatic.      Mouth/Throat:      Mouth: Mucous membranes are moist.      Pharynx: Oropharynx is clear.   Eyes:      Conjunctiva/sclera: Conjunctivae normal.      Pupils: Pupils are equal, round, and reactive to light.   Cardiovascular:      Rate and Rhythm: Normal rate and regular rhythm.   Pulmonary:      Effort: Pulmonary effort is normal.      Breath sounds: Normal breath sounds.   Abdominal:      Palpations: Abdomen is soft.      Tenderness: There is no abdominal tenderness.   Musculoskeletal:      Right lower leg: No edema.      Left lower leg: No edema.   Skin:     General: Skin is warm and dry.   Neurological:      Mental Status: She is alert and oriented to person, place, and time. Mental status is at baseline.   Psychiatric:         Mood and Affect: Mood normal.         Behavior: Behavior normal.         Outpatient Follow-Up  Future Appointments   Date Time Provider Department Center   8/7/2024 10:20 AM Jarad Urbano MD [id number] [facility]   8/12/2024  2:00 PM Osmin Steen PA-C [id number] [facility]   8/30/2024 10:40 AM Scarlett Bangura MD [id number] [facility]   9/5/2024  2:20 PM Dillon Forte MD [id number] [facility]   10/10/2024 10:45 AM Binta Dunne MD [id number] [facility]         Prasanna Patel MD

## 2024-06-15 NOTE — NURSING NOTE
Discharge instructions provided using teachback method.  Patient's health-related risk factors discussed with patient.  Patient educated to look for worsening signs and symptoms and educated to seek medical attention if experiencing medical emergency.  Patient aware of needs to follow up with outpatient clinics as scheduled. Home going meds reviewed with patient.  Patient verbalized understanding of disposition and discharge instructions.  All questions answered to patient's satisfaction and within nursing scope of practice.  Vitals stable; IV(s) removed.  Sons bedside - AVS reviewed with patient and family.

## 2024-06-17 ENCOUNTER — PATIENT OUTREACH (OUTPATIENT)
Dept: CARE COORDINATION | Facility: CLINIC | Age: 74
End: 2024-06-17
Payer: MEDICARE

## 2024-06-18 ENCOUNTER — TELEPHONE (OUTPATIENT)
Dept: HEMATOLOGY/ONCOLOGY | Facility: HOSPITAL | Age: 74
End: 2024-06-18
Payer: MEDICARE

## 2024-06-18 DIAGNOSIS — C50.912 MALIGNANT NEOPLASM OF LEFT FEMALE BREAST, UNSPECIFIED ESTROGEN RECEPTOR STATUS, UNSPECIFIED SITE OF BREAST (MULTI): Primary | ICD-10-CM

## 2024-06-18 DIAGNOSIS — R92.8 ABNORMAL MAMMOGRAM OF LEFT BREAST: ICD-10-CM

## 2024-06-18 NOTE — TELEPHONE ENCOUNTER
VM  left for and Son Noah, and pt Saige-  Call to daughter Jo and  To review left breast imaging requiring diagnostic mammogram and ultrasound. I have placed orders and Instructed to call me with questions

## 2024-06-24 ENCOUNTER — APPOINTMENT (OUTPATIENT)
Dept: RADIOLOGY | Facility: CLINIC | Age: 74
End: 2024-06-24
Payer: MEDICARE

## 2024-06-25 ENCOUNTER — TELEPHONE (OUTPATIENT)
Dept: HEMATOLOGY/ONCOLOGY | Facility: HOSPITAL | Age: 74
End: 2024-06-25
Payer: MEDICARE

## 2024-06-25 NOTE — TELEPHONE ENCOUNTER
"I called Dr. Lemons. Recent hospitalization at  discussed. Pt was \"found down\" at home and brought to [facility] on 6/23-as that was closest hospital. He is able to view minimal records thru [facility]. Patient is not able to provide detailed info and family is not at bedside. He is requesting appt with KIMBERLY Leblanc be moved up from August. Will have contact schedulers to move up that appointment."

## 2024-07-03 ENCOUNTER — SPECIALTY PHARMACY (OUTPATIENT)
Dept: PHARMACY | Facility: CLINIC | Age: 74
End: 2024-07-03

## 2024-07-08 ENCOUNTER — DOCUMENTATION (OUTPATIENT)
Dept: HEMATOLOGY/ONCOLOGY | Facility: HOSPITAL | Age: 74
End: 2024-07-08
Payer: MEDICARE

## 2024-07-08 NOTE — PROGRESS NOTES
Diagnosis Polycythemia. Please see new patient appointment note dated 2/20/24. Patient cancelled appointment that was scheduled for 3/4/24.  Recently in ER for fall and altered mental status. In house at [facility] 6/23/24-6/30/24, diagnosis CVA.   Last labs 6/27/24 IN [facility] RBC 5.38, HGB 15.4, Hematocrit 47.5,    Patient's daughter Jo left a message with detailed information for appointment scheduled on 7/22/24 with PONCHO Steen PA-C. Call [phone] to cancel or reschedule this appointment.

## 2024-07-09 ENCOUNTER — DOCUMENTATION (OUTPATIENT)
Dept: CARE COORDINATION | Facility: CLINIC | Age: 74
End: 2024-07-09
Payer: MEDICARE

## 2024-07-09 DIAGNOSIS — I67.2 INTRACRANIAL ATHEROSCLEROSIS: ICD-10-CM

## 2024-07-09 RX ORDER — METOPROLOL SUCCINATE 100 MG/1
100 TABLET, EXTENDED RELEASE ORAL DAILY
Qty: 90 TABLET | Refills: 0 | Status: SHIPPED | OUTPATIENT
Start: 2024-07-09

## 2024-07-17 ENCOUNTER — HOME HEALTH ADMISSION (OUTPATIENT)
Dept: HOME HEALTH SERVICES | Facility: HOME HEALTH | Age: 74
End: 2024-07-17
Payer: MEDICARE

## 2024-07-22 ENCOUNTER — TELEPHONE (OUTPATIENT)
Dept: HOME HEALTH SERVICES | Facility: HOME HEALTH | Age: 74
End: 2024-07-22
Payer: MEDICARE

## 2024-07-22 ENCOUNTER — APPOINTMENT (OUTPATIENT)
Dept: HEMATOLOGY/ONCOLOGY | Facility: CLINIC | Age: 74
End: 2024-07-22
Payer: MEDICARE

## 2024-07-22 NOTE — TELEPHONE ENCOUNTER
Hello,    Your recent home care referral for Saige Yates has been made a Non Admit with  Home Care due to Inability to Contact Patient. If you have further questions, feel free to reach out to our office at [phone].     Thank you,   [facility]

## 2024-07-31 ENCOUNTER — OFFICE VISIT (OUTPATIENT)
Dept: CARDIOLOGY | Facility: HOSPITAL | Age: 74
End: 2024-07-31
Payer: MEDICARE

## 2024-07-31 VITALS
OXYGEN SATURATION: 96 % | BODY MASS INDEX: 18.95 KG/M2 | HEART RATE: 95 BPM | HEIGHT: 64 IN | SYSTOLIC BLOOD PRESSURE: 144 MMHG | DIASTOLIC BLOOD PRESSURE: 101 MMHG | WEIGHT: 111 LBS

## 2024-07-31 DIAGNOSIS — I77.810 ASCENDING AORTA DILATATION (CMS-HCC): Chronic | ICD-10-CM

## 2024-07-31 DIAGNOSIS — I10 ESSENTIAL HYPERTENSION: Chronic | ICD-10-CM

## 2024-07-31 DIAGNOSIS — E78.5 HYPERLIPIDEMIA, UNSPECIFIED HYPERLIPIDEMIA TYPE: Chronic | ICD-10-CM

## 2024-07-31 DIAGNOSIS — Z86.79 HISTORY OF CARDIOMYOPATHY: Primary | Chronic | ICD-10-CM

## 2024-07-31 PROCEDURE — 1159F MED LIST DOCD IN RCRD: CPT | Performed by: INTERNAL MEDICINE

## 2024-07-31 PROCEDURE — 1036F TOBACCO NON-USER: CPT | Performed by: INTERNAL MEDICINE

## 2024-07-31 PROCEDURE — 99214 OFFICE O/P EST MOD 30 MIN: CPT | Performed by: INTERNAL MEDICINE

## 2024-07-31 PROCEDURE — 3077F SYST BP >= 140 MM HG: CPT | Performed by: INTERNAL MEDICINE

## 2024-07-31 PROCEDURE — 4010F ACE/ARB THERAPY RXD/TAKEN: CPT | Performed by: INTERNAL MEDICINE

## 2024-07-31 PROCEDURE — 3048F LDL-C <100 MG/DL: CPT | Performed by: INTERNAL MEDICINE

## 2024-07-31 PROCEDURE — G2211 COMPLEX E/M VISIT ADD ON: HCPCS | Performed by: INTERNAL MEDICINE

## 2024-07-31 PROCEDURE — 3062F POS MACROALBUMINURIA REV: CPT | Performed by: INTERNAL MEDICINE

## 2024-07-31 PROCEDURE — 3080F DIAST BP >= 90 MM HG: CPT | Performed by: INTERNAL MEDICINE

## 2024-07-31 PROCEDURE — 3052F HG A1C>EQUAL 8.0%<EQUAL 9.0%: CPT | Performed by: INTERNAL MEDICINE

## 2024-07-31 ASSESSMENT — ENCOUNTER SYMPTOMS
LOSS OF SENSATION IN FEET: 0
DEPRESSION: 0
OCCASIONAL FEELINGS OF UNSTEADINESS: 0

## 2024-07-31 NOTE — PROGRESS NOTES
"Chief Complaint:   No chief complaint on file.     History Of Present Illness:    Saige Yates is a 73 y.o. female here for followup. She has a history of diabetes mellitus type II, hypertension, hyperlipidemia, CVA, breast cancer s/p lumpectomy and radiation '15, hyperandrogenism, CKD stage III, and polycythemia vera. She was hospitalized 6/2024 with an apparent stroke. Her ejection fraction was noted to be reduced. She was discharged on adjusted therapies but had a second admission at an OSH for altered mental status and apparent recurrent stroke.       She reports no complaints. She denies cardiovascular symptoms. Brings a log of her home BP's with noted pressures typically in the 100 teens to 120's systolic.       Last Recorded Vitals:  Vitals:    07/31/24 1021   Weight: 50.3 kg (111 lb)   Height: 1.626 m (5' 4\")             Allergies:  Other and Penicillins    Outpatient Medications:  Current Outpatient Medications   Medication Instructions    amLODIPine (NORVASC) 10 mg, oral, Daily    anastrozole (ARIMIDEX) 1 mg, oral, Daily, Swallow whole with a drink of water.    aspirin 81 mg, oral, Daily    atorvastatin (LIPITOR) 40 mg, oral, Nightly    b complex vitamins capsule 1 tablet, oral, Daily    calcium carbonate 1,250 mg, oral, 2 times daily    denosumab (Prolia) 60 mg/mL syringe Physician to inject 1 mL (60 mg total) under the skin every 6 months. For office use only.    empagliflozin (JARDIANCE) 10 mg, oral, Daily    lisinopril 40 mg, oral, Daily    metoprolol succinate XL (TOPROL-XL) 100 mg, oral, Daily, DO NOT CRUSH OR CHEW    Trulicity 0.75 mg, subcutaneous, Once Weekly         Physical Exam:  Gen Well appearing septuagenarian female sitting up in NAD. Body mass index is 19.05 kg/m².   CV rrr. No m/r/g appreciated. No JVD or leg edema.  Pulm Lungs clear with normal respiratory effort.  Neuro Alert and conversant. Grossly nonfocal.       I reviewed most recent imaging / labs / and office notes as well as " details of any recent hospitalizations or ED visits.    Assessment/Plan   1.  History of cardiomyopathy  EF normalized by last TTE. Her present regimen is to continue.     2. Ascending aortic aneurysm  For reassessment 6/2025 via MRA (no CTA given her CKD). BP management as above. On statin.    3. Hypertension   BP suboptimal today but well controlled with home checks (gets bilateral BP checks at home). Her present regimen is to continue.     4. Hyperlipidemia   On statin. Last LDL acceptable.        Followup 1 year        Dillon Forte MD

## 2024-08-01 ENCOUNTER — SPECIALTY PHARMACY (OUTPATIENT)
Dept: PHARMACY | Facility: CLINIC | Age: 74
End: 2024-08-01

## 2024-08-06 NOTE — PROGRESS NOTES
Nephrology Outpatient New Patient Visit Note    Chief Concern:  CKD    History Of Present Illness  Saige Yates is a 73 y.o. female with a history of CKD stage 3b, diabetes mellitus type II , hypertension, hyperlipidemia, CVA, breast cancer s/p lumpectomy and radiation '15, hyperandrogenism, polycythemia vera and recent hospital discharge after acute to subacute infarcts within the left superior frontal gyrus without mass effect or hemorrhagic conversion.   - Bscr since at least 2018 1.2/eGFR 45 till 2020, since then ~1.5-1.6/eGFR 35  Recent bland UA with +2 prot  High calcium level taking Ca carbonate   - on jardiance and lisinopril among others  - no complaints, came out of acute rehab not long ago  - unsure about BP at home but SBP seems >130s      Kidney US 2/2023  RIGHT KIDNEY:   The right kidney measures 9.9 cm in length. The renal cortical   echogenicity is within normal limits. No hydronephrosis is present.   No evidence of nephrolithiasis. Multiple renal cysts are noted,   largest measuring up to 2.9 x 2.9 x 2.9 cm within the midpole..   LEFT KIDNEY:   The left kidney measures 10.0 cm in length. The renal cortical   echogenicity is within normal limits. No hydronephrosis is present.   No evidence of nephrolithiasis.Septated midpole cyst measuring up to   4.5 x 3.9 x 3.5 cm.. This measured up to 3.1 cm on 09/08/2017.     Past Medical History  She has a past medical history of Abnormal findings on diagnostic imaging of other specified body structures (12/08/2018), Abnormal levels of other serum enzymes (01/14/2017), Body mass index (BMI) 21.0-21.9, adult (11/24/2021), Body mass index (BMI) 22.0-22.9, adult (06/06/2022), Body mass index (BMI) 23.0-23.9, adult (07/07/2021), Body mass index (BMI) 24.0-24.9, adult (09/06/2019), Encounter for therapeutic drug level monitoring (03/12/2018), Essential (primary) hypertension (08/24/2017), Other cerebral infarction due to occlusion or stenosis of small artery  (Multi) (04/08/2019), Other conditions influencing health status (01/22/2018), Other specified disorders of breast (04/26/2016), Personal history of diseases of the skin and subcutaneous tissue (11/23/2013), Personal history of other specified conditions (11/07/2015), Personal history of transient ischemic attack (TIA), and cerebral infarction without residual deficits (12/07/2019), and Unspecified lump in the left breast, unspecified quadrant (08/24/2017).  Patient Active Problem List   Diagnosis    Vitamin D deficiency    Diabetes mellitus (Multi)    Status post parathyroidectomy (CMS/[facility])    Serum calcium elevated    Restrictive lung disease    Hyperparathyroidism (Multi)    Polycythemia    Peripheral neuropathy    Osteoporosis    Osteopenia    Mild cognitive impairment    Memory loss    Malignant neoplasm of breast (Multi)    Intracranial atherosclerosis    Hypokalemia    Hyperlipidemia    Hyperandrogenism    History of hypercalcemia    Hirsutism    Gout    Fibroids    Essential hypertension    Elevated testosterone level in female    Elevated homocysteine    Dorsodynia    CVA (cerebral vascular accident) (Multi)    Chronic renal insufficiency    Chronic renal impairment, stage 3b (Multi)    Back pain    Acquired absence of breast and nipple, left    Abnormal MRI of the head    Abnormal kidney function    History of transient cerebral ischemia    Protein-calorie malnutrition, unspecified severity (Multi)    Aromatase inhibitor use    Encounter for follow-up surveillance of breast cancer    Confusion    Weakness    Other fatigue    Polycythemia vera (Multi)    Cerebrovascular accident (CVA), unspecified mechanism (Multi)    Dizziness    Mass of head of pancreas (HHS-HCC)    History of cardiomyopathy    Ascending aorta dilatation (CMS-HCC)       Surgical History  She has a past surgical history that includes Gallbladder surgery (02/09/2015); Other surgical history (04/23/2018); Colonoscopy (05/20/2017); Other  "surgical history (04/27/2015); [place] lymph node biopsy (04/27/2015); Breast biopsy (02/26/2015); MR angio head wo IV contrast (11/12/2021); and Breast lumpectomy (Left).     Social History  She reports that she has never smoked. She has never used smokeless tobacco. She reports current alcohol use. She reports that she does not use drugs.    Family History  Family History   Problem Relation Name Age of Onset    Other (cardiovascular disorder) Father      Stroke Father      Arthritis Sister          Allergies  Other and Penicillins    Review of Systems  A 12-point review of systems was performed and noted be negative except for that which was mentioned in the history of present illness     Last Recorded Vitals  /90   Pulse 90   Temp 36.7 °C (98.1 °F) (Temporal)   Ht 1.651 m (5' 5\")   Wt 49.9 kg (110 lb)   SpO2 95%   BMI 18.30 kg/m²      Physical Exam:  Constitutional:  No acute distress  Respiration:  Breathing comfortably, no stridor  Cardiovascular:  No clubbing/cyanosis/edema in hands  Eyes:  EOM intact, sclera normal  Neuro:  Alert and oriented times 3, Cranial nerves II-XII grossly intact and symmetric bilaterally. No asterixis  Head and Face:  Symmetric facial features, no masses or lesions, sinuses non-tender to palpation  Neck/Lymph:  No LAD, no thyroid masses, trachea midline  Skin:  skin is without visible rash  Psych:  Alert and oriented with appropriate mood and affect      Medications:  Medication Documentation Review Audit       Reviewed by Lexy Kelly RN (Registered Nurse) on 07/31/24 at 1027      Medication Order Taking? Sig Documenting Provider Last Dose Status   amLODIPine (Norvasc) 10 mg tablet [id number] Yes Take 1 tablet (10 mg) by mouth once daily.   Patient taking differently: Take 2.5 mg by mouth once daily.    Scarlett Bangura MD Taking Differently Active   anastrozole (Arimidex) 1 mg tablet [id number] No Take 1 tablet (1 mg total) by mouth once daily.  Swallow whole " with a drink of water. Historical Provider, MD Not Taking Active   aspirin 81 mg chewable tablet [id number] Yes Chew 1 tablet (81 mg) once daily. Prasanna Patel MD Taking Active   atorvastatin (Lipitor) 40 mg tablet [id number] Yes Take 1 tablet (40 mg) by mouth once daily at bedtime.   Patient taking differently: Take 0.5 tablets (20 mg) by mouth once daily at bedtime.    Prasanna Patel MD Taking Differently Active   b complex vitamins capsule [id number] Yes Take 1 tablet by mouth once daily. Historical Provider, MD Taking Active   calcium carbonate 500 mg calcium (1,250 mg) chewable tablet [id number] Yes Chew 1 tablet (1,250 mg) 2 times a day. Binta Dunne MD Taking Active   denosumab (Prolia) 60 mg/mL syringe [id number] No Physician to inject 1 mL (60 mg total) under the skin every 6 months. For office use only.   Patient not taking: Reported on 7/31/2024    Binta Dunne MD Not Taking Active   dulaglutide (Trulicity) 0.75 mg/0.5 mL pen injector [id number] Yes Inject 0.75 mg under the skin 1 (one) time per week. Binta Dunne MD Taking Active   empagliflozin (Jardiance) 10 mg [id number] Yes Take 1 tablet (10 mg) by mouth once daily. Binta Dunne MD Taking Active   lisinopril 40 mg tablet [id number] Yes TAKE 1 TABLET BY MOUTH ONCE  DAILY Scarlett Bangura MD Taking Active   metoprolol succinate XL (Toprol-XL) 100 mg 24 hr tablet [id number] Yes TAKE 1 TABLET BY MOUTH ONCE  DAILY DO NOT CRUSH OR CHEW Poppy Baca, DO Taking Active                    Relevant Results Recent labs reviewed in the EMR.  Lab Results   Component Value Date    HGB 16.1 (H) 06/13/2024    HGB 17.0 (H) 06/12/2024    HGB 14.4 12/28/2023    MCV 89 06/13/2024    MCV 94 06/12/2024    MCV 87 12/28/2023     06/13/2024     06/12/2024     12/28/2023       Lab Results   Component Value Date    FERRITIN 27 05/23/2022    IRON 43 05/23/2022       Lab Results   Component Value Date     06/13/2024    K 3.8  06/13/2024     (H) 06/13/2024    BUN 36 (H) 06/13/2024    CREATININE 1.55 (H) 06/13/2024         Imaging:  I personally reviewed and this is my impression:        Assessment/Plan   1. Polycythemia  - Referral to Nephrology    2. Chronic renal impairment, stage 3 (moderate), unspecified whether stage 3a or 3b CKD (Multi)  - long standing CKD, stable Scr for the last 4 years, due to HTN/DM and aging?, will get basic work up, urine studies. Already on RASi+SGLT2i, not using NSAID  - told to stop Ca supplement   - Referral to Nephrology  - Albumin-Creatinine Ratio, Urine Random; Future  - Protein, Urine Random; Future  - Serum Protein Electrophoresis + Immunofixation; Future  - Uric Acid; Future  - Vitamin D 25-Hydroxy,Total (for eval of Vitamin D levels); Future  - Parathyroid Hormone, Intact; Future  - Wright/Lambda Free Light Chain, Serum; Future  - CBC; Future  - Renal Function Panel; Future  - Urinalysis with Reflex Microscopic; Future  - Follow Up In Nephrology; Future    3. Essential hypertension  - not at target?, will bring BP log next visit, target SBP ~120s  - Follow Up In Nephrology; Future     - RTO 2 months  Jarad Urbano MD  Division of Nephrology and Hypertension

## 2024-08-07 ENCOUNTER — APPOINTMENT (OUTPATIENT)
Dept: NEPHROLOGY | Facility: CLINIC | Age: 74
End: 2024-08-07
Payer: MEDICARE

## 2024-08-07 VITALS
BODY MASS INDEX: 18.33 KG/M2 | HEIGHT: 65 IN | DIASTOLIC BLOOD PRESSURE: 90 MMHG | SYSTOLIC BLOOD PRESSURE: 132 MMHG | WEIGHT: 110 LBS | HEART RATE: 90 BPM | OXYGEN SATURATION: 95 % | TEMPERATURE: 98.1 F

## 2024-08-07 DIAGNOSIS — N18.30 CHRONIC RENAL IMPAIRMENT, STAGE 3 (MODERATE), UNSPECIFIED WHETHER STAGE 3A OR 3B CKD (MULTI): Primary | ICD-10-CM

## 2024-08-07 DIAGNOSIS — D75.1 POLYCYTHEMIA: ICD-10-CM

## 2024-08-07 DIAGNOSIS — I10 ESSENTIAL HYPERTENSION: ICD-10-CM

## 2024-08-07 PROCEDURE — 3048F LDL-C <100 MG/DL: CPT | Performed by: INTERNAL MEDICINE

## 2024-08-07 PROCEDURE — 3052F HG A1C>EQUAL 8.0%<EQUAL 9.0%: CPT | Performed by: INTERNAL MEDICINE

## 2024-08-07 PROCEDURE — 3080F DIAST BP >= 90 MM HG: CPT | Performed by: INTERNAL MEDICINE

## 2024-08-07 PROCEDURE — 4010F ACE/ARB THERAPY RXD/TAKEN: CPT | Performed by: INTERNAL MEDICINE

## 2024-08-07 PROCEDURE — 1159F MED LIST DOCD IN RCRD: CPT | Performed by: INTERNAL MEDICINE

## 2024-08-07 PROCEDURE — 3062F POS MACROALBUMINURIA REV: CPT | Performed by: INTERNAL MEDICINE

## 2024-08-07 PROCEDURE — 3075F SYST BP GE 130 - 139MM HG: CPT | Performed by: INTERNAL MEDICINE

## 2024-08-07 PROCEDURE — 99204 OFFICE O/P NEW MOD 45 MIN: CPT | Performed by: INTERNAL MEDICINE

## 2024-08-12 ENCOUNTER — LAB (OUTPATIENT)
Dept: LAB | Facility: CLINIC | Age: 74
End: 2024-08-12
Payer: MEDICARE

## 2024-08-12 ENCOUNTER — OFFICE VISIT (OUTPATIENT)
Dept: HEMATOLOGY/ONCOLOGY | Facility: CLINIC | Age: 74
End: 2024-08-12
Payer: MEDICARE

## 2024-08-12 ENCOUNTER — APPOINTMENT (OUTPATIENT)
Dept: HEMATOLOGY/ONCOLOGY | Facility: CLINIC | Age: 74
End: 2024-08-12
Payer: MEDICARE

## 2024-08-12 VITALS
TEMPERATURE: 98.1 F | SYSTOLIC BLOOD PRESSURE: 141 MMHG | OXYGEN SATURATION: 99 % | RESPIRATION RATE: 18 BRPM | HEART RATE: 85 BPM | DIASTOLIC BLOOD PRESSURE: 90 MMHG | BODY MASS INDEX: 18.64 KG/M2 | WEIGHT: 112 LBS

## 2024-08-12 DIAGNOSIS — D75.1 SECONDARY POLYCYTHEMIA: ICD-10-CM

## 2024-08-12 DIAGNOSIS — D75.1 SECONDARY POLYCYTHEMIA: Primary | ICD-10-CM

## 2024-08-12 DIAGNOSIS — E61.1 IRON DEFICIENCY: ICD-10-CM

## 2024-08-12 DIAGNOSIS — N18.30 CHRONIC RENAL IMPAIRMENT, STAGE 3 (MODERATE), UNSPECIFIED WHETHER STAGE 3A OR 3B CKD (MULTI): ICD-10-CM

## 2024-08-12 LAB
BASOPHILS # BLD AUTO: 0.03 X10*3/UL (ref 0–0.1)
BASOPHILS NFR BLD AUTO: 0.5 %
EOSINOPHIL # BLD AUTO: 0.26 X10*3/UL (ref 0–0.4)
EOSINOPHIL NFR BLD AUTO: 4.2 %
ERYTHROCYTE [DISTWIDTH] IN BLOOD BY AUTOMATED COUNT: 14.1 % (ref 11.5–14.5)
HCT VFR BLD AUTO: 48.3 % (ref 36–46)
HGB BLD-MCNC: 15.3 G/DL (ref 12–16)
IMM GRANULOCYTES # BLD AUTO: 0.01 X10*3/UL (ref 0–0.5)
IMM GRANULOCYTES NFR BLD AUTO: 0.2 % (ref 0–0.9)
LYMPHOCYTES # BLD AUTO: 1.75 X10*3/UL (ref 0.8–3)
LYMPHOCYTES NFR BLD AUTO: 28.5 %
MCH RBC QN AUTO: 29.7 PG (ref 26–34)
MCHC RBC AUTO-ENTMCNC: 31.7 G/DL (ref 32–36)
MCV RBC AUTO: 94 FL (ref 80–100)
MONOCYTES # BLD AUTO: 0.63 X10*3/UL (ref 0.05–0.8)
MONOCYTES NFR BLD AUTO: 10.3 %
NEUTROPHILS # BLD AUTO: 3.46 X10*3/UL (ref 1.6–5.5)
NEUTROPHILS NFR BLD AUTO: 56.3 %
NRBC BLD-RTO: ABNORMAL /100{WBCS}
PLATELET # BLD AUTO: 282 X10*3/UL (ref 150–450)
PTH-INTACT SERPL-MCNC: 84.9 PG/ML (ref 18.5–88)
RBC # BLD AUTO: 5.16 X10*6/UL (ref 4–5.2)
WBC # BLD AUTO: 6.1 X10*3/UL (ref 4.4–11.3)

## 2024-08-12 PROCEDURE — 3052F HG A1C>EQUAL 8.0%<EQUAL 9.0%: CPT | Performed by: PHYSICIAN ASSISTANT

## 2024-08-12 PROCEDURE — 1126F AMNT PAIN NOTED NONE PRSNT: CPT | Performed by: PHYSICIAN ASSISTANT

## 2024-08-12 PROCEDURE — 82306 VITAMIN D 25 HYDROXY: CPT

## 2024-08-12 PROCEDURE — 36415 COLL VENOUS BLD VENIPUNCTURE: CPT

## 2024-08-12 PROCEDURE — 82728 ASSAY OF FERRITIN: CPT

## 2024-08-12 PROCEDURE — 99214 OFFICE O/P EST MOD 30 MIN: CPT | Performed by: PHYSICIAN ASSISTANT

## 2024-08-12 PROCEDURE — 84550 ASSAY OF BLOOD/URIC ACID: CPT

## 2024-08-12 PROCEDURE — 83970 ASSAY OF PARATHORMONE: CPT

## 2024-08-12 PROCEDURE — 3048F LDL-C <100 MG/DL: CPT | Performed by: PHYSICIAN ASSISTANT

## 2024-08-12 PROCEDURE — 83521 IG LIGHT CHAINS FREE EACH: CPT

## 2024-08-12 PROCEDURE — 86334 IMMUNOFIX E-PHORESIS SERUM: CPT

## 2024-08-12 PROCEDURE — 3080F DIAST BP >= 90 MM HG: CPT | Performed by: PHYSICIAN ASSISTANT

## 2024-08-12 PROCEDURE — 1160F RVW MEDS BY RX/DR IN RCRD: CPT | Performed by: PHYSICIAN ASSISTANT

## 2024-08-12 PROCEDURE — 82607 VITAMIN B-12: CPT

## 2024-08-12 PROCEDURE — 1159F MED LIST DOCD IN RCRD: CPT | Performed by: PHYSICIAN ASSISTANT

## 2024-08-12 PROCEDURE — 80069 RENAL FUNCTION PANEL: CPT

## 2024-08-12 PROCEDURE — 4010F ACE/ARB THERAPY RXD/TAKEN: CPT | Performed by: PHYSICIAN ASSISTANT

## 2024-08-12 PROCEDURE — 3062F POS MACROALBUMINURIA REV: CPT | Performed by: PHYSICIAN ASSISTANT

## 2024-08-12 PROCEDURE — 85025 COMPLETE CBC W/AUTO DIFF WBC: CPT

## 2024-08-12 PROCEDURE — 83540 ASSAY OF IRON: CPT

## 2024-08-12 PROCEDURE — 84155 ASSAY OF PROTEIN SERUM: CPT

## 2024-08-12 PROCEDURE — 82746 ASSAY OF FOLIC ACID SERUM: CPT

## 2024-08-12 PROCEDURE — 3077F SYST BP >= 140 MM HG: CPT | Performed by: PHYSICIAN ASSISTANT

## 2024-08-12 RX ORDER — AMLODIPINE BESYLATE 2.5 MG/1
2.5 TABLET ORAL DAILY
COMMUNITY

## 2024-08-12 RX ORDER — ATORVASTATIN CALCIUM 20 MG/1
20 TABLET, FILM COATED ORAL DAILY
COMMUNITY

## 2024-08-12 ASSESSMENT — PAIN SCALES - GENERAL: PAINLEVEL: 0-NO PAIN

## 2024-08-13 LAB
25(OH)D3 SERPL-MCNC: 41 NG/ML (ref 30–100)
ALBUMIN SERPL BCP-MCNC: 4.2 G/DL (ref 3.4–5)
ANION GAP SERPL CALC-SCNC: 15 MMOL/L (ref 10–20)
BUN SERPL-MCNC: 38 MG/DL (ref 6–23)
CALCIUM SERPL-MCNC: 10.6 MG/DL (ref 8.6–10.6)
CHLORIDE SERPL-SCNC: 104 MMOL/L (ref 98–107)
CO2 SERPL-SCNC: 25 MMOL/L (ref 21–32)
CREAT SERPL-MCNC: 1.56 MG/DL (ref 0.5–1.05)
EGFRCR SERPLBLD CKD-EPI 2021: 35 ML/MIN/1.73M*2
FERRITIN SERPL-MCNC: 42 NG/ML (ref 8–150)
FOLATE SERPL-MCNC: >24 NG/ML
GLUCOSE SERPL-MCNC: 95 MG/DL (ref 74–99)
IRON SATN MFR SERPL: 45 % (ref 25–45)
IRON SERPL-MCNC: 156 UG/DL (ref 35–150)
KAPPA LC SERPL-MCNC: 5.65 MG/DL (ref 0.33–1.94)
KAPPA LC/LAMBDA SER: 1.51 {RATIO} (ref 0.26–1.65)
LAMBDA LC SERPL-MCNC: 3.74 MG/DL (ref 0.57–2.63)
PHOSPHATE SERPL-MCNC: 3.6 MG/DL (ref 2.5–4.9)
POTASSIUM SERPL-SCNC: 4 MMOL/L (ref 3.5–5.3)
PROT SERPL-MCNC: 7.7 G/DL (ref 6.4–8.2)
SODIUM SERPL-SCNC: 140 MMOL/L (ref 136–145)
TIBC SERPL-MCNC: 349 UG/DL (ref 240–445)
UIBC SERPL-MCNC: 193 UG/DL (ref 110–370)
URATE SERPL-MCNC: 6.9 MG/DL (ref 2.3–6.7)
VIT B12 SERPL-MCNC: 1436 PG/ML (ref 211–911)

## 2024-08-14 ENCOUNTER — LAB (OUTPATIENT)
Dept: LAB | Facility: CLINIC | Age: 74
End: 2024-08-14
Payer: MEDICARE

## 2024-08-14 DIAGNOSIS — N18.30 CHRONIC RENAL IMPAIRMENT, STAGE 3 (MODERATE), UNSPECIFIED WHETHER STAGE 3A OR 3B CKD (MULTI): ICD-10-CM

## 2024-08-14 LAB
ALBUMIN: 4 G/DL (ref 3.4–5)
ALPHA 1 GLOBULIN: 0.3 G/DL (ref 0.2–0.6)
ALPHA 2 GLOBULIN: 0.9 G/DL (ref 0.4–1.1)
APPEARANCE UR: CLEAR
BACTERIA #/AREA URNS AUTO: ABNORMAL /HPF
BETA GLOBULIN: 1 G/DL (ref 0.5–1.2)
BILIRUB UR STRIP.AUTO-MCNC: NEGATIVE MG/DL
COLOR UR: ABNORMAL
CREAT UR-MCNC: 36.8 MG/DL (ref 20–320)
CREAT UR-MCNC: 36.8 MG/DL (ref 20–320)
GAMMA GLOBULIN: 1.5 G/DL (ref 0.5–1.4)
GLUCOSE UR STRIP.AUTO-MCNC: ABNORMAL MG/DL
IMMUNOFIXATION COMMENT: ABNORMAL
KETONES UR STRIP.AUTO-MCNC: NEGATIVE MG/DL
LEUKOCYTE ESTERASE UR QL STRIP.AUTO: NEGATIVE
MICROALBUMIN UR-MCNC: 707.5 MG/L
MICROALBUMIN/CREAT UR: 1922.6 UG/MG CREAT
NITRITE UR QL STRIP.AUTO: POSITIVE
PATH REVIEW - SERUM IMMUNOFIXATION: ABNORMAL
PATH REVIEW-SERUM PROTEIN ELECTROPHORESIS: ABNORMAL
PH UR STRIP.AUTO: 6 [PH]
PROT UR STRIP.AUTO-MCNC: ABNORMAL MG/DL
PROT UR-ACNC: 97 MG/DL (ref 5–24)
PROT/CREAT UR: 2.64 MG/MG CREAT (ref 0–0.17)
PROTEIN ELECTROPHORESIS COMMENT: ABNORMAL
RBC # UR STRIP.AUTO: NEGATIVE /UL
RBC #/AREA URNS AUTO: ABNORMAL /HPF
SP GR UR STRIP.AUTO: 1.01
SQUAMOUS #/AREA URNS AUTO: ABNORMAL /HPF
UROBILINOGEN UR STRIP.AUTO-MCNC: 0.2 MG/DL
WBC #/AREA URNS AUTO: ABNORMAL /HPF

## 2024-08-14 PROCEDURE — 84156 ASSAY OF PROTEIN URINE: CPT

## 2024-08-14 PROCEDURE — 81003 URINALYSIS AUTO W/O SCOPE: CPT

## 2024-08-14 PROCEDURE — 82043 UR ALBUMIN QUANTITATIVE: CPT

## 2024-08-20 DIAGNOSIS — I10 ESSENTIAL HYPERTENSION: ICD-10-CM

## 2024-08-20 RX ORDER — LISINOPRIL 40 MG/1
40 TABLET ORAL DAILY
Qty: 90 TABLET | Refills: 3 | Status: SHIPPED | OUTPATIENT
Start: 2024-08-20

## 2024-08-30 ENCOUNTER — APPOINTMENT (OUTPATIENT)
Dept: PRIMARY CARE | Facility: CLINIC | Age: 74
End: 2024-08-30
Payer: MEDICARE

## 2024-08-30 VITALS
WEIGHT: 111 LBS | HEART RATE: 84 BPM | DIASTOLIC BLOOD PRESSURE: 90 MMHG | HEIGHT: 65 IN | SYSTOLIC BLOOD PRESSURE: 150 MMHG | BODY MASS INDEX: 18.49 KG/M2

## 2024-08-30 DIAGNOSIS — E78.5 HYPERLIPIDEMIA, UNSPECIFIED HYPERLIPIDEMIA TYPE: ICD-10-CM

## 2024-08-30 DIAGNOSIS — I10 ESSENTIAL HYPERTENSION: Primary | ICD-10-CM

## 2024-08-30 DIAGNOSIS — E46 PROTEIN-CALORIE MALNUTRITION, UNSPECIFIED SEVERITY (MULTI): ICD-10-CM

## 2024-08-30 DIAGNOSIS — N18.30 CHRONIC RENAL IMPAIRMENT, STAGE 3 (MODERATE), UNSPECIFIED WHETHER STAGE 3A OR 3B CKD (MULTI): ICD-10-CM

## 2024-08-30 DIAGNOSIS — R80.9 TYPE 2 DIABETES MELLITUS WITH MICROALBUMINURIA, WITHOUT LONG-TERM CURRENT USE OF INSULIN (MULTI): ICD-10-CM

## 2024-08-30 DIAGNOSIS — I67.2 INTRACRANIAL ATHEROSCLEROSIS: ICD-10-CM

## 2024-08-30 DIAGNOSIS — E11.29 TYPE 2 DIABETES MELLITUS WITH MICROALBUMINURIA, WITHOUT LONG-TERM CURRENT USE OF INSULIN (MULTI): ICD-10-CM

## 2024-08-30 DIAGNOSIS — D75.1 POLYCYTHEMIA: ICD-10-CM

## 2024-08-30 DIAGNOSIS — E88.89 OTHER SPECIFIED METABOLIC DISORDERS (MULTI): ICD-10-CM

## 2024-08-30 DIAGNOSIS — C77.3 SECONDARY AND UNSPECIFIED MALIGNANT NEOPLASM OF AXILLA AND UPPER LIMB LYMPH NODES (MULTI): ICD-10-CM

## 2024-08-30 DIAGNOSIS — N18.32 CHRONIC RENAL IMPAIRMENT, STAGE 3B (MULTI): ICD-10-CM

## 2024-08-30 PROCEDURE — 4010F ACE/ARB THERAPY RXD/TAKEN: CPT | Performed by: INTERNAL MEDICINE

## 2024-08-30 PROCEDURE — 3048F LDL-C <100 MG/DL: CPT | Performed by: INTERNAL MEDICINE

## 2024-08-30 PROCEDURE — 1124F ACP DISCUSS-NO DSCNMKR DOCD: CPT | Performed by: INTERNAL MEDICINE

## 2024-08-30 PROCEDURE — 1036F TOBACCO NON-USER: CPT | Performed by: INTERNAL MEDICINE

## 2024-08-30 PROCEDURE — 3077F SYST BP >= 140 MM HG: CPT | Performed by: INTERNAL MEDICINE

## 2024-08-30 PROCEDURE — G0439 PPPS, SUBSEQ VISIT: HCPCS | Performed by: INTERNAL MEDICINE

## 2024-08-30 PROCEDURE — 99214 OFFICE O/P EST MOD 30 MIN: CPT | Performed by: INTERNAL MEDICINE

## 2024-08-30 PROCEDURE — 99497 ADVNCD CARE PLAN 30 MIN: CPT | Performed by: INTERNAL MEDICINE

## 2024-08-30 PROCEDURE — 1170F FXNL STATUS ASSESSED: CPT | Performed by: INTERNAL MEDICINE

## 2024-08-30 PROCEDURE — 3080F DIAST BP >= 90 MM HG: CPT | Performed by: INTERNAL MEDICINE

## 2024-08-30 PROCEDURE — 1159F MED LIST DOCD IN RCRD: CPT | Performed by: INTERNAL MEDICINE

## 2024-08-30 PROCEDURE — G0136 PR ADM OF SOC DTR ASSESS 5-15 M: HCPCS | Performed by: INTERNAL MEDICINE

## 2024-08-30 PROCEDURE — 3062F POS MACROALBUMINURIA REV: CPT | Performed by: INTERNAL MEDICINE

## 2024-08-30 PROCEDURE — 3052F HG A1C>EQUAL 8.0%<EQUAL 9.0%: CPT | Performed by: INTERNAL MEDICINE

## 2024-08-30 RX ORDER — AMLODIPINE BESYLATE 2.5 MG/1
2.5 TABLET ORAL DAILY
Qty: 90 TABLET | Refills: 1 | Status: SHIPPED | OUTPATIENT
Start: 2024-08-30 | End: 2025-02-26

## 2024-08-30 RX ORDER — METOPROLOL SUCCINATE 100 MG/1
100 TABLET, EXTENDED RELEASE ORAL DAILY
Qty: 90 TABLET | Refills: 1 | Status: SHIPPED | OUTPATIENT
Start: 2024-08-30

## 2024-08-30 ASSESSMENT — ENCOUNTER SYMPTOMS
DIARRHEA: 0
DIFFICULTY URINATING: 0
NAUSEA: 0
HEMATURIA: 0
SHORTNESS OF BREATH: 0
DIZZINESS: 0
FATIGUE: 0
CHILLS: 0
DYSURIA: 0
ABDOMINAL PAIN: 0
FEVER: 0
VOMITING: 0
PALPITATIONS: 0
COUGH: 0
BLOOD IN STOOL: 0

## 2024-08-30 ASSESSMENT — ACTIVITIES OF DAILY LIVING (ADL)
TAKING_MEDICATION: NEEDS ASSISTANCE
DRESSING: INDEPENDENT
BATHING: INDEPENDENT
DOING_HOUSEWORK: TOTAL CARE
GROCERY_SHOPPING: NEEDS ASSISTANCE
MANAGING_FINANCES: NEEDS ASSISTANCE

## 2024-08-30 NOTE — PROGRESS NOTES
Subjective   Patient ID: Saige Yates is a 73 y.o. female who presents for Medicare Annual Wellness Visit Subsequent (Discuss acp).  Saige Yates, a 74 y/o F with PMHX of HTN, HLD, CVA, T2DM, CKD stage 3, gout, polycythemia, left sided breast cancer s/p lumpectomy and radiation in 2015, hyperandrogenism, PHPT, and osteoporosis presented to the clinic for a follow up visit.  Patient feels well overall denies any acute distress or new complaints/concerns.  She was recently hospitalized multiple times in the last year and recently when she was diagnosed with acute CVA.  Since her last discharge, she went to rehab which she enjoyed and helped her with ambulation.  She recently saw cardiology, nephrology, and hematology.  She feels safe at home and lives with her children including her sons and daughter who are taking care of her including her medication/taking her to her appointments food and finances.  She denies any fever chills sweats chest pain shortness of breath palpitation headache visual disturbances syncope nausea vomiting diarrhea melena hematochezia urinary symptoms or leg swelling.  She does check her blood pressure at home and her recordings have been consistent in 120s 130s for systolic over 80s 90s for diastolic.  She mentioned that her kids have been watching her diet and helping her with everything including her medication and taking her to appointments.        Review of Systems   Constitutional:  Negative for chills, fatigue and fever.   Respiratory:  Negative for cough and shortness of breath.    Cardiovascular:  Negative for chest pain, palpitations and leg swelling.   Gastrointestinal:  Negative for abdominal pain, blood in stool, diarrhea, nausea and vomiting.   Genitourinary:  Negative for difficulty urinating, dysuria and hematuria.   Neurological:  Negative for dizziness.       Objective   Physical Exam  Constitutional:       General: She is not in acute distress.  HENT:      Head:  Normocephalic and atraumatic.   Cardiovascular:      Rate and Rhythm: Normal rate.   Pulmonary:      Effort: Pulmonary effort is normal. No respiratory distress.      Breath sounds: No wheezing.   Abdominal:      Palpations: Abdomen is soft.      Tenderness: There is no abdominal tenderness.   Musculoskeletal:         General: No swelling.      Right lower leg: No edema.      Left lower leg: No edema.   Skin:     General: Skin is warm.   Neurological:      Mental Status: She is alert and oriented to person, place, and time.   Psychiatric:         Mood and Affect: Mood normal.         Behavior: Behavior normal.         Thought Content: Thought content normal.         Judgment: Judgment normal.         Assessment/Plan   Problem List Items Addressed This Visit       Polycythemia    Intracranial atherosclerosis    Relevant Medications    metoprolol succinate XL (Toprol-XL) 100 mg 24 hr tablet    Hyperlipidemia    Essential hypertension - Primary    Relevant Medications    amLODIPine (Norvasc) 2.5 mg tablet    Chronic renal insufficiency    Protein-calorie malnutrition, unspecified severity (Multi)     Other Visit Diagnoses       Type 2 diabetes mellitus with microalbuminuria, without long-term current use of insulin (Multi)                 Medicare Wellness Billing Compliance Satisfied    *This is a visual tool to show completion of required items on the day of the visit. Green checks will only appear on the date of visit.    Review all medications by prescribing practitioner or clinical pharmacist (such as prescriptions, OTCs, herbal therapies and supplements) documented in the medical record    Past Medical, Surgical, and Family History reviewed and updated in chart    Tobacco Use Reviewed    Alcohol Use Reviewed    Illicit Drug Use Reviewed    PHQ2/9    Falls in Last Year Reviewed    Home Safety Risk Factors Reviewed    Cognitive Impairment Reviewed    Patient Self Assessment and Health Status    Current  Diet Reviewed    Exercise Frequency    ADL - Hearing Impairment    ADL - Bathing    ADL - Dressing    ADL - Walks in Home    IADL - Managing Finances    IADL - Grocery Shopping    IADL - Taking Medications    IADL - Doing Housework    Saige Yates, a 74 y/o F with PMHX of HTN, HLD, CVA, T2DM, CKD stage 3, gout, polycythemia, left sided breast cancer s/p lumpectomy and radiation in 2015, hyperandrogenism, PHPT, and osteoporosis presented to the clinic for a follow up visit.  Patient feels well overall denies any acute distress or new complaints/concerns.  She was recently hospitalized multiple times in the last year and recently when she was diagnosed with acute CVA.  Since her last discharge, she went to rehab which she enjoyed and helped her with ambulation.  She recently saw cardiology, nephrology, and hematology.  She feels safe at home and lives with her children including her sons and daughter who are taking care of her including her medication/taking her to her appointments food and finances.  She denies any fever chills sweats chest pain shortness of breath palpitation headache visual disturbances syncope nausea vomiting diarrhea melena hematochezia urinary symptoms or leg swelling.  She does check her blood pressure at home and her recordings have been consistent in 120s 130s for systolic over 80s 90s for diastolic.  She mentioned that her kids have been watching her diet and helping her with everything including her medication and taking her to appointments.    # HTN  #Hx of [facility]   -BP today 150/90  -Current Regimen: Amlodipine 2.5 mg every day + Lisinopril 40 mg every day + Metoprolol Succinate  mg every day   -Continue Jardiance   -Follows with Cardiology  -She checks her blood pressure at home and her recordings have been consistent in 120s 130s for systolic over 80s 90s for diastolic.     #HLD  - c/w Atorvastatin 20 mg every day     #CVA  -Continue ASA and Statin   -Plan to see  Neurology outpatient    #Polycythemia  -Follows with Heme/Onc   -Phlebotomy PRN based on blood count     #CKD Stage G3b, A3   -Likely due HTN and DM   -Baseline Cr 1.5   -Follows with Nephrology Dr. Urbano   -Continue Jardiance   -Workup ordered by Nephro, will follow recs   -Dicussed adequate hydration and avoiding Ibuprofen including Advil, Motrin, and Aleve  -Continue Jardiance      #T2DM  -Continue Trulicity and Jardiance   -Discussed lifestyle modification including diet and exercise   -Follows with endocrinology      #PHPT  #Hyperandrogenism   #DM  #Osteoporosis   -Follows with Endocrinology Dr. Judd      #left sided breast cancer s/p lumpectomy and radiation in 2015  -Not taking anastrozole due to concern for hyperandrogenism   -Mammogram and US cancelled due to ED visit after AMS   -Need to reschedule, per Harmony Hayes   -NEED TO RESCHEDULE        Routine Screening:  - Mammogram (6/2023): stable left breast post treatment, no mammographic evidence of malignancy, repeat 6/2024 cancelled need to reschedule imaging including Mammogram and US   - Colonoscopy/Cologuard: due, not completed previously due to inadequate bowel prep need to reschedule  - DEXA Scan: due, not completed previously need to reschedule  -Immunizations: reviewed and discussed        RTC in December

## 2024-08-30 NOTE — PROGRESS NOTES
I reviewed the resident/fellow's documentation and discussed the patient with the resident/fellow. I agree with the resident/fellow's medical decision making as documented in the note.  As the attending physician, I certify that I personally reviewed the patient's history and personally examined the patient to confirm the physical findings described above, and that I reviewed the relevant imaging studies and available reports. I also discussed the differential diagnosis and all of the proposed management plans with the patient and individuals accompanying the patient to this visit. They had the opportunity to ask questions about the proposed management plans and to have those questions answered.     Due to the patient's social risk factors that could influence the diagnosis and treatment of chronic conditions, a separate discussion about these factors was performed during the wellness visit.  The total time spent on discussing these factors was more than 5 minutes, which included the discussions about medical transportation, meal availability, help with medication compliance, adherence to the specific diet and exercise regiment.  ACP discussion complete  ACP documents printed, reviewed and discussed with the patient  More than 16 minutes spent on reviewing and updating patients advance care plans and wishes    Scarlett Bangura MD

## 2024-09-05 ENCOUNTER — APPOINTMENT (OUTPATIENT)
Dept: CARDIOLOGY | Facility: HOSPITAL | Age: 74
End: 2024-09-05
Payer: MEDICARE

## 2024-09-16 RX ORDER — INSULIN GLARGINE 100 [IU]/ML
18 INJECTION, SOLUTION SUBCUTANEOUS EVERY MORNING
COMMUNITY

## 2024-09-16 RX ORDER — INSULIN LISPRO 100 [IU]/ML
2 INJECTION, SOLUTION INTRAVENOUS; SUBCUTANEOUS
COMMUNITY
Start: 2024-07-03

## 2024-09-16 RX ORDER — DAPAGLIFLOZIN 5 MG/1
10 TABLET, FILM COATED ORAL
COMMUNITY
Start: 2024-07-16

## 2024-09-16 RX ORDER — POLYETHYLENE GLYCOL 3350 17 G/17G
17 POWDER, FOR SOLUTION ORAL
COMMUNITY
Start: 2024-07-04

## 2024-09-16 RX ORDER — TALC
3 POWDER (GRAM) TOPICAL DAILY PRN
COMMUNITY
Start: 2024-07-03

## 2024-09-16 RX ORDER — PEN NEEDLE, DIABETIC 31 GX5/16"
NEEDLE, DISPOSABLE MISCELLANEOUS
COMMUNITY
Start: 2024-07-16

## 2024-09-16 RX ORDER — LATANOPROST 50 UG/ML
SOLUTION/ DROPS OPHTHALMIC
COMMUNITY
Start: 2023-11-09

## 2024-09-16 RX ORDER — SENNOSIDES 8.6 MG/1
8.6 TABLET ORAL 2 TIMES DAILY
COMMUNITY
Start: 2024-07-03

## 2024-09-16 RX ORDER — ACETAMINOPHEN 325 MG/1
650 TABLET ORAL EVERY 6 HOURS PRN
COMMUNITY
Start: 2024-07-16

## 2024-09-16 RX ORDER — RISEDRONATE SODIUM 35 MG/1
35 TABLET, FILM COATED ORAL
COMMUNITY
Start: 2022-09-22

## 2024-09-16 RX ORDER — HEPARIN SODIUM 5000 [USP'U]/ML
5000 INJECTION, SOLUTION INTRAVENOUS; SUBCUTANEOUS 2 TIMES DAILY
COMMUNITY
Start: 2024-07-03 | End: 2024-09-16

## 2024-10-01 ENCOUNTER — SPECIALTY PHARMACY (OUTPATIENT)
Dept: PHARMACY | Facility: CLINIC | Age: 74
End: 2024-10-01

## 2024-10-04 NOTE — PROGRESS NOTES
FUV for type 2 DM, PHPT, and osteoporosis. LV with me 01/2024.    History of Present Illness  73 y.o. female  with hx of T2DM, stroke (2021 and 2024), CKD stage 3, HFmrEF, hypertension, polycythemia, left sided breast cancer s/p lumpectomy and radiation in 2015, hyperandrogenism, PHPT, and osteoporosis, here for follow up.    <Hyperandrogenism>  Hx of elevated testosterone levels since 2018 (> 200 ng/dL).  Has some facial hirsutism, mustache mainly, hirsutism has been noticed for years it has not been worsening  Trasnvaginal US (2018): no lesions on right ovary, left ovary was unable to be seen  CT Abd/Plevis (2018): no adrenal lesions  Was recommended TAHBSO by Dr. Ang Cruz as well as myself, but has been declining surgery.  Has polycemia requiring periodic phlebotomy (Hematologist, Dr. Steen)     <PHPT>  Hx of hyperparathyroidism and had parathyroid surgery in 4/2018 (Dr. Pradhan).  L superior parathyroid resected-hypercellular on pathology.  Calcium levels continue to be elevated with non-suppressed PTH.  DXA (2019) : Osteoporosis (distal radius T-score: -2.8)  DXA (04/2022): Osteoporosis (T-score 1/3 distal radius: -2.8, L neck -1.8, Spine +0.2)  Significant decrease in spine BMD per LSC (*L2-L4 compared in 2019 vs L1-L3 in 2022)  On anastrazole since 2015 for breast cancer.  Not on any antiosteoporosis medication.    <Type 2 DM>  Dx: approx 2018  HbA1c: 11.0% (07/2024), 8.1% (03/2024), 6.8% (12/28/2023), 6.6% (09/2023),7.3% (06/2023), 6.6% (2/14/2023), 6.4% (1/23/23). 7.1% (9/6/2022), 6.1% (4/16/2022), 9.8% (11/14/2021), 8.5% (10/2021)  Current regimen: Trulicity 0.75 mg/week, Farxiga 10 mg QDAY, Lantus 18 units at beditme  Past medications: glipizide and tradjenta  Complications: nephropathy  Comorbidities: breast cancer, HTN, TIA, CKD stage 3    SMBG: twice a day  Fasting: variable, 100s to low 200s    Today:  -Admitted to [facility] in June for CVA  -started on insulin while inpatient, discharged on  Yovani PAL  General: no fever or chills  CV: no chest pain   Respiratory: no shortness of breath  MSK: no lower extremity edema  Neuro: no headache or dizziness  See HPI for Endocrine ROS    Past Medical History:   Diagnosis Date    Abnormal findings on diagnostic imaging of other specified body structures 12/08/2018    Abnormal chest CT    Abnormal levels of other serum enzymes 01/14/2017    Elevated liver enzymes    Body mass index (BMI) 21.0-21.9, adult 11/24/2021    BMI 21.0-21.9, adult    Body mass index (BMI) 22.0-22.9, adult 06/06/2022    Body mass index (BMI) of 22.0 to 22.9 in adult    Body mass index (BMI) 23.0-23.9, adult 07/07/2021    BMI 23.0-23.9, adult    Body mass index (BMI) 24.0-24.9, adult 09/06/2019    BMI 24.0-24.9, adult    Encounter for therapeutic drug level monitoring 03/12/2018    Encounter for monitoring anastrozole therapy    Essential (primary) hypertension 08/24/2017    Uncontrolled hypertension    Other cerebral infarction due to occlusion or stenosis of small artery 04/08/2019    Lacunar infarction    Other conditions influencing health status 01/22/2018    Malignant neoplasm of left female breast, unspecified site of breast    Other specified disorders of breast 04/26/2016    Postoperative breast asymmetry    Personal history of diseases of the skin and subcutaneous tissue 11/23/2013    History of atopic dermatitis    Personal history of other specified conditions 11/07/2015    History of chest pain    Personal history of transient ischemic attack (TIA), and cerebral infarction without residual deficits 12/07/2019    History of transient cerebral ischemia    Unspecified lump in the left breast, unspecified quadrant 08/24/2017    Breast mass, left       Past Surgical History:   Procedure Laterality Date    BREAST BIOPSY  02/26/2015    Biopsy Breast Percutaneous Needle Core    BREAST LUMPECTOMY Left     COLONOSCOPY  05/20/2017    Complete Colonoscopy    GALLBLADDER SURGERY   02/09/2015    Gallbladder Surgery    MR HEAD ANGIO WO IV CONTRAST  11/12/2021    MR HEAD ANGIO WO IV CONTRAST 11/12/2021 [facility] CLINICAL LEGACY    OTHER SURGICAL HISTORY  04/23/2018    Parathyroid Resection    OTHER SURGICAL HISTORY  04/27/2015    Left Breast Partial Mastectomy    SENTINEL LYMPH NODE BIOPSY  04/27/2015    [place] Lymph Node Biopsy       Social History     Socioeconomic History    Marital status:      Spouse name: Not on file    Number of children: Not on file    Years of education: Not on file    Highest education level: Not on file   Occupational History    Not on file   Tobacco Use    Smoking status: Never     Passive exposure: Past    Smokeless tobacco: Never   Substance and Sexual Activity    Alcohol use: Yes     Comment: on holidays    Drug use: Never    Sexual activity: Not on file   Other Topics Concern    Not on file   Social History Narrative    Not on file     Social Determinants of Health     Financial Resource Strain: Patient Unable To Answer (7/5/2024)    [facility]    Overall Financial Resource Strain (CARDIA)     Difficulty of Paying Living Expenses: Patient unable to answer   Food Insecurity: Patient Unable To Answer (7/5/2024)    [facility]    Hunger Vital Sign     Worried About Running Out of Food in the Last Year: Patient unable to answer     Ran Out of Food in the Last Year: Patient unable to answer   Transportation Needs: No Transportation Needs (7/15/2024)    Received from [facility] Source     Has lack of transportation kept you from medical appointments or from getting medications?: No     Has lack of transportation kept you from meetings, work, or from getting things needed for daily living?: No   Physical Activity: Not on file   Stress: No Stress Concern Present (7/17/2024)    Received from [facility] [place], operated by [facility] of Occupational Health - Occupational Stress  "Questionnaire     Feeling of Stress : Not at all   Social Connections: Patient Unable To Answer (7/5/2024)    Received from [facility]    Social Connection and Isolation Panel [NHANES]     Frequency of Communication with Friends and Family: Patient unable to answer     Frequency of Social Gatherings with Friends and Family: Patient unable to answer     Attends Taoism Services: Patient unable to answer     Active Member of Clubs or Organizations: Patient unable to answer     Attends Club or Organization Meetings: Patient unable to answer     Marital Status: Patient unable to answer   Intimate Partner Violence: At Risk (7/4/2024)    Received from [facility], [facility]    Domestic Abuse Assessment     Do you feel safe in your relationships at home?: Yes     Physical Abuse: Denies     [facility] Domestic Abuse - Type of Abuse: Not on file     [facility] Domestic Abuse - Time Frame: Not on file     HRSN Domestic Abuse - Signs and Symptoms: Not on file     Verbal Abuse: Denies     Possible abuse reported to:: Other (Comment)   Housing Stability: Patient Unable To Answer (7/5/2024)    Received from [facility], [facility]    Housing Stability Vital Sign     Unable to Pay for Housing in the Last Year: Patient unable to answer     Number of Times Moved in the Last Year: 1     Homeless in the Last Year: Patient unable to answer       Physical Exam   height is 1.651 m (5' 5\") and weight is 52.2 kg (115 lb). Her temporal temperature is 36.4 °C (97.5 °F). Her blood pressure is 150/103 (abnormal) and her pulse is 92.   General: not in acute distress  HEENT: ZANDER GARZON  Thyroid: no goiter  Neuro: alert and oriented x 3    Current Outpatient Medications   Medication Sig Dispense Refill    amLODIPine (Norvasc) 2.5 mg tablet Take 1 tablet (2.5 mg) by mouth once daily. 90 tablet 1    anastrozole (Arimidex) 1 mg tablet Take 1 tablet (1 mg total) by mouth once daily.  Swallow whole with a drink of water.      " "aspirin 81 mg chewable tablet Chew 1 tablet (81 mg) once daily. 30 tablet 3    atorvastatin (Lipitor) 20 mg tablet Take 1 tablet (20 mg) by mouth once daily.      dulaglutide (Trulicity) 0.75 mg/0.5 mL pen injector Inject 0.75 mg under the skin 1 (one) time per week. 6 mL 1    insulin lispro (HumaLOG) 100 unit/mL injection 2 Units 3 times daily (morning, midday, late afternoon).      Lantus Solostar U-100 Insulin 100 unit/mL (3 mL) pen Inject 18 Units under the skin once daily in the morning.      latanoprost (Xalatan) 0.005 % ophthalmic solution       lisinopril 40 mg tablet TAKE 1 TABLET BY MOUTH ONCE  DAILY 90 tablet 3    metoprolol succinate XL (Toprol-XL) 100 mg 24 hr tablet Take 1 tablet (100 mg) by mouth once daily. DO NOT CRUSH OR CHEW 90 tablet 1    acetaminophen (Tylenol) 325 mg tablet Take 2 tablets (650 mg) by mouth every 6 hours if needed.      b complex vitamins capsule Take 1 tablet by mouth once daily.      BD Ultra-Fine Mini Pen Needle 31 gauge x 3/16\" needle USE ONCE DAILY TO INJECT LANTUS      benzocaine-menthoL lozenge Take 1 lozenge by mouth every 2 hours if needed.      dapagliflozin propanediol (Farxiga) 5 mg Take 2 tablets (10 mg) by mouth once daily.      empagliflozin (Jardiance) 10 mg Take 1 tablet (10 mg) by mouth once daily. (Patient not taking: Reported on 10/10/2024) 90 tablet 1    melatonin 3 mg tablet Take 1 tablet (3 mg) by mouth once daily as needed.      polyethylene glycol (Glycolax, Miralax) 17 gram packet Take 17 g by mouth once daily.      risedronate (Actonel) 35 mg tablet Take 1 tablet (35 mg) by mouth every 7 days.      sennosides (Senokot) 8.6 mg tablet 1 tablet (8.6 mg) by Enteral route twice a day.       No current facility-administered medications for this visit.       Assessment and Plan  73 y.o. female with type 2 diabetes, CKD stage 3, HFmrEF, breast CA s/p lumpectomy and RT (2015), anastrazole, PHPT s/p parathyroidectomy with persistent hypercalcemia, osteoporosis, " and hyperandrogenism, and CVA (2021 and 2024), here for follow-up.     1. Type 2 diabetes melltius  HbA1c: 11.0% (07/1/2024), 8.1% (03/2024), 6.8% (12/28/2023), 6.6% (09/2023),7.3% (06/2023), 6.6% (2/14/2023), 6.4% (1/24/23), 7.1% (9/6/2022), 6.1% (4/16/2022, 9.8% (11/14/2021), 8.5% (10/2021), 9.4% (02/2021)  Current regimen: Trulicity 0.75 mg/week, Jardiance 10 mg QDAY  Eye exam: LV 10/2023   Urine microalbumin: +nephropathy; managed by nephrology  Lipids: HDL 70, LDL 71, TG 75 (03/2024) atorvastatin 40 mg    A1c 11.0% in July. IO A1c not available today.  She was hospitalized in July for a stroke.  Discharged on Lantus but she is currently not taking this.  She is also not taking Jardiance. Unclear why.    Son is helping her check her BG every morning. Fasting 110s-150s.  BG appears to have improved compared to while she was inpatient, but it is difficult to tell from just fasting BG levels on how she is doing overall. Will check HbA1c.    Advised her to check BG twice a day to get a better understanding of her overall glycemic control.  Will have her resume Jardiance.    PLAN:  -continue Trulicity 0.75 mg/week  -resume Jardiance 10 mg QDAY  -check blood sugars twice a day (fasting and 2h post dinner)  -bring glucometer to every visit  -advised patient to let me know if BGs < 80    2. Elevated testosterone  3. Polycythemia  Hx of elevated testosterone levels since 2018 (> 200 ng/dL).  DHEAS 217 ug/dL ( ug/dL).  Has facial hirsutism, mustache mainly for years.   Patient does not have worsening of hirsutism.  Voice has been deeper/a little bit hoarse.  No increase in sexual desire.  Transvaginal US (2018): no lesions on right ovary, left ovary was unable to be seen  CT Abd/Plevis (2018): no adrenal lesions  Has polycythemia requiring periodic phlebotomy (follows with Dr. Steen, Hematology)    No adrenal lesion on CT on initial evaluation. The elevated testosterone is likely from an ovarian source: ovarian  hyperthecosis vs ovarian tumor.   Although she is on anastrazole for hx of breast cancer, the elevated testosterone provides more substrate for estrogen production. Estradiol in 05/2022 was 33 pg/mL which is higher than expected for a post-menopausal women on anastrazole.   Although hyperandrogen symptoms have not been worsening, would still recommend BSO for this patient given polycythemia and hx of stroke x 2.  Surgery has been discussed multiple times and the rationale above explained, but she continues to declines.    Labs from 1/24/2023  Total testosterone: 259  Estradiol: 35    Labs from 06/2023  Total testosterone: 243  Hematocrit: 45.6    Labs from 12/28/2023  Hematocrit 46.8    Labs from 03/2024  Total testosterone 282  DHEAS 107  Hematocrit 50.2    Will repeat labs now.    PLAN:  -check total testosterone, DHEAS   -continue phlebotomy per Hematology (Dr. Steen)    4. Primary Hyperparathyroidism  5. Hx of parathyroidectomy  6. Osteoporosis  S/p parathyroidectomy in 4/2018 (Dr. Pradhan-left superior parathyroid; hypercellular on pathology)  Remains hypercalcemic with non-suppressed PTH.    DXA (05/2024): [facility] compared to 04/2022  SPINE L1-L4  Bone Mineral Density: 1.194  T-Score 0.1  Z-Score 1.2  Bone Mineral Density change vs previous: -1.1%  LEFT FEMUR -TOTAL  Bone Mineral Density: 0.736  T-Score -2.2   Z-Score  -1.4  Bone Mineral Density change vs previous: -6.8%*  LEFT FEMUR -NECK  Bone Mineral Density: 0.761  T-Score -2.0  Z-Score -1.0  Bone Mineral Density change vs previous: -4.0%  LEFT FOREARM 1/3  Bone Mineral Density: 0.582  T-Score -3.4   Z-Score -1.8  Bone Mineral Density change vs previous:  -8.9%*    On anastrazole since 2015 for breast cancer.    Current regimen: none  Past treatment: risedronate (had hallucinations).   D3 supplementation: 400 IU  Calcium intake: no supplements, eats yogurt 2-3 times per week    Labs from 5/28/2022  Ca: 11.0  GFR: 44  24H urine calcium: 143  mg/day  Ca/creatinine ratio: 0.022    Labs from 03/2024  Ca 10.8    D25OH 42  eGFR 36    Labs from 08/2024  Ca 10.6  PTH 84.9  D25OH 41  Cre 1.56  eGFR 35    Discussed repeat parathyroidectomy but patient declines.   Calcium is stable.    Currently, kidney function is not adequate for bisphosphonates.  In her case, PTH analogs cannot be used due to PHPT and romosozumab needs to be avoided to her hx of polycythemia and hx of stroke.  Denosumab is an option, however, if she needs to stop denosumab for any reason, transitioning her to an antiresorptive may be difficult.  I am also concerned that she may not be able to keep up with the pre-prolia labs and injections every 6 months.  She was confused about her medications today.  I have discussed Prolia with her at her prior visit. She was hesitant to start.    She had a toot extraction on 10/7. Will hold off on Prolia but will continue to discuss treatment to her osteoporosis.    PLAN:  -continue vitamin D supplementation  -encouraged to keep hydrated  -repeat DXA 05/2026  -Dentist: Dr. Ivette France (phone: #[phone], fax:#[phone])      Follow-up in 3 months  OK to mail results if she cannot be reached.

## 2024-10-07 ENCOUNTER — SPECIALTY PHARMACY (OUTPATIENT)
Dept: PHARMACY | Facility: CLINIC | Age: 74
End: 2024-10-07

## 2024-10-10 ENCOUNTER — APPOINTMENT (OUTPATIENT)
Dept: ENDOCRINOLOGY | Facility: CLINIC | Age: 74
End: 2024-10-10
Payer: MEDICARE

## 2024-10-10 VITALS
BODY MASS INDEX: 19.16 KG/M2 | WEIGHT: 115 LBS | SYSTOLIC BLOOD PRESSURE: 150 MMHG | TEMPERATURE: 97.5 F | HEART RATE: 92 BPM | HEIGHT: 65 IN | DIASTOLIC BLOOD PRESSURE: 103 MMHG

## 2024-10-10 DIAGNOSIS — E11.29 TYPE 2 DIABETES MELLITUS WITH MICROALBUMINURIA, WITHOUT LONG-TERM CURRENT USE OF INSULIN (MULTI): ICD-10-CM

## 2024-10-10 DIAGNOSIS — E55.9 VITAMIN D DEFICIENCY: Primary | ICD-10-CM

## 2024-10-10 DIAGNOSIS — R79.89 ELEVATED TESTOSTERONE LEVEL IN FEMALE: ICD-10-CM

## 2024-10-10 DIAGNOSIS — E21.3 HYPERPARATHYROIDISM (MULTI): ICD-10-CM

## 2024-10-10 DIAGNOSIS — R80.9 TYPE 2 DIABETES MELLITUS WITH MICROALBUMINURIA, WITHOUT LONG-TERM CURRENT USE OF INSULIN (MULTI): ICD-10-CM

## 2024-10-10 PROCEDURE — 1036F TOBACCO NON-USER: CPT | Performed by: STUDENT IN AN ORGANIZED HEALTH CARE EDUCATION/TRAINING PROGRAM

## 2024-10-10 PROCEDURE — 3052F HG A1C>EQUAL 8.0%<EQUAL 9.0%: CPT | Performed by: STUDENT IN AN ORGANIZED HEALTH CARE EDUCATION/TRAINING PROGRAM

## 2024-10-10 PROCEDURE — 1159F MED LIST DOCD IN RCRD: CPT | Performed by: STUDENT IN AN ORGANIZED HEALTH CARE EDUCATION/TRAINING PROGRAM

## 2024-10-10 PROCEDURE — 99215 OFFICE O/P EST HI 40 MIN: CPT | Performed by: STUDENT IN AN ORGANIZED HEALTH CARE EDUCATION/TRAINING PROGRAM

## 2024-10-10 PROCEDURE — 3077F SYST BP >= 140 MM HG: CPT | Performed by: STUDENT IN AN ORGANIZED HEALTH CARE EDUCATION/TRAINING PROGRAM

## 2024-10-10 PROCEDURE — 3062F POS MACROALBUMINURIA REV: CPT | Performed by: STUDENT IN AN ORGANIZED HEALTH CARE EDUCATION/TRAINING PROGRAM

## 2024-10-10 PROCEDURE — 3048F LDL-C <100 MG/DL: CPT | Performed by: STUDENT IN AN ORGANIZED HEALTH CARE EDUCATION/TRAINING PROGRAM

## 2024-10-10 PROCEDURE — 4010F ACE/ARB THERAPY RXD/TAKEN: CPT | Performed by: STUDENT IN AN ORGANIZED HEALTH CARE EDUCATION/TRAINING PROGRAM

## 2024-10-10 PROCEDURE — 3080F DIAST BP >= 90 MM HG: CPT | Performed by: STUDENT IN AN ORGANIZED HEALTH CARE EDUCATION/TRAINING PROGRAM

## 2024-10-10 RX ORDER — DULAGLUTIDE 0.75 MG/.5ML
0.75 INJECTION, SOLUTION SUBCUTANEOUS
Qty: 6 ML | Refills: 3 | Status: SHIPPED | OUTPATIENT
Start: 2024-10-13

## 2024-10-10 NOTE — PATIENT INSTRUCTIONS
<Diabetes Medications>    Continue Trulicity 0.75 mg, once a week  Re-start Jardiance 10 mg 1 tab, once a day  Check blood sugars twice a day (before breakfast and 2 hours after dinner)  Please have blood work done at your earliest convenience

## 2024-10-15 NOTE — PROGRESS NOTES
Nephrology Outpatient Follow-up Patient Note    Chief Concern:  Follow-up for CKD    History Of Present Illness   Saige Yates is a 73 y.o. female with a history of  CKD stage 3b, diabetes mellitus type II , 1ry HPTH s/p parathyroidectomy on 2018 (was partial), hypertension, hyperlipidemia, CVA, breast cancer s/p lumpectomy and radiation '15, hyperandrogenism, polycythemia vera and recent hospital discharge after acute to subacute infarcts within the left superior frontal gyrus without mass effect or hemorrhagic conversion.   - Bscr since at least 2018 1.2/eGFR 45 till 2020, since then ~1.5-1.6/eGFR 35, last labs 8/12/24 Scr 1.56/eGFR 31  Ca 10.6, chronically at this level, last PTH 85  ACR 1922, PCR 2.64  Neg work up, BMD labs OK    - on jardiance and lisinopril among others  Pt offered parathyroidectomy by endocrinology but she is declining   Had discussion about using prolia   Last visit told to stop Ca supplement  - BP at home ~130-150/100s    - no complaints     Past Medical History  She has a past medical history of Abnormal findings on diagnostic imaging of other specified body structures (12/08/2018), Abnormal levels of other serum enzymes (01/14/2017), Body mass index (BMI) 21.0-21.9, adult (11/24/2021), Body mass index (BMI) 22.0-22.9, adult (06/06/2022), Body mass index (BMI) 23.0-23.9, adult (07/07/2021), Body mass index (BMI) 24.0-24.9, adult (09/06/2019), Encounter for therapeutic drug level monitoring (03/12/2018), Essential (primary) hypertension (08/24/2017), Other cerebral infarction due to occlusion or stenosis of small artery (04/08/2019), Other conditions influencing health status (01/22/2018), Other specified disorders of breast (04/26/2016), Personal history of diseases of the skin and subcutaneous tissue (11/23/2013), Personal history of other specified conditions (11/07/2015), Personal history of transient ischemic attack (TIA), and cerebral infarction without residual deficits  (12/07/2019), and Unspecified lump in the left breast, unspecified quadrant (08/24/2017).  Patient Active Problem List   Diagnosis    Vitamin D deficiency    Diabetes mellitus (Multi)    Status post parathyroidectomy (CMS/HCC)    Serum calcium elevated    Restrictive lung disease    Hyperparathyroidism (Multi)    Polycythemia    Peripheral neuropathy    Osteoporosis    Osteopenia    Mild cognitive impairment    Memory loss    Malignant neoplasm of breast    Intracranial atherosclerosis    Hypokalemia    Hyperlipidemia    Hyperandrogenism    History of hypercalcemia    Hirsutism    Gout    Fibroids    Essential hypertension    Elevated testosterone level in female    Elevated homocysteine    Dorsodynia    CVA (cerebral vascular accident) (Multi)    Chronic renal insufficiency    Chronic renal impairment, stage 3b (Multi)    Back pain    Acquired absence of breast and nipple, left    Abnormal MRI of the head    Abnormal kidney function    History of transient cerebral ischemia    Protein-calorie malnutrition, unspecified severity (Multi)    Aromatase inhibitor use    Encounter for follow-up surveillance of breast cancer    Confusion    Weakness    Other fatigue    Polycythemia vera    Cerebrovascular accident (CVA), unspecified mechanism (Multi)    Dizziness    Mass of head of pancreas (HHS-HCC)    History of cardiomyopathy    Ascending aorta dilatation (CMS-HCC)    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     Social History  She reports that she has never smoked. She has been exposed to  tobacco smoke. She has never used smokeless tobacco. She reports current alcohol use. She reports that she does not use drugs.    Family History  Family History   Problem Relation Name Age of Onset    Other (cardiovascular disorder) Father      Stroke Father      Arthritis Sister          Allergies  Other and Penicillins    Review of Systems  A 12-point review of systems was performed and noted be negative except for that which was mentioned in the history of present illness     Last Recorded Vitals  BP (!) 146/100   Pulse 82   Resp 18   Wt 51 kg (112 lb 6.4 oz)   SpO2 95%   BMI 18.70 kg/m²      Physical Exam:  Constitutional:  No acute distress  Respiration:  Breathing comfortably, no stridor  Cardiovascular:  No clubbing/cyanosis/edema in hands  Eyes:  EOM intact, sclera normal  Neuro:  Alert and oriented times 3, Cranial nerves II-XII grossly intact and symmetric bilaterally. No asterixis  Head and Face:  Symmetric facial features, no masses or lesions, sinuses non-tender to palpation  Neck/Lymph:  No LAD, no thyroid masses, trachea midline, No JVD  Skin:  no visible rash or scratching marks   Psych:  Alert and oriented with appropriate mood and affect      Medications:  Medication Documentation Review Audit       Reviewed by Dennise Hair MA (Medical Assistant) on 10/10/24 at 1105      Medication Order Taking? Sig Documenting Provider Last Dose Status   acetaminophen (Tylenol) 325 mg tablet [id number]  Take 2 tablets (650 mg) by mouth every 6 hours if needed. Historical Provider, MD  Active   amLODIPine (Norvasc) 2.5 mg tablet [id number] Yes Take 1 tablet (2.5 mg) by mouth once daily. Azael Morales MD Taking Active   anastrozole (Arimidex) 1 mg tablet [id number] Yes Take 1 tablet (1 mg total) by mouth once daily.  Swallow whole with a drink of water. Historical Provider, MD Taking Active   aspirin 81 mg chewable tablet [id number] Yes Chew 1 tablet (81 mg) once daily. Prasanna Patel MD Taking Active  "  atorvastatin (Lipitor) 20 mg tablet [id number] Yes Take 1 tablet (20 mg) by mouth once daily. Historical MD Jesus Taking Active   b complex vitamins capsule [id number]  Take 1 tablet by mouth once daily. Historical Provider, MD  Active   BD Ultra-Fine Mini Pen Needle 31 gauge x 3/16\" needle [id number]  USE ONCE DAILY TO INJECT LANTUS Historical Provider, MD  Active   benzocaine-menthoL lozenge [id number]  Take 1 lozenge by mouth every 2 hours if needed. Svetlana Provider, MD  Active   dapagliflozin propanediol (Farxiga) 5 mg [id number] No Take 2 tablets (10 mg) by mouth once daily. Svetlana Lee MD Not Taking Active   denosumab (Prolia) 60 mg/mL syringe [id number] No Physician to inject 1 mL (60 mg total) under the skin every 6 months. For office use only.   Patient not taking: Reported on 10/10/2024    Binta Dunne MD Not Taking Active   dulaglutide (Trulicity) 0.75 mg/0.5 mL pen injector [id number] Yes Inject 0.75 mg under the skin 1 (one) time per week. Binta Dunne MD Taking Active   empagliflozin (Jardiance) 10 mg [id number] No Take 1 tablet (10 mg) by mouth once daily.   Patient not taking: Reported on 10/10/2024    Binta Dunne MD Not Taking Active   insulin lispro (HumaLOG) 100 unit/mL injection [id number] Yes 2 Units 3 times daily (morning, midday, late afternoon). Svetlana Lee MD Taking Active   Lantus Solostar U-100 Insulin 100 unit/mL (3 mL) pen [id number] Yes Inject 18 Units under the skin once daily in the morning. Svetlana Lee MD Taking Active   latanoprost (Xalatan) 0.005 % ophthalmic solution [id number] Yes  Svetlana Lee MD Taking Active   lisinopril 40 mg tablet [id number] Yes TAKE 1 TABLET BY MOUTH ONCE  DAILY Scarlett Bangura MD Taking Active   melatonin 3 mg tablet [id number] No Take 1 tablet (3 mg) by mouth once daily as needed. Svetlana Lee MD Not Taking Active   metoprolol succinate XL (Toprol-XL) 100 mg 24 hr tablet [id number] Yes Take 1 " tablet (100 mg) by mouth once daily. DO NOT CRUSH OR CHEW Azael Morales MD Taking Active   polyethylene glycol (Glycolax, Miralax) 17 gram packet [id number] No Take 17 g by mouth once daily. Historical Provider, MD Not Taking Active   risedronate (Actonel) 35 mg tablet [id number] No Take 1 tablet (35 mg) by mouth every 7 days. Historical Provider, MD Not Taking Active   sennosides (Senokot) 8.6 mg tablet [id number] No 1 tablet (8.6 mg) by Enteral route twice a day. Historical Provider, MD Not Taking Active                    Relevant Results Recent labs reviewed in the EMR.  Lab Results   Component Value Date    HGB 15.3 08/12/2024    HGB 16.1 (H) 06/13/2024    HGB 17.0 (H) 06/12/2024    MCV 94 08/12/2024    MCV 89 06/13/2024    MCV 94 06/12/2024     08/12/2024     06/13/2024     06/12/2024       Lab Results   Component Value Date    FERRITIN 42 08/12/2024    IRON 156 (H) 08/12/2024       Lab Results   Component Value Date     08/12/2024    K 4.0 08/12/2024     08/12/2024    BUN 38 (H) 08/12/2024    CREATININE 1.56 (H) 08/12/2024       Imaging:  I personally reviewed then and this is my impression:   Kidney US 2/2023  RIGHT KIDNEY:   The right kidney measures 9.9 cm in length. The renal cortical   echogenicity is within normal limits. No hydronephrosis is present.   No evidence of nephrolithiasis. Multiple renal cysts are noted,   largest measuring up to 2.9 x 2.9 x 2.9 cm within the midpole..   LEFT KIDNEY:   The left kidney measures 10.0 cm in length. The renal cortical   echogenicity is within normal limits. No hydronephrosis is present.   No evidence of nephrolithiasis.Septated midpole cyst measuring up to   4.5 x 3.9 x 3.5 cm.. This measured up to 3.1 cm on 09/08/2017.        Assessment/Plan   1. Chronic renal impairment, stage 3 (moderate), unspecified whether stage 3a or 3b CKD (Multi) (Primary)  -  long standing CKD, stable Scr for the last 4 years, due to HTN/DM and aging,  still very proteinuric on RASi+SGLT2i, not using NSAID. Will add nsMAR for additional kidney protection. Normal potassium  - finerenone (Kerendia) 10 mg tablet tablet; Take 1 tablet (10 mg) by mouth once daily.  Dispense: 30 tablet; Refill: 11  - Renal function panel; Future  - Follow Up In Nephrology; Future    2. Polycythemia    3. Essential hypertension  - suboptimal control, increasing amlodipine dose   - amLODIPine (Norvasc) 5 mg tablet; Take 1 tablet (5 mg) by mouth once daily.  Dispense: 30 tablet; Refill: 5  - Follow Up In Nephrology; Future    4. Type 2 diabetes mellitus with stage 3b chronic kidney disease, without long-term current use of insulin (Multi)  - finerenone (Kerendia) 10 mg tablet tablet; Take 1 tablet (10 mg) by mouth once daily.  Dispense: 30 tablet; Refill: 11  - Follow Up In Nephrology; Future     - do repeat RFP 1 month after starting kerendia, RTO 3 months   Jarad Urbano MD  Nephrology and Hypertension

## 2024-10-16 ENCOUNTER — APPOINTMENT (OUTPATIENT)
Dept: NEPHROLOGY | Facility: CLINIC | Age: 74
End: 2024-10-16
Payer: MEDICARE

## 2024-10-16 VITALS
WEIGHT: 112.4 LBS | RESPIRATION RATE: 18 BRPM | HEART RATE: 82 BPM | BODY MASS INDEX: 18.7 KG/M2 | SYSTOLIC BLOOD PRESSURE: 146 MMHG | OXYGEN SATURATION: 95 % | DIASTOLIC BLOOD PRESSURE: 100 MMHG

## 2024-10-16 DIAGNOSIS — E11.22 TYPE 2 DIABETES MELLITUS WITH STAGE 3B CHRONIC KIDNEY DISEASE, WITHOUT LONG-TERM CURRENT USE OF INSULIN (MULTI): ICD-10-CM

## 2024-10-16 DIAGNOSIS — I10 ESSENTIAL HYPERTENSION: ICD-10-CM

## 2024-10-16 DIAGNOSIS — N18.32 TYPE 2 DIABETES MELLITUS WITH STAGE 3B CHRONIC KIDNEY DISEASE, WITHOUT LONG-TERM CURRENT USE OF INSULIN (MULTI): ICD-10-CM

## 2024-10-16 DIAGNOSIS — N18.30 CHRONIC RENAL IMPAIRMENT, STAGE 3 (MODERATE), UNSPECIFIED WHETHER STAGE 3A OR 3B CKD (MULTI): Primary | ICD-10-CM

## 2024-10-16 DIAGNOSIS — D75.1 POLYCYTHEMIA: ICD-10-CM

## 2024-10-16 PROCEDURE — 3062F POS MACROALBUMINURIA REV: CPT | Performed by: INTERNAL MEDICINE

## 2024-10-16 PROCEDURE — 1159F MED LIST DOCD IN RCRD: CPT | Performed by: INTERNAL MEDICINE

## 2024-10-16 PROCEDURE — 3052F HG A1C>EQUAL 8.0%<EQUAL 9.0%: CPT | Performed by: INTERNAL MEDICINE

## 2024-10-16 PROCEDURE — 1126F AMNT PAIN NOTED NONE PRSNT: CPT | Performed by: INTERNAL MEDICINE

## 2024-10-16 PROCEDURE — 3048F LDL-C <100 MG/DL: CPT | Performed by: INTERNAL MEDICINE

## 2024-10-16 PROCEDURE — 3077F SYST BP >= 140 MM HG: CPT | Performed by: INTERNAL MEDICINE

## 2024-10-16 PROCEDURE — 4010F ACE/ARB THERAPY RXD/TAKEN: CPT | Performed by: INTERNAL MEDICINE

## 2024-10-16 PROCEDURE — 99214 OFFICE O/P EST MOD 30 MIN: CPT | Performed by: INTERNAL MEDICINE

## 2024-10-16 PROCEDURE — 3080F DIAST BP >= 90 MM HG: CPT | Performed by: INTERNAL MEDICINE

## 2024-10-16 RX ORDER — AMLODIPINE BESYLATE 5 MG/1
5 TABLET ORAL DAILY
Qty: 30 TABLET | Refills: 5 | Status: SHIPPED | OUTPATIENT
Start: 2024-10-16 | End: 2025-04-14

## 2024-10-16 ASSESSMENT — PAIN SCALES - GENERAL: PAINLEVEL_OUTOF10: 0-NO PAIN

## 2024-10-16 NOTE — PATIENT INSTRUCTIONS
- get a repeat blood work in 1 month after starting new medication, finerenone   Mom aware script was sent.

## 2024-11-06 ENCOUNTER — HOSPITAL ENCOUNTER (INPATIENT)
Facility: HOSPITAL | Age: 74
LOS: 3 days | Discharge: HOME | End: 2024-11-10
Attending: STUDENT IN AN ORGANIZED HEALTH CARE EDUCATION/TRAINING PROGRAM | Admitting: SURGERY
Payer: MEDICARE

## 2024-11-06 ENCOUNTER — APPOINTMENT (OUTPATIENT)
Dept: RADIOLOGY | Facility: HOSPITAL | Age: 74
DRG: 082 | End: 2024-11-06
Payer: MEDICARE

## 2024-11-06 DIAGNOSIS — S06.5XAA SDH (SUBDURAL HEMATOMA) (MULTI): ICD-10-CM
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
## 2024-11-06 DIAGNOSIS — S06.4XAA EPIDURAL HEMATOMA (MULTI): ICD-10-CM

## 2024-11-06 PROCEDURE — G0390 TRAUMA RESPONS W/HOSP CRITI: HCPCS

## 2024-11-06 PROCEDURE — 99285 EMERGENCY DEPT VISIT HI MDM: CPT | Performed by: STUDENT IN AN ORGANIZED HEALTH CARE EDUCATION/TRAINING PROGRAM

## 2024-11-06 PROCEDURE — 99285 EMERGENCY DEPT VISIT HI MDM: CPT | Mod: 25

## 2024-11-06 PROCEDURE — 70450 CT HEAD/BRAIN W/O DYE: CPT

## 2024-11-06 PROCEDURE — 72125 CT NECK SPINE W/O DYE: CPT

## 2024-11-06 PROCEDURE — 93010 ELECTROCARDIOGRAM REPORT: CPT | Performed by: STUDENT IN AN ORGANIZED HEALTH CARE EDUCATION/TRAINING PROGRAM

## 2024-11-06 ASSESSMENT — COLUMBIA-SUICIDE SEVERITY RATING SCALE - C-SSRS
6. HAVE YOU EVER DONE ANYTHING, STARTED TO DO ANYTHING, OR PREPARED TO DO ANYTHING TO END YOUR LIFE?: NO
1. IN THE PAST MONTH, HAVE YOU WISHED YOU WERE DEAD OR WISHED YOU COULD GO TO SLEEP AND NOT WAKE UP?: NO
2. HAVE YOU ACTUALLY HAD ANY THOUGHTS OF KILLING YOURSELF?: NO

## 2024-11-06 ASSESSMENT — PAIN - FUNCTIONAL ASSESSMENT: PAIN_FUNCTIONAL_ASSESSMENT: 0-10

## 2024-11-06 ASSESSMENT — PAIN SCALES - GENERAL: PAINLEVEL_OUTOF10: 0 - NO PAIN

## 2024-11-07 ENCOUNTER — CLINICAL SUPPORT (OUTPATIENT)
Dept: EMERGENCY MEDICINE | Facility: HOSPITAL | Age: 74
DRG: 082 | End: 2024-11-07
Payer: MEDICARE

## 2024-11-07 ENCOUNTER — APPOINTMENT (OUTPATIENT)
Dept: RADIOLOGY | Facility: HOSPITAL | Age: 74
DRG: 082 | End: 2024-11-07
Payer: MEDICARE
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
## 2024-11-07 LAB
ABO GROUP (TYPE) IN BLOOD: NORMAL
ALBUMIN SERPL BCP-MCNC: 4.1 G/DL (ref 3.4–5)
ALP SERPL-CCNC: 98 U/L (ref 33–136)
ALT SERPL W P-5'-P-CCNC: 25 U/L (ref 7–45)
ANION GAP SERPL CALC-SCNC: 15 MMOL/L (ref 10–20)
ANTIBODY SCREEN: NORMAL
APTT PPP: 29 SECONDS (ref 27–38)
AST SERPL W P-5'-P-CCNC: 24 U/L (ref 9–39)
ATRIAL RATE: 90 BPM
BASOPHILS # BLD AUTO: 0.03 X10*3/UL (ref 0–0.1)
BASOPHILS NFR BLD AUTO: 0.3 %
BILIRUB SERPL-MCNC: 1 MG/DL (ref 0–1.2)
BUN SERPL-MCNC: 29 MG/DL (ref 6–23)
CALCIUM SERPL-MCNC: 10.5 MG/DL (ref 8.6–10.6)
CHLORIDE SERPL-SCNC: 105 MMOL/L (ref 98–107)
CO2 SERPL-SCNC: 29 MMOL/L (ref 21–32)
CREAT SERPL-MCNC: 1.67 MG/DL (ref 0.5–1.05)
EGFRCR SERPLBLD CKD-EPI 2021: 32 ML/MIN/1.73M*2
EOSINOPHIL # BLD AUTO: 0.26 X10*3/UL (ref 0–0.4)
EOSINOPHIL NFR BLD AUTO: 2.4 %
ERYTHROCYTE [DISTWIDTH] IN BLOOD BY AUTOMATED COUNT: 13.3 % (ref 11.5–14.5)
GLUCOSE BLD MANUAL STRIP-MCNC: 110 MG/DL (ref 74–99)
GLUCOSE BLD MANUAL STRIP-MCNC: 124 MG/DL (ref 74–99)
GLUCOSE BLD MANUAL STRIP-MCNC: 202 MG/DL (ref 74–99)
GLUCOSE SERPL-MCNC: 151 MG/DL (ref 74–99)
HCT VFR BLD AUTO: 49.7 % (ref 36–46)
HGB BLD-MCNC: 16.7 G/DL (ref 12–16)
IMM GRANULOCYTES # BLD AUTO: 0.06 X10*3/UL (ref 0–0.5)
IMM GRANULOCYTES NFR BLD AUTO: 0.6 % (ref 0–0.9)
INR PPP: 1 (ref 0.9–1.1)
LYMPHOCYTES # BLD AUTO: 1 X10*3/UL (ref 0.8–3)
LYMPHOCYTES NFR BLD AUTO: 9.4 %
MCH RBC QN AUTO: 29.4 PG (ref 26–34)
MCHC RBC AUTO-ENTMCNC: 33.6 G/DL (ref 32–36)
MCV RBC AUTO: 88 FL (ref 80–100)
MONOCYTES # BLD AUTO: 0.63 X10*3/UL (ref 0.05–0.8)
MONOCYTES NFR BLD AUTO: 5.9 %
NEUTROPHILS # BLD AUTO: 8.69 X10*3/UL (ref 1.6–5.5)
NEUTROPHILS NFR BLD AUTO: 81.4 %
NRBC BLD-RTO: 0 /100 WBCS (ref 0–0)
P AXIS: 48 DEGREES
P OFFSET: 204 MS
P ONSET: 140 MS
PLATELET # BLD AUTO: 242 X10*3/UL (ref 150–450)
POTASSIUM SERPL-SCNC: 4 MMOL/L (ref 3.5–5.3)
PR INTERVAL: 172 MS
PROT SERPL-MCNC: 7.9 G/DL (ref 6.4–8.2)
PROTHROMBIN TIME: 11.7 SECONDS (ref 9.8–12.8)
Q ONSET: 226 MS
QRS COUNT: 15 BEATS
QRS DURATION: 92 MS
QT INTERVAL: 374 MS
QTC CALCULATION(BAZETT): 457 MS
QTC FREDERICIA: 428 MS
R AXIS: -23 DEGREES
RBC # BLD AUTO: 5.68 X10*6/UL (ref 4–5.2)
RH FACTOR (ANTIGEN D): NORMAL
SODIUM SERPL-SCNC: 145 MMOL/L (ref 136–145)
T AXIS: -4 DEGREES
T OFFSET: 413 MS
VENTRICULAR RATE: 90 BPM
WBC # BLD AUTO: 10.7 X10*3/UL (ref 4.4–11.3)

## 2024-11-07 PROCEDURE — 85610 PROTHROMBIN TIME: CPT

## 2024-11-07 PROCEDURE — 80053 COMPREHEN METABOLIC PANEL: CPT

## 2024-11-07 PROCEDURE — 70450 CT HEAD/BRAIN W/O DYE: CPT | Performed by: RADIOLOGY

## 2024-11-07 PROCEDURE — 93005 ELECTROCARDIOGRAM TRACING: CPT

## 2024-11-07 PROCEDURE — 82947 ASSAY GLUCOSE BLOOD QUANT: CPT

## 2024-11-07 PROCEDURE — 2500000004 HC RX 250 GENERAL PHARMACY W/ HCPCS (ALT 636 FOR OP/ED): Performed by: STUDENT IN AN ORGANIZED HEALTH CARE EDUCATION/TRAINING PROGRAM

## 2024-11-07 PROCEDURE — 2500000001 HC RX 250 WO HCPCS SELF ADMINISTERED DRUGS (ALT 637 FOR MEDICARE OP)

## 2024-11-07 PROCEDURE — 70450 CT HEAD/BRAIN W/O DYE: CPT

## 2024-11-07 PROCEDURE — 2020000001 HC ICU ROOM DAILY

## 2024-11-07 PROCEDURE — 2500000001 HC RX 250 WO HCPCS SELF ADMINISTERED DRUGS (ALT 637 FOR MEDICARE OP): Performed by: PHYSICIAN ASSISTANT

## 2024-11-07 PROCEDURE — 72125 CT NECK SPINE W/O DYE: CPT | Performed by: RADIOLOGY

## 2024-11-07 PROCEDURE — 85025 COMPLETE CBC W/AUTO DIFF WBC: CPT

## 2024-11-07 PROCEDURE — 85730 THROMBOPLASTIN TIME PARTIAL: CPT

## 2024-11-07 PROCEDURE — 86901 BLOOD TYPING SEROLOGIC RH(D): CPT

## 2024-11-07 PROCEDURE — 99291 CRITICAL CARE FIRST HOUR: CPT | Performed by: SURGERY

## 2024-11-07 PROCEDURE — 99222 1ST HOSP IP/OBS MODERATE 55: CPT | Performed by: NEUROLOGICAL SURGERY

## 2024-11-07 PROCEDURE — 2500000002 HC RX 250 W HCPCS SELF ADMINISTERED DRUGS (ALT 637 FOR MEDICARE OP, ALT 636 FOR OP/ED): Performed by: PHYSICIAN ASSISTANT

## 2024-11-07 PROCEDURE — 36415 COLL VENOUS BLD VENIPUNCTURE: CPT

## 2024-11-07 RX ORDER — OXYCODONE HYDROCHLORIDE 5 MG/1
5 TABLET ORAL EVERY 6 HOURS PRN
Status: DISCONTINUED | OUTPATIENT
Start: 2024-11-07 | End: 2024-11-10 | Stop reason: HOSPADM

## 2024-11-07 RX ORDER — SODIUM CHLORIDE 9 MG/ML
75 INJECTION, SOLUTION INTRAVENOUS CONTINUOUS
Status: DISCONTINUED | OUTPATIENT
Start: 2024-11-07 | End: 2024-11-08

## 2024-11-07 RX ORDER — METOPROLOL TARTRATE 50 MG/1
50 TABLET ORAL 2 TIMES DAILY
Status: DISCONTINUED | OUTPATIENT
Start: 2024-11-07 | End: 2024-11-10 | Stop reason: HOSPADM

## 2024-11-07 RX ORDER — DEXTROSE 50 % IN WATER (D50W) INTRAVENOUS SYRINGE
12.5
Status: DISCONTINUED | OUTPATIENT
Start: 2024-11-07 | End: 2024-11-10 | Stop reason: HOSPADM

## 2024-11-07 RX ORDER — OXYCODONE HYDROCHLORIDE 5 MG/1
2.5 TABLET ORAL EVERY 6 HOURS PRN
Status: DISCONTINUED | OUTPATIENT
Start: 2024-11-07 | End: 2024-11-10 | Stop reason: HOSPADM

## 2024-11-07 RX ORDER — LATANOPROST 50 UG/ML
1 SOLUTION/ DROPS OPHTHALMIC NIGHTLY
Status: DISCONTINUED | OUTPATIENT
Start: 2024-11-07 | End: 2024-11-10 | Stop reason: HOSPADM

## 2024-11-07 RX ORDER — DEXTROSE 50 % IN WATER (D50W) INTRAVENOUS SYRINGE
25
Status: DISCONTINUED | OUTPATIENT
Start: 2024-11-07 | End: 2024-11-10 | Stop reason: HOSPADM

## 2024-11-07 RX ORDER — AMLODIPINE BESYLATE 5 MG/1
5 TABLET ORAL DAILY
Status: DISCONTINUED | OUTPATIENT
Start: 2024-11-07 | End: 2024-11-10 | Stop reason: HOSPADM

## 2024-11-07 RX ORDER — INSULIN LISPRO 100 [IU]/ML
0-10 INJECTION, SOLUTION INTRAVENOUS; SUBCUTANEOUS
Status: DISCONTINUED | OUTPATIENT
Start: 2024-11-07 | End: 2024-11-08

## 2024-11-07 RX ORDER — AMOXICILLIN 250 MG
2 CAPSULE ORAL 2 TIMES DAILY
Status: DISCONTINUED | OUTPATIENT
Start: 2024-11-07 | End: 2024-11-10 | Stop reason: HOSPADM

## 2024-11-07 RX ORDER — ASPIRIN 325 MG
325 TABLET, DELAYED RELEASE (ENTERIC COATED) ORAL DAILY
COMMUNITY

## 2024-11-07 RX ORDER — ACETAMINOPHEN 325 MG/1
975 TABLET ORAL ONCE
Status: COMPLETED | OUTPATIENT
Start: 2024-11-07 | End: 2024-11-07

## 2024-11-07 RX ORDER — ACETAMINOPHEN 325 MG/1
975 TABLET ORAL EVERY 8 HOURS
Status: DISCONTINUED | OUTPATIENT
Start: 2024-11-07 | End: 2024-11-10 | Stop reason: HOSPADM

## 2024-11-07 RX ORDER — ATORVASTATIN CALCIUM 20 MG/1
20 TABLET, FILM COATED ORAL DAILY
Status: DISCONTINUED | OUTPATIENT
Start: 2024-11-07 | End: 2024-11-10 | Stop reason: HOSPADM

## 2024-11-07 ASSESSMENT — LIFESTYLE VARIABLES
TOTAL SCORE: 0
EVER HAD A DRINK FIRST THING IN THE MORNING TO STEADY YOUR NERVES TO GET RID OF A HANGOVER: NO
EVER FELT BAD OR GUILTY ABOUT YOUR DRINKING: NO
HAVE PEOPLE ANNOYED YOU BY CRITICIZING YOUR DRINKING: NO
HAVE YOU EVER FELT YOU SHOULD CUT DOWN ON YOUR DRINKING: NO

## 2024-11-07 ASSESSMENT — COGNITIVE AND FUNCTIONAL STATUS - GENERAL
STANDING UP FROM CHAIR USING ARMS: A LITTLE
WALKING IN HOSPITAL ROOM: A LITTLE
DAILY ACTIVITIY SCORE: 24
MOVING TO AND FROM BED TO CHAIR: A LITTLE
MOBILITY SCORE: 20
STANDING UP FROM CHAIR USING ARMS: A LITTLE
MOVING TO AND FROM BED TO CHAIR: A LITTLE
CLIMB 3 TO 5 STEPS WITH RAILING: A LITTLE
WALKING IN HOSPITAL ROOM: A LITTLE
CLIMB 3 TO 5 STEPS WITH RAILING: A LITTLE
MOBILITY SCORE: 20
DAILY ACTIVITIY SCORE: 24

## 2024-11-07 ASSESSMENT — PAIN SCALES - GENERAL
PAINLEVEL_OUTOF10: 0 - NO PAIN
PAINLEVEL_OUTOF10: 5 - MODERATE PAIN
PAINLEVEL_OUTOF10: 0 - NO PAIN

## 2024-11-07 ASSESSMENT — PAIN - FUNCTIONAL ASSESSMENT
PAIN_FUNCTIONAL_ASSESSMENT: 0-10
PAIN_FUNCTIONAL_ASSESSMENT: 0-10

## 2024-11-07 ASSESSMENT — ACTIVITIES OF DAILY LIVING (ADL): LACK_OF_TRANSPORTATION: NO

## 2024-11-07 ASSESSMENT — ENCOUNTER SYMPTOMS: HEADACHES: 1
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
## 2024-11-07 NOTE — PROGRESS NOTES
Pharmacy Medication History Review    Saige Yates is a 73 y.o. female admitted for No Principal Problem: There is no principal problem currently on the Problem List. Please update the Problem List and refresh.. Pharmacy reviewed the patient's [id number] medications and allergies for accuracy.    The list below reflects the updated PTA list.   Prior to Admission Medications   Prescriptions Last Dose Informant   amLODIPine (Norvasc) 5 mg tablet 11/6/2024 Morning Child   Sig: Take 1 tablet (5 mg) by mouth once daily.   aspirin 325 mg EC tablet 11/6/2024 Morning Child   Sig: Take 1 tablet (325 mg) by mouth once daily.   aspirin 81 mg chewable tablet Not Taking Child   Sig: Chew 1 tablet (81 mg) once daily.   Patient not taking: Reported on 11/7/2024   atorvastatin (Lipitor) 20 mg tablet 11/6/2024 Child   Sig: Take 1 tablet (20 mg) by mouth once daily.   dulaglutide (Trulicity) 0.75 mg/0.5 mL pen injector 11/3/2024 Child   Sig: Inject 0.75 mg under the skin 1 (one) time per week.   empagliflozin (Jardiance) 10 mg 11/6/2024 Morning Child   Sig: Take 1 tablet (10 mg) by mouth once daily.   finerenone (Kerendia) 10 mg tablet tablet Not Taking Child   Sig: Take 1 tablet (10 mg) by mouth once daily.   Patient not taking: Reported on 11/7/2024   latanoprost (Xalatan) 0.005 % ophthalmic solution Past Week Child   Sig: Administer 1 drop into both eyes once daily at bedtime.   lisinopril 40 mg tablet 11/6/2024 Morning Child   Sig: TAKE 1 TABLET BY MOUTH ONCE  DAILY   metoprolol succinate XL (Toprol-XL) 100 mg 24 hr tablet 11/6/2024 Morning Child   Sig: Take 1 tablet (100 mg) by mouth once daily. DO NOT CRUSH OR CHEW      Facility-Administered Medications: None        The list below reflects the updated allergy list. Please review each documented allergy for additional clarification and justification.  Allergies  Reviewed by Heike Bailey RN on 11/6/2024        Severity Reactions Comments    Other Not Specified Unknown  "HAIR DYE    Penicillins Not Specified Unknown             Patient declines M2B at discharge.     Sources:   Family Interview - with daughter Jo via phone, good historian - had medication bottles on hand during interview  Admission MedRec Grid  10/16/24 Nephrology Office Visit note (author: Sundeep)  OARRS - none  EPIC medication dispense report    Medications ADDED:  Aspirin 325mg  Medications CHANGED:  Latanoprost - added sig  Medications REMOVED/MARKED NOT TAKING:   Acetaminophen 325mg  Anastrozole 1mg  Aspirin 81mg  B complex vitamin  BD Fine needle  Benzocaine-menthol lozenge  Kerendia 10mg  Melatonin 3mg  Polyethylene glycol 17mg  Risedronate 35mg  Sennosides 8.6mg    Additional Comments:  Per 10/16 Office visit note, patient was to start Kerendia, however daughter states patient did not start this medication, and there is no pharmacy fill history    Harmony Subramanian, PharmD  Transitions of Care Pharmacist  11/07/24     Secure Chat preferred   If no response call [phone] or Vocera \"Med Rec\"  '[  "
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
## 2024-11-07 NOTE — PROGRESS NOTES
11/07/24 1101   Discharge Planning   Living Arrangements Children   Support Systems Children   Assistance Needed son Noah is 24/7 in home caretaker   Type of Residence Private residence   Number of Stairs to Enter Residence 4   Number of Stairs Within Residence 0   Do you have animals or pets at home? No   Who is requesting discharge planning? Provider   Home or Post Acute Services None   Expected Discharge Disposition Home   Does the patient need discharge transport arranged? Yes   RoundTrip coordination needed? Yes   Has discharge transport been arranged? No   Financial Resource Strain   How hard is it for you to pay for the very basics like food, housing, medical care, and heating? Not very   Housing Stability   In the last 12 months, was there a time when you were not able to pay the mortgage or rent on time? N   At any time in the past 12 months, were you homeless or living in a shelter (including now)? N   Transportation Needs   In the past 12 months, has lack of transportation kept you from medical appointments or from getting medications? no   In the past 12 months, has lack of transportation kept you from meetings, work, or from getting things needed for daily living? No   Patient Choice   Provider Choice list and CMS website (https://medicare.gov/care-compare#search) for post-acute Quality and Resource Measure Data were provided and reviewed with: Family   Patient / Family choosing to utilize agency / facility established prior to hospitalization Yes     Saige Yates is a 73 y.o. female on day 0 of admission presenting with Fall, initial encounter.    SW met with patient and son at bedside. Son Noah lives with patient provides 24/7 care. Patient has other children but they are still working. Son quit job to care for mother full time. Patient has been in and out of the hospital and [place] several times in the last 4 months for falls and stroke. Fall Feb 2024 and July 2024. TIA stroke 2024, per son.  Patient completed [facility] July 2024.  PCP. Patient has no in home services at this time. Son informed patient is independent with assistance prior to admission. Still walking outside for exercise. Pending transfer to TSICU. Patient will require PT/OT to assist with discharge planning needs.
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
## 2024-11-08 LAB
ALBUMIN SERPL BCP-MCNC: 2.8 G/DL (ref 3.4–5)
ANION GAP SERPL CALC-SCNC: 11 MMOL/L (ref 10–20)
BUN SERPL-MCNC: 28 MG/DL (ref 6–23)
CALCIUM SERPL-MCNC: 7.7 MG/DL (ref 8.6–10.6)
CHLORIDE SERPL-SCNC: 113 MMOL/L (ref 98–107)
CO2 SERPL-SCNC: 22 MMOL/L (ref 21–32)
CREAT SERPL-MCNC: 1.48 MG/DL (ref 0.5–1.05)
EGFRCR SERPLBLD CKD-EPI 2021: 37 ML/MIN/1.73M*2
ERYTHROCYTE [DISTWIDTH] IN BLOOD BY AUTOMATED COUNT: 13.5 % (ref 11.5–14.5)
GLUCOSE BLD MANUAL STRIP-MCNC: 143 MG/DL (ref 74–99)
GLUCOSE BLD MANUAL STRIP-MCNC: 152 MG/DL (ref 74–99)
GLUCOSE BLD MANUAL STRIP-MCNC: 237 MG/DL (ref 74–99)
GLUCOSE SERPL-MCNC: 119 MG/DL (ref 74–99)
HCT VFR BLD AUTO: 44.9 % (ref 36–46)
HGB BLD-MCNC: 14.5 G/DL (ref 12–16)
MAGNESIUM SERPL-MCNC: 1.66 MG/DL (ref 1.6–2.4)
MCH RBC QN AUTO: 29.8 PG (ref 26–34)
MCHC RBC AUTO-ENTMCNC: 32.3 G/DL (ref 32–36)
MCV RBC AUTO: 92 FL (ref 80–100)
NRBC BLD-RTO: 0 /100 WBCS (ref 0–0)
PHOSPHATE SERPL-MCNC: 2.5 MG/DL (ref 2.5–4.9)
PLATELET # BLD AUTO: 216 X10*3/UL (ref 150–450)
POTASSIUM SERPL-SCNC: 3.4 MMOL/L (ref 3.5–5.3)
RBC # BLD AUTO: 4.87 X10*6/UL (ref 4–5.2)
SODIUM SERPL-SCNC: 143 MMOL/L (ref 136–145)
WBC # BLD AUTO: 7 X10*3/UL (ref 4.4–11.3)

## 2024-11-08 PROCEDURE — 2500000002 HC RX 250 W HCPCS SELF ADMINISTERED DRUGS (ALT 637 FOR MEDICARE OP, ALT 636 FOR OP/ED): Performed by: NURSE PRACTITIONER

## 2024-11-08 PROCEDURE — 2500000002 HC RX 250 W HCPCS SELF ADMINISTERED DRUGS (ALT 637 FOR MEDICARE OP, ALT 636 FOR OP/ED): Performed by: PHYSICIAN ASSISTANT

## 2024-11-08 PROCEDURE — 2500000001 HC RX 250 WO HCPCS SELF ADMINISTERED DRUGS (ALT 637 FOR MEDICARE OP): Performed by: PHYSICIAN ASSISTANT

## 2024-11-08 PROCEDURE — 82947 ASSAY GLUCOSE BLOOD QUANT: CPT

## 2024-11-08 PROCEDURE — 1100000001 HC PRIVATE ROOM DAILY

## 2024-11-08 PROCEDURE — 99232 SBSQ HOSP IP/OBS MODERATE 35: CPT | Performed by: SURGERY

## 2024-11-08 PROCEDURE — 80069 RENAL FUNCTION PANEL: CPT | Performed by: NURSE PRACTITIONER

## 2024-11-08 PROCEDURE — 97535 SELF CARE MNGMENT TRAINING: CPT | Mod: GO

## 2024-11-08 PROCEDURE — 83735 ASSAY OF MAGNESIUM: CPT | Performed by: NURSE PRACTITIONER

## 2024-11-08 PROCEDURE — 97165 OT EVAL LOW COMPLEX 30 MIN: CPT | Mod: GO

## 2024-11-08 PROCEDURE — 97530 THERAPEUTIC ACTIVITIES: CPT | Mod: GP | Performed by: PHYSICAL THERAPIST

## 2024-11-08 PROCEDURE — 85027 COMPLETE CBC AUTOMATED: CPT | Performed by: STUDENT IN AN ORGANIZED HEALTH CARE EDUCATION/TRAINING PROGRAM

## 2024-11-08 PROCEDURE — 36415 COLL VENOUS BLD VENIPUNCTURE: CPT | Performed by: STUDENT IN AN ORGANIZED HEALTH CARE EDUCATION/TRAINING PROGRAM

## 2024-11-08 PROCEDURE — 97161 PT EVAL LOW COMPLEX 20 MIN: CPT | Mod: GP | Performed by: PHYSICAL THERAPIST

## 2024-11-08 PROCEDURE — 2500000004 HC RX 250 GENERAL PHARMACY W/ HCPCS (ALT 636 FOR OP/ED): Performed by: NURSE PRACTITIONER

## 2024-11-08 PROCEDURE — 99291 CRITICAL CARE FIRST HOUR: CPT | Performed by: SURGERY

## 2024-11-08 PROCEDURE — 2500000001 HC RX 250 WO HCPCS SELF ADMINISTERED DRUGS (ALT 637 FOR MEDICARE OP): Performed by: STUDENT IN AN ORGANIZED HEALTH CARE EDUCATION/TRAINING PROGRAM

## 2024-11-08 RX ORDER — INSULIN LISPRO 100 [IU]/ML
0-15 INJECTION, SOLUTION INTRAVENOUS; SUBCUTANEOUS
Status: DISCONTINUED | OUTPATIENT
Start: 2024-11-08 | End: 2024-11-09

## 2024-11-08 RX ORDER — LISINOPRIL 40 MG/1
40 TABLET ORAL DAILY
Status: DISCONTINUED | OUTPATIENT
Start: 2024-11-08 | End: 2024-11-10 | Stop reason: HOSPADM

## 2024-11-08 RX ORDER — MAGNESIUM SULFATE HEPTAHYDRATE 40 MG/ML
2 INJECTION, SOLUTION INTRAVENOUS ONCE
Status: COMPLETED | OUTPATIENT
Start: 2024-11-08 | End: 2024-11-08

## 2024-11-08 RX ORDER — POTASSIUM CHLORIDE 20 MEQ/1
20 TABLET, EXTENDED RELEASE ORAL ONCE
Status: COMPLETED | OUTPATIENT
Start: 2024-11-08 | End: 2024-11-08

## 2024-11-08 ASSESSMENT — PAIN SCALES - GENERAL
PAINLEVEL_OUTOF10: 0 - NO PAIN

## 2024-11-08 ASSESSMENT — PAIN - FUNCTIONAL ASSESSMENT
PAIN_FUNCTIONAL_ASSESSMENT: 0-10

## 2024-11-08 ASSESSMENT — COGNITIVE AND FUNCTIONAL STATUS - GENERAL
MOVING TO AND FROM BED TO CHAIR: A LITTLE
TURNING FROM BACK TO SIDE WHILE IN FLAT BAD: A LITTLE
DRESSING REGULAR LOWER BODY CLOTHING: A LITTLE
MOBILITY SCORE: 17
TOILETING: A LITTLE
WALKING IN HOSPITAL ROOM: A LITTLE
STANDING UP FROM CHAIR USING ARMS: A LITTLE
DAILY ACTIVITIY SCORE: 21
CLIMB 3 TO 5 STEPS WITH RAILING: TOTAL
HELP NEEDED FOR BATHING: A LITTLE

## 2024-11-08 ASSESSMENT — ACTIVITIES OF DAILY LIVING (ADL)
ADL_ASSISTANCE: INDEPENDENT
HOME_MANAGEMENT_TIME_ENTRY: 12
BATHING_ASSISTANCE: MODERATE
ADL_ASSISTANCE: INDEPENDENT

## 2024-11-08 NOTE — SIGNIFICANT EVENT
Exam is stable. Imaging is stable. No acute neurosurgical intervention or additional neuroimaging needed at this time. Prophylactic anticoagulation allowed on post bleed day 2. Ok for ASA PBD 14. Patient needs 2 weeks follow up with repeat CT head with neurosurgery, we will arrange. Thank you for allowing us to participate in the care of this patient. Will sign off at this time. Please page [phone] with any questions or concerns.    Jewell Pearl MD  PGY-2 Neurosurgery  6:14 AM

## 2024-11-08 NOTE — CARE PLAN
Problem: Skin  Goal: Decreased wound size/increased tissue granulation at next dressing change  Outcome: Progressing  Flowsheets (Taken 11/7/2024 2350)  Decreased wound size/increased tissue granulation at next dressing change: Protective dressings over bony prominences  Goal: Participates in plan/prevention/treatment measures  Outcome: Progressing  Flowsheets (Taken 11/7/2024 2350)  Participates in plan/prevention/treatment measures: Elevate heels  Goal: Prevent/manage excess moisture  Outcome: Progressing  Flowsheets (Taken 11/7/2024 2350)  Prevent/manage excess moisture:   Cleanse incontinence/protect with barrier cream   Moisturize dry skin   Use wicking fabric (obtain order)   Follow provider orders for dressing changes   Monitor for/manage infection if present  Goal: Prevent/minimize sheer/friction injuries  Outcome: Progressing  Flowsheets (Taken 11/7/2024 2350)  Prevent/minimize sheer/friction injuries:   Complete micro-shifts as needed if patient unable. Adjust patient position to relieve pressure points, not a full turn   Increase activity/out of bed for meals   Use pull sheet   HOB 30 degrees or less   Turn/reposition every 2 hours/use positioning/transfer devices   Utilize specialty bed per algorithm  Goal: Promote/optimize nutrition  Outcome: Progressing  Flowsheets (Taken 11/7/2024 2350)  Promote/optimize nutrition:   Consume > 50% meals/supplements   Monitor/record intake including meals  Goal: Promote skin healing  Outcome: Progressing  Flowsheets (Taken 11/7/2024 2350)  Promote skin healing:   Protective dressings over bony prominences   Assess skin/pad under line(s)/device(s)   Turn/reposition every 2 hours/use positioning/transfer devices   Ensure correct size (line/device) and apply per  instructions   Rotate device position/do not position patient on device     Problem: Fall/Injury  Goal: Not fall by end of shift  Outcome: Progressing  Goal: Be free from injury by end of the  shift  Outcome: Progressing  Goal: Verbalize understanding of personal risk factors for fall in the hospital  Outcome: Progressing  Goal: Verbalize understanding of risk factor reduction measures to prevent injury from fall in the home  Outcome: Progressing  Goal: Use assistive devices by end of the shift  Outcome: Progressing  Goal: Pace activities to prevent fatigue by end of the shift  Outcome: Progressing  The clinical goals for the shift include  HDS throughout shift
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
## 2024-11-09 LAB
ALBUMIN SERPL BCP-MCNC: 3.7 G/DL (ref 3.4–5)
ANION GAP SERPL CALC-SCNC: 15 MMOL/L (ref 10–20)
BUN SERPL-MCNC: 31 MG/DL (ref 6–23)
CALCIUM SERPL-MCNC: 10.1 MG/DL (ref 8.6–10.6)
CHLORIDE SERPL-SCNC: 109 MMOL/L (ref 98–107)
CO2 SERPL-SCNC: 23 MMOL/L (ref 21–32)
CREAT SERPL-MCNC: 1.5 MG/DL (ref 0.5–1.05)
EGFRCR SERPLBLD CKD-EPI 2021: 36 ML/MIN/1.73M*2
GLUCOSE BLD MANUAL STRIP-MCNC: 129 MG/DL (ref 74–99)
GLUCOSE BLD MANUAL STRIP-MCNC: 196 MG/DL (ref 74–99)
GLUCOSE BLD MANUAL STRIP-MCNC: 212 MG/DL (ref 74–99)
GLUCOSE BLD MANUAL STRIP-MCNC: 233 MG/DL (ref 74–99)
GLUCOSE SERPL-MCNC: 136 MG/DL (ref 74–99)
MAGNESIUM SERPL-MCNC: 2.24 MG/DL (ref 1.6–2.4)
PHOSPHATE SERPL-MCNC: 2.4 MG/DL (ref 2.5–4.9)
POTASSIUM SERPL-SCNC: 4.2 MMOL/L (ref 3.5–5.3)
SODIUM SERPL-SCNC: 143 MMOL/L (ref 136–145)

## 2024-11-09 PROCEDURE — 1100000001 HC PRIVATE ROOM DAILY

## 2024-11-09 PROCEDURE — 83735 ASSAY OF MAGNESIUM: CPT | Performed by: PHYSICIAN ASSISTANT

## 2024-11-09 PROCEDURE — 82947 ASSAY GLUCOSE BLOOD QUANT: CPT

## 2024-11-09 PROCEDURE — 2500000001 HC RX 250 WO HCPCS SELF ADMINISTERED DRUGS (ALT 637 FOR MEDICARE OP): Performed by: PHYSICIAN ASSISTANT

## 2024-11-09 PROCEDURE — 2500000002 HC RX 250 W HCPCS SELF ADMINISTERED DRUGS (ALT 637 FOR MEDICARE OP, ALT 636 FOR OP/ED): Performed by: PHYSICIAN ASSISTANT

## 2024-11-09 PROCEDURE — 80069 RENAL FUNCTION PANEL: CPT | Performed by: PHYSICIAN ASSISTANT

## 2024-11-09 PROCEDURE — 36415 COLL VENOUS BLD VENIPUNCTURE: CPT | Performed by: PHYSICIAN ASSISTANT

## 2024-11-09 PROCEDURE — 2500000004 HC RX 250 GENERAL PHARMACY W/ HCPCS (ALT 636 FOR OP/ED): Performed by: PHYSICIAN ASSISTANT

## 2024-11-09 RX ORDER — INSULIN LISPRO 100 [IU]/ML
0-15 INJECTION, SOLUTION INTRAVENOUS; SUBCUTANEOUS
Status: DISCONTINUED | OUTPATIENT
Start: 2024-11-09 | End: 2024-11-10 | Stop reason: HOSPADM

## 2024-11-09 RX ORDER — HEPARIN SODIUM 5000 [USP'U]/ML
5000 INJECTION, SOLUTION INTRAVENOUS; SUBCUTANEOUS EVERY 8 HOURS SCHEDULED
Status: DISCONTINUED | OUTPATIENT
Start: 2024-11-09 | End: 2024-11-10 | Stop reason: HOSPADM

## 2024-11-09 ASSESSMENT — COGNITIVE AND FUNCTIONAL STATUS - GENERAL
DRESSING REGULAR LOWER BODY CLOTHING: A LITTLE
CLIMB 3 TO 5 STEPS WITH RAILING: A LITTLE
WALKING IN HOSPITAL ROOM: A LITTLE
HELP NEEDED FOR BATHING: A LITTLE
MOBILITY SCORE: 22
DAILY ACTIVITIY SCORE: 22

## 2024-11-09 ASSESSMENT — ACTIVITIES OF DAILY LIVING (ADL)
HEARING - LEFT EAR: FUNCTIONAL
HEARING - RIGHT EAR: FUNCTIONAL

## 2024-11-09 ASSESSMENT — PAIN - FUNCTIONAL ASSESSMENT: PAIN_FUNCTIONAL_ASSESSMENT: 0-10

## 2024-11-09 ASSESSMENT — PAIN SCALES - GENERAL
PAINLEVEL_OUTOF10: 0 - NO PAIN
PAINLEVEL_OUTOF10: 0 - NO PAIN
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
## 2024-11-09 NOTE — CARE PLAN
The patient's goals for the shift include      The clinical goals for the shift include patient will not report any new onset of pain during shift

## 2024-11-09 NOTE — CARE PLAN
The patient's goals for the shift include      The clinical goals for the shift include Pt will remain safe and freefrom falls throughout the shift

## 2024-11-09 NOTE — PROGRESS NOTES
[facility]  TRAUMA SERVICE - PROGRESS NOTE    Patient Name: Saige Yates  MRN: [id number]  Admit Date: 1106  : 1950  AGE: 73 y.o.   GENDER: female  ==============================================================================  MECHANISM OF INJURY:   73 YOF s/p fall    LOC (yes/no?): unknown  Anticoagulant / Anti-platelet Rx? (for what dx?): no  Referring Facility Name (N/A for scene EMR run): n/a    INJURIES:   SDH    OTHER MEDICAL PROBLEMS:  Prior CVA x 2 most recently   CKD 3  DM2  HFrEF  AAA  Breast CA  Dementia  HTN  HLD    INCIDENTAL FINDINGS:  Enlarged thyroid with a hypodense nodule in the right lobe measuring  up to 2.3 cm. Recommend nonemergent targeted ultrasound to further  Characterize.    PROCEDURES:  N/a    ==============================================================================  TODAY'S ASSESSMENT AND PLAN OF CARE:  ## SDH  - NSGY s/o        -> DVT ppx PBD2 (9am)       -> ASA PBD14 ()       -> Fu in 2 weeks  - q4h neuro checks  - Pain control with wendy. Tyleno and PRN low dose oxycodone 2.5/5mg  - PT/OT; home with HC    ## PMHx  - Baseline Cr with CKD ~1.5  - Continue home Amlodipine, Atorvastatin, eye drops, Lisinopril, Metoprolol  - Continue to hold home Jardiance and Trulicity; currently on ISS #3 with -233/24h  - Hold home ASA until PBD14 ()    ## FEN/GI  - Reg diet  - BR  - Monitor electrolytes and replete as indicated; lytes appropriate     ## PPX  - SCDs  - Start [facility] this AM: CrCl 27    Dispo: Continue care on RNF. Likely dc home with HC tomorrow, 11/10    Total face to face time spent with patient/family of 20 minutes, with >50% of the time spent discussing plan of care/management, counseling/educating on disease processes, explaining results of diagnostic testing.    Patient discussed with attending, Dr. Ralph Baeza, PA-C  Trauma, Critical Care, and Acute Care Surgery  [id number]      ==============================================================================  CHIEF COMPLAINT / OVERNIGHT EVENTS:   No complaints this morning. Patient hoping to go home soon.    MEDICAL HISTORY / ROS:  Admission history and ROS reviewed. Pertinent changes as follows:    PHYSICAL EXAM:  Heart Rate:  [85-96]   Temp:  [36.4 °C (97.5 °F)-37 °C (98.6 °F)]   Resp:  [13-19]   BP: (100-183)/()   SpO2:  [91 %-100 %]   Physical Exam  Constitutional:       General: She is not in acute distress.  HENT:      Head: Normocephalic and atraumatic.      Right Ear: External ear normal.      Left Ear: External ear normal.      Nose: Nose normal.      Mouth/Throat:      Pharynx: Oropharynx is clear.   Eyes:      Extraocular Movements: Extraocular movements intact.      Pupils: Pupils are equal, round, and reactive to light.   Cardiovascular:      Rate and Rhythm: Normal rate and regular rhythm.   Pulmonary:      Effort: Pulmonary effort is normal. No respiratory distress.      Breath sounds: Normal breath sounds.   Chest:      Chest wall: No tenderness.   Abdominal:      General: There is no distension.      Tenderness: There is no abdominal tenderness. There is no guarding.   Musculoskeletal:         General: No tenderness, deformity or signs of injury.      Cervical back: No tenderness.   Neurological:      General: No focal deficit present.      Mental Status: She is alert and oriented to person, place, and time.      Sensory: No sensory deficit.      Motor: No weakness.      Comments: Does have intermittent confusion but A&Ox3 this AM   Psychiatric:         Behavior: Behavior normal.         IMAGING SUMMARY:  (summary of new imaging findings, not a copy of dictation)  No new imaging to review today    LABS:  Results from last 7 days   Lab Units 11/08/24  0221 11/07/24  0125   WBC AUTO x10*3/uL 7.0 10.7   HEMOGLOBIN g/dL 14.5 16.7*   HEMATOCRIT % 44.9 49.7*   PLATELETS AUTO x10*3/uL 216 242   NEUTROS PCT AUTO %  --  81.4    LYMPHS PCT AUTO %  --  9.4   MONOS PCT AUTO %  --  5.9   EOS PCT AUTO %  --  2.4     Results from last 7 days   Lab Units 11/07/24  0125   APTT seconds 29   INR  1.0     Results from last 7 days   Lab Units 11/08/24  0221 11/07/24  0125   SODIUM mmol/L 143 145   POTASSIUM mmol/L 3.4* 4.0   CHLORIDE mmol/L 113* 105   CO2 mmol/L 22 29   BUN mg/dL 28* 29*   CREATININE mg/dL 1.48* 1.67*   CALCIUM mg/dL 7.7* 10.5   PROTEIN TOTAL g/dL  --  7.9   BILIRUBIN TOTAL mg/dL  --  1.0   ALK PHOS U/L  --  98   ALT U/L  --  25   AST U/L  --  24   GLUCOSE mg/dL 119* 151*     Results from last 7 days   Lab Units 11/07/24  0125   BILIRUBIN TOTAL mg/dL 1.0           I have reviewed all medications, laboratory results, and imaging pertinent for today's encounter.

## 2024-11-10 ENCOUNTER — DOCUMENTATION (OUTPATIENT)
Dept: HOME HEALTH SERVICES | Facility: HOME HEALTH | Age: 74
End: 2024-11-10
Payer: MEDICARE

## 2024-11-10 VITALS
DIASTOLIC BLOOD PRESSURE: 89 MMHG | SYSTOLIC BLOOD PRESSURE: 135 MMHG | TEMPERATURE: 97.9 F | HEIGHT: 65 IN | OXYGEN SATURATION: 98 % | HEART RATE: 76 BPM | WEIGHT: 114.86 LBS | BODY MASS INDEX: 19.14 KG/M2 | RESPIRATION RATE: 18 BRPM

## 2024-11-10 LAB
GLUCOSE BLD MANUAL STRIP-MCNC: 143 MG/DL (ref 74–99)
GLUCOSE BLD MANUAL STRIP-MCNC: 190 MG/DL (ref 74–99)

## 2024-11-10 PROCEDURE — 2500000002 HC RX 250 W HCPCS SELF ADMINISTERED DRUGS (ALT 637 FOR MEDICARE OP, ALT 636 FOR OP/ED): Performed by: PHYSICIAN ASSISTANT

## 2024-11-10 PROCEDURE — 2500000001 HC RX 250 WO HCPCS SELF ADMINISTERED DRUGS (ALT 637 FOR MEDICARE OP): Performed by: PHYSICIAN ASSISTANT

## 2024-11-10 PROCEDURE — 2500000004 HC RX 250 GENERAL PHARMACY W/ HCPCS (ALT 636 FOR OP/ED): Performed by: PHYSICIAN ASSISTANT

## 2024-11-10 PROCEDURE — 82947 ASSAY GLUCOSE BLOOD QUANT: CPT

## 2024-11-10 ASSESSMENT — PAIN SCALES - WONG BAKER: WONGBAKER_NUMERICALRESPONSE: HURTS LITTLE BIT

## 2024-11-10 ASSESSMENT — PAIN SCALES - GENERAL: PAINLEVEL_OUTOF10: 0 - NO PAIN

## 2024-11-10 NOTE — NURSING NOTE
GASTROENTEROLOGY CLINIC FOLLOW-UP NOTE    Patient:  Live Cast Date:  2/10/2020   YOB: 1963 Primary Care Physician:  Thuy Chow MD   56 year old male        Subjective:   The patient is a 56 year old male who is being seen by Gastroenterology for history of chronic liver disease.  Patient history of hepatitis-C cirrhosis genotype 1 B urgently diagnosed in 2012.  He was treated in 2004 with interferon ribavirin but did not tolerate treatment and was stopped early.  He did not use any further drugs or alcohol since 1993. He had prior liver biopsy in 2012 indicating probable cirrhosis.  He started on by care pack and ribavirin in 5/15 and finished treatment with SVR reached, last checked 2018.  Last visit he had a fiber scan done on 04/18 which showed stage II fibrosis.  Ultrasound on 02/18 with no evidence of liver mass or cirrhosis. Reports rarely drinking but has had a few, last drink in November.      Reviewed Pertinent:   Allergies, Medical History, Surgical History, Social History, Family History and Medications    Objective:   There were no vitals taken for this visit.  General:  Patient appears alert and oriented.  Head:  Atraumatic, normocephalic.  Eyes:  No scleral icterus.  Skin:  Exposed areas appear grossly normal.  No jaundice.  Cardiovascular:  RRR (regular rate and rhythm), loud systolic murmur  Lungs:  Normal breath sounds bilaterally.  Abdomen:  Soft, nondistended, no tenderness to palpation, normal bowel sounds.  Extremities:  No pedal edema or rash or ulcers.  Neurologic:  No asterixis .      Lab Results   Component Value Date    SODIUM 141 01/31/2020    POTASSIUM 4.1 01/31/2020    CHLORIDE 109 (H) 01/31/2020    CO2 25 01/31/2020    GLUCOSE 163 (H) 01/31/2020    BUN 13 01/31/2020    CREATININE 1.00 01/31/2020    ALBUMIN 4.0 01/31/2020    BILIRUBIN 0.2 01/31/2020    LACTA 2.1 (HH) 07/05/2019    AST 11 01/31/2020    PHOS 2.6 01/08/2018    INR 1.0 07/29/2016     Lab Results  Pt received discharged instructions, personal belongings, med list, follow up appts, activity instructions. IV removed. Pt discharged with family via wheelchair by transport. Pt will return back home     Component Value Date    PTT 27 06/24/2016    WBC 6.1 07/15/2019    HGB 13.1 07/15/2019    HCT 40.3 07/15/2019     07/15/2019    BNP <5 07/13/2016    RAPDTR <0.02 07/04/2019    CRP 3.2 (H) 06/24/2016    TSH 0.555 12/20/2017       Assessment:  1. Chronic Hep C  -Genotype 1b , incomplete treatment with interferon and ribavirin due to SE in 2004  -Antibody to hepatitis A, finished Hep B vaccination  -Treated with Viekira Ubaldo plus ribavirin for 12 weeks, treatment completed 8/2015, Virus undetectable at 6 months posttreatment and last visit 2018  2.  Advanced fibrosis/probable cirrhosis   -Stage 3/4 fibrosis on liver biopsy 2012, stage II fibrosis on fiber scan 2018  -Last EGD 11/2018-No varices.  -Last ultrasound 2/2018: No evidence of cirrhosis, no lesions in liver  -No signs of decompensation    Plans/Recommendations:  Repeat INR and AFP  Repeat US of liver  Repeat EGD for esophageal variceal screening in November of 2020  Follow up in 6 months    Patient discussed with Dr. Hernandez who agrees with the above plan.    Janie Navarrete MD  Gastroenterology Fellow  Pager:  [phone]  2/10/2020

## 2024-11-10 NOTE — PROGRESS NOTES
Transitional Care Coordinator Note: Patient discussed with medical team (trauma), per trauma team patient is medically ready. Discharge dispo: Patient evaluated by therapy team rec low intensity. TCC informed and educated patient on therapy recs. Patient requested for TCC to call her son Noah Yates [phone] as he is her primary care giver in home. Per Noah patient has HHA and 24/7 assistance in home from family. TCC informed and educated Noah on homecare with PT and OT. Noah expressed understanding and declined for patient to discharge home with HC. Noah stated he would like to speak with his mother in person regarding discharge plan. TCC provided Noah with TCC contact information if patient and family would like homecare upon discharge. Trauma team updated.        Kolby Paredes RN BSN   Transitional Care Coordinator     UPDATE 11/10/2024 08:52   TCC placed call to patient's son Noah [phone] to discuss and offer resources for outpatient therapy. During call TCC spoke with both Noah and patient's daughter Jo Yates regarding outpatient therapy as family declined HC. Patient's family expressed understanding, however family declined referral and resources for outpatient therapy. Trauma team updated.     Kolby Paredes RN BSN   Transitional Care Coordinator
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
## 2024-11-10 NOTE — HH CARE COORDINATION
This referral has been made a Non Admit with  Home Care due to Patient Declines Home Care . If you have further questions, feel free to reach out to our office at [phone]. Thank you, [facility] Intake.     FAMILY DECLINED PER TCC

## 2024-11-10 NOTE — DISCHARGE INSTRUCTIONS
Please do not resume your home aspirin until 11/21/2024, orders from neurosurgery.  You can take over the counter tylenol as needed as directed for headaches.

## 2024-11-25 ENCOUNTER — APPOINTMENT (OUTPATIENT)
Dept: NEUROSURGERY | Facility: HOSPITAL | Age: 74
End: 2024-11-25
Payer: MEDICARE

## 2024-12-02 ENCOUNTER — APPOINTMENT (OUTPATIENT)
Dept: NEUROSURGERY | Facility: HOSPITAL | Age: 74
End: 2024-12-02
Payer: MEDICARE

## 2024-12-04 DIAGNOSIS — D75.1 POLYCYTHEMIA: Primary | ICD-10-CM

## 2024-12-04 RX ORDER — DIPHENHYDRAMINE HYDROCHLORIDE 50 MG/ML
50 INJECTION INTRAMUSCULAR; INTRAVENOUS AS NEEDED
OUTPATIENT
Start: 2024-12-04

## 2024-12-04 RX ORDER — FAMOTIDINE 10 MG/ML
20 INJECTION INTRAVENOUS ONCE AS NEEDED
OUTPATIENT
Start: 2024-12-04

## 2024-12-04 RX ORDER — ALBUTEROL SULFATE 0.83 MG/ML
3 SOLUTION RESPIRATORY (INHALATION) AS NEEDED
OUTPATIENT
Start: 2024-12-04

## 2024-12-04 RX ORDER — HEPARIN 100 UNIT/ML
500 SYRINGE INTRAVENOUS AS NEEDED
OUTPATIENT
Start: 2024-12-04

## 2024-12-04 RX ORDER — EPINEPHRINE 0.3 MG/.3ML
0.3 INJECTION SUBCUTANEOUS EVERY 5 MIN PRN
OUTPATIENT
Start: 2024-12-04

## 2024-12-04 RX ORDER — HEPARIN SODIUM,PORCINE/PF 10 UNIT/ML
50 SYRINGE (ML) INTRAVENOUS AS NEEDED
OUTPATIENT
Start: 2024-12-04

## 2024-12-09 ENCOUNTER — TELEPHONE (OUTPATIENT)
Dept: HEMATOLOGY/ONCOLOGY | Facility: HOSPITAL | Age: 74
End: 2024-12-09
Payer: MEDICARE

## 2024-12-09 ENCOUNTER — APPOINTMENT (OUTPATIENT)
Dept: HEMATOLOGY/ONCOLOGY | Facility: CLINIC | Age: 74
End: 2024-12-09
Payer: MEDICARE

## 2024-12-09 NOTE — TELEPHONE ENCOUNTER
Patient does not have access to a computer for her virtual appt today at 11 am.      They are cancelling the appt.      I asked if they would like to do a phone call and they state they would just like to cancel.    Message sent

## 2024-12-10 ENCOUNTER — TELEPHONE (OUTPATIENT)
Dept: HEMATOLOGY/ONCOLOGY | Facility: CLINIC | Age: 74
End: 2024-12-10
Payer: MEDICARE

## 2024-12-10 NOTE — TELEPHONE ENCOUNTER
Left message requesting patient proceed to  lab today or tomorrow for lab draw per Levy. Patient advised labs needed to proceed with phone visit on Thursday 12/12 with Levy

## 2024-12-11 ENCOUNTER — TELEPHONE (OUTPATIENT)
Dept: HEMATOLOGY/ONCOLOGY | Facility: CLINIC | Age: 74
End: 2024-12-11
Payer: MEDICARE

## 2024-12-12 ENCOUNTER — DOCUMENTATION (OUTPATIENT)
Dept: HEMATOLOGY/ONCOLOGY | Facility: HOSPITAL | Age: 74
End: 2024-12-12
Payer: MEDICARE

## 2024-12-12 ENCOUNTER — APPOINTMENT (OUTPATIENT)
Dept: HEMATOLOGY/ONCOLOGY | Facility: HOSPITAL | Age: 74
End: 2024-12-12
Payer: MEDICARE

## 2024-12-12 DIAGNOSIS — D75.1 SECONDARY POLYCYTHEMIA: Primary | ICD-10-CM

## 2024-12-12 NOTE — PROGRESS NOTES
May 8, 2024    Joce Zavala  [address]  [place] [address]             [facility]  [id number] [facility]  [place] [address]  Phone: [phone]  Fax: [phone] To Whom It May Concern,    Joce Zavala has been a patient of mine since 4/3/2023. The most recent appointment was on 5/8/2024. He is diagnosed with ADHD, combined type, Sensory Processing Disorder, and Avoidant Restrictive Feeding Intake Disorder. Their treatment includes medication, and I'm recommending that he attend therapy. Additionally, he is referred for psychological testing, as his symptoms and behavior may be better explained by Autism Spectrum Disorder. His symptoms are moderate in severity. Medications have offered some benefit but have also caused side effects.      It is my clinical opinion that Joce Zavala is eligible for and would benefit from [facility] accommodations as their symptoms of the above conditions significantly impair their ability to function and learn in the traditional classroom setting.       Interventions I recommend include:  -A Functional Behavior Anaylsis (FBA), and a Behavior Intervention Plan preferred by a Board Certified Behavior Analyst  -Preferential Seating  -Small group learning when possible  -Clear and explicit communication: Providing clear instructions and using straightforward language will assist Joce in understanding tasks and expectations.  -Communication with Joce, especially during stressful times is done with a clear, calm voice, and demeanor.   -Establishing a designated safe space: Offering a quiet and calm area where Joce can retreat when feeling overwhelmed can be immensely helpful. Alternatively, easy access to coping mechanisms and stress relieving devices is helpful.  -Masking demands (assignements given and direct instructions to change behavior) as much as possible using humor, song, or giving Joce a choice of acceptable options can help  Patient did not have labs drawn prior to today's visit as planned. Therefore, patient considered a no show. This note created for purposes of placing rescheduling orders and request to remind patient to have labs drawn prior to visit.     reduce the fear/anger response which is sometimes caused by a perceived lack of autonomy.    Please contact me with any questions or concerns.      Sincerely,        Syed Ruiz MD  [facility]

## 2024-12-20 ENCOUNTER — APPOINTMENT (OUTPATIENT)
Dept: PRIMARY CARE | Facility: CLINIC | Age: 74
End: 2024-12-20
Payer: MEDICARE

## 2025-01-15 ENCOUNTER — APPOINTMENT (OUTPATIENT)
Dept: NEPHROLOGY | Facility: CLINIC | Age: 75
End: 2025-01-15

## 2025-01-29 ENCOUNTER — APPOINTMENT (OUTPATIENT)
Dept: ENDOCRINOLOGY | Facility: CLINIC | Age: 75
End: 2025-01-29
Payer: MEDICARE

## 2025-06-03 ENCOUNTER — HOSPITAL ENCOUNTER (INPATIENT)
Facility: HOSPITAL | Age: 75
LOS: 2 days | Discharge: HOME | End: 2025-06-05
Attending: EMERGENCY MEDICINE | Admitting: STUDENT IN AN ORGANIZED HEALTH CARE EDUCATION/TRAINING PROGRAM
Payer: MEDICARE

## 2025-06-03 ENCOUNTER — APPOINTMENT (OUTPATIENT)
Dept: RADIOLOGY | Facility: HOSPITAL | Age: 75
End: 2025-06-03
Payer: MEDICARE

## 2025-06-03 DIAGNOSIS — N18.9 CHRONIC KIDNEY DISEASE, UNSPECIFIED CKD STAGE: ICD-10-CM

## 2025-06-03 DIAGNOSIS — I67.4 HYPERTENSIVE ENCEPHALOPATHY: ICD-10-CM

## 2025-06-03 DIAGNOSIS — I67.2 INTRACRANIAL ATHEROSCLEROSIS: ICD-10-CM

## 2025-06-03 DIAGNOSIS — I63.9 CEREBROVASCULAR ACCIDENT (CVA), UNSPECIFIED MECHANISM (MULTI): ICD-10-CM

## 2025-06-03 DIAGNOSIS — R41.82 ALTERED MENTAL STATUS, UNSPECIFIED ALTERED MENTAL STATUS TYPE: Primary | ICD-10-CM

## 2025-06-03 DIAGNOSIS — I10 ESSENTIAL HYPERTENSION: ICD-10-CM

## 2025-06-03 DIAGNOSIS — R41.0 CONFUSION: ICD-10-CM

## 2025-06-03 LAB
25(OH)D3 SERPL-MCNC: 49 NG/ML (ref 30–100)
ALBUMIN SERPL BCP-MCNC: 3.4 G/DL (ref 3.4–5)
ALBUMIN SERPL BCP-MCNC: 4.7 G/DL (ref 3.4–5)
ALP SERPL-CCNC: 121 U/L (ref 33–136)
ALT SERPL W P-5'-P-CCNC: 15 U/L (ref 7–45)
ANION GAP BLDV CALCULATED.4IONS-SCNC: 13 MMOL/L (ref 10–25)
ANION GAP SERPL CALC-SCNC: 13 MMOL/L (ref 10–20)
ANION GAP SERPL CALC-SCNC: 15 MMOL/L (ref 10–20)
APPEARANCE UR: ABNORMAL
AST SERPL W P-5'-P-CCNC: 24 U/L (ref 9–39)
BASE EXCESS BLDV CALC-SCNC: 0.7 MMOL/L (ref -2–3)
BASOPHILS # BLD AUTO: 0.05 X10*3/UL (ref 0–0.1)
BASOPHILS NFR BLD AUTO: 0.7 %
BILIRUB SERPL-MCNC: 1.1 MG/DL (ref 0–1.2)
BILIRUB UR STRIP.AUTO-MCNC: NEGATIVE MG/DL
BODY TEMPERATURE: 37 DEGREES CELSIUS
BUN SERPL-MCNC: 30 MG/DL (ref 6–23)
BUN SERPL-MCNC: 33 MG/DL (ref 6–23)
CA-I BLDV-SCNC: 1.16 MMOL/L (ref 1.1–1.33)
CALCIUM SERPL-MCNC: 11.3 MG/DL (ref 8.6–10.6)
CALCIUM SERPL-MCNC: 9.8 MG/DL (ref 8.6–10.6)
CARDIAC TROPONIN I PNL SERPL HS: 131 NG/L (ref 0–34)
CARDIAC TROPONIN I PNL SERPL HS: 158 NG/L (ref 0–34)
CARDIAC TROPONIN I PNL SERPL HS: 45 NG/L (ref 0–34)
CARDIAC TROPONIN I PNL SERPL HS: 57 NG/L (ref 0–34)
CARDIAC TROPONIN I PNL SERPL HS: 86 NG/L (ref 0–34)
CHLORIDE BLDV-SCNC: 106 MMOL/L (ref 98–107)
CHLORIDE SERPL-SCNC: 101 MMOL/L (ref 98–107)
CHLORIDE SERPL-SCNC: 108 MMOL/L (ref 98–107)
CHOLEST SERPL-MCNC: 236 MG/DL (ref 0–199)
CHOLESTEROL/HDL RATIO: 2.2
CO2 SERPL-SCNC: 25 MMOL/L (ref 21–32)
CO2 SERPL-SCNC: 32 MMOL/L (ref 21–32)
COLOR UR: COLORLESS
CREAT SERPL-MCNC: 1.56 MG/DL (ref 0.5–1.05)
CREAT SERPL-MCNC: 1.7 MG/DL (ref 0.5–1.05)
EGFRCR SERPLBLD CKD-EPI 2021: 31 ML/MIN/1.73M*2
EGFRCR SERPLBLD CKD-EPI 2021: 34 ML/MIN/1.73M*2
EOSINOPHIL # BLD AUTO: 0.19 X10*3/UL (ref 0–0.4)
EOSINOPHIL NFR BLD AUTO: 2.7 %
ERYTHROCYTE [DISTWIDTH] IN BLOOD BY AUTOMATED COUNT: 12.5 % (ref 11.5–14.5)
ERYTHROCYTE [DISTWIDTH] IN BLOOD BY AUTOMATED COUNT: 12.5 % (ref 11.5–14.5)
EST. AVERAGE GLUCOSE BLD GHB EST-MCNC: 203 MG/DL
FOLATE SERPL-MCNC: 17.6 NG/ML
GLUCOSE BLD MANUAL STRIP-MCNC: 183 MG/DL (ref 74–99)
GLUCOSE BLD MANUAL STRIP-MCNC: 217 MG/DL (ref 74–99)
GLUCOSE BLD MANUAL STRIP-MCNC: 257 MG/DL (ref 74–99)
GLUCOSE BLDV-MCNC: 194 MG/DL (ref 74–99)
GLUCOSE SERPL-MCNC: 197 MG/DL (ref 74–99)
GLUCOSE SERPL-MCNC: 243 MG/DL (ref 74–99)
GLUCOSE UR STRIP.AUTO-MCNC: ABNORMAL MG/DL
HBA1C MFR BLD: 8.7 % (ref ?–5.7)
HCO3 BLDV-SCNC: 26.6 MMOL/L (ref 22–26)
HCT VFR BLD AUTO: 55.9 % (ref 36–46)
HCT VFR BLD AUTO: 55.9 % (ref 36–46)
HCT VFR BLD EST: 46 % (ref 36–46)
HDLC SERPL-MCNC: 104.9 MG/DL
HGB BLD-MCNC: 18.6 G/DL (ref 12–16)
HGB BLD-MCNC: 18.6 G/DL (ref 12–16)
HGB BLDV-MCNC: 15.2 G/DL (ref 12–16)
HIV 1+2 AB+HIV1 P24 AG SERPL QL IA: NONREACTIVE
HOLD SPECIMEN: NORMAL
IMM GRANULOCYTES # BLD AUTO: 0.01 X10*3/UL (ref 0–0.5)
IMM GRANULOCYTES NFR BLD AUTO: 0.1 % (ref 0–0.9)
KETONES UR STRIP.AUTO-MCNC: NEGATIVE MG/DL
LACTATE BLDV-SCNC: 2.3 MMOL/L (ref 0.4–2)
LEUKOCYTE ESTERASE UR QL STRIP.AUTO: NEGATIVE
LYMPHOCYTES # BLD AUTO: 2.46 X10*3/UL (ref 0.8–3)
LYMPHOCYTES NFR BLD AUTO: 34.5 %
MCH RBC QN AUTO: 29.9 PG (ref 26–34)
MCH RBC QN AUTO: 29.9 PG (ref 26–34)
MCHC RBC AUTO-ENTMCNC: 33.3 G/DL (ref 32–36)
MCHC RBC AUTO-ENTMCNC: 33.3 G/DL (ref 32–36)
MCV RBC AUTO: 90 FL (ref 80–100)
MCV RBC AUTO: 90 FL (ref 80–100)
MONOCYTES # BLD AUTO: 0.5 X10*3/UL (ref 0.05–0.8)
MONOCYTES NFR BLD AUTO: 7 %
NEUTROPHILS # BLD AUTO: 3.93 X10*3/UL (ref 1.6–5.5)
NEUTROPHILS NFR BLD AUTO: 55 %
NITRITE UR QL STRIP.AUTO: NEGATIVE
NON-HDL CHOLESTEROL: 131 MG/DL (ref 0–149)
NRBC BLD-RTO: 0 /100 WBCS (ref 0–0)
NRBC BLD-RTO: 0 /100 WBCS (ref 0–0)
OXYHGB MFR BLDV: 61.5 % (ref 45–75)
PCO2 BLDV: 46 MM HG (ref 41–51)
PH BLDV: 7.37 PH (ref 7.33–7.43)
PH UR STRIP.AUTO: 7.5 [PH]
PHOSPHATE SERPL-MCNC: 2.9 MG/DL (ref 2.5–4.9)
PHOSPHATE SERPL-MCNC: 3.1 MG/DL (ref 2.5–4.9)
PLATELET # BLD AUTO: 252 X10*3/UL (ref 150–450)
PLATELET # BLD AUTO: 252 X10*3/UL (ref 150–450)
PO2 BLDV: 37 MM HG (ref 35–45)
POTASSIUM BLDV-SCNC: 3.1 MMOL/L (ref 3.5–5.3)
POTASSIUM SERPL-SCNC: 3.7 MMOL/L (ref 3.5–5.3)
POTASSIUM SERPL-SCNC: 4.4 MMOL/L (ref 3.5–5.3)
PROT SERPL-MCNC: 9.4 G/DL (ref 6.4–8.2)
PROT UR STRIP.AUTO-MCNC: ABNORMAL MG/DL
PTH-INTACT SERPL-MCNC: 161.2 PG/ML (ref 18.5–88)
RBC # BLD AUTO: 6.22 X10*6/UL (ref 4–5.2)
RBC # BLD AUTO: 6.22 X10*6/UL (ref 4–5.2)
RBC # UR STRIP.AUTO: NEGATIVE MG/DL
RBC #/AREA URNS AUTO: NORMAL /HPF
SAO2 % BLDV: 63 % (ref 45–75)
SODIUM BLDV-SCNC: 142 MMOL/L (ref 136–145)
SODIUM SERPL-SCNC: 142 MMOL/L (ref 136–145)
SODIUM SERPL-SCNC: 144 MMOL/L (ref 136–145)
SP GR UR STRIP.AUTO: 1.02
SQUAMOUS #/AREA URNS AUTO: NORMAL /HPF
T4 FREE SERPL-MCNC: 0.96 NG/DL (ref 0.78–1.48)
TREPONEMA PALLIDUM IGG+IGM AB [PRESENCE] IN SERUM OR PLASMA BY IMMUNOASSAY: NONREACTIVE
TSH SERPL-ACNC: 0.38 MIU/L (ref 0.44–3.98)
UROBILINOGEN UR STRIP.AUTO-MCNC: NORMAL MG/DL
VIT B12 SERPL-MCNC: 915 PG/ML (ref 211–911)
WBC # BLD AUTO: 7.3 X10*3/UL (ref 4.4–11.3)
WBC # BLD AUTO: 7.3 X10*3/UL (ref 4.4–11.3)
WBC #/AREA URNS AUTO: NORMAL /HPF

## 2025-06-03 PROCEDURE — 82306 VITAMIN D 25 HYDROXY: CPT

## 2025-06-03 PROCEDURE — 84132 ASSAY OF SERUM POTASSIUM: CPT

## 2025-06-03 PROCEDURE — 84439 ASSAY OF FREE THYROXINE: CPT

## 2025-06-03 PROCEDURE — 82330 ASSAY OF CALCIUM: CPT

## 2025-06-03 PROCEDURE — 36415 COLL VENOUS BLD VENIPUNCTURE: CPT | Performed by: EMERGENCY MEDICINE

## 2025-06-03 PROCEDURE — 2500000002 HC RX 250 W HCPCS SELF ADMINISTERED DRUGS (ALT 637 FOR MEDICARE OP, ALT 636 FOR OP/ED)

## 2025-06-03 PROCEDURE — 83970 ASSAY OF PARATHORMONE: CPT

## 2025-06-03 PROCEDURE — 87389 HIV-1 AG W/HIV-1&-2 AB AG IA: CPT

## 2025-06-03 PROCEDURE — 2500000004 HC RX 250 GENERAL PHARMACY W/ HCPCS (ALT 636 FOR OP/ED): Performed by: EMERGENCY MEDICINE

## 2025-06-03 PROCEDURE — 96375 TX/PRO/DX INJ NEW DRUG ADDON: CPT

## 2025-06-03 PROCEDURE — 85025 COMPLETE CBC W/AUTO DIFF WBC: CPT | Performed by: EMERGENCY MEDICINE

## 2025-06-03 PROCEDURE — 70450 CT HEAD/BRAIN W/O DYE: CPT | Performed by: RADIOLOGY

## 2025-06-03 PROCEDURE — 70551 MRI BRAIN STEM W/O DYE: CPT | Performed by: RADIOLOGY

## 2025-06-03 PROCEDURE — 86780 TREPONEMA PALLIDUM: CPT

## 2025-06-03 PROCEDURE — 2500000004 HC RX 250 GENERAL PHARMACY W/ HCPCS (ALT 636 FOR OP/ED)

## 2025-06-03 PROCEDURE — 84484 ASSAY OF TROPONIN QUANT: CPT

## 2025-06-03 PROCEDURE — 83036 HEMOGLOBIN GLYCOSYLATED A1C: CPT

## 2025-06-03 PROCEDURE — 84443 ASSAY THYROID STIM HORMONE: CPT

## 2025-06-03 PROCEDURE — 1200000002 HC GENERAL ROOM WITH TELEMETRY DAILY

## 2025-06-03 PROCEDURE — 2550000001 HC RX 255 CONTRASTS: Performed by: EMERGENCY MEDICINE

## 2025-06-03 PROCEDURE — 82607 VITAMIN B-12: CPT

## 2025-06-03 PROCEDURE — 82947 ASSAY GLUCOSE BLOOD QUANT: CPT

## 2025-06-03 PROCEDURE — 93010 ELECTROCARDIOGRAM REPORT: CPT | Performed by: EMERGENCY MEDICINE

## 2025-06-03 PROCEDURE — 70496 CT ANGIOGRAPHY HEAD: CPT | Performed by: RADIOLOGY

## 2025-06-03 PROCEDURE — 96374 THER/PROPH/DIAG INJ IV PUSH: CPT

## 2025-06-03 PROCEDURE — 83718 ASSAY OF LIPOPROTEIN: CPT

## 2025-06-03 PROCEDURE — 82435 ASSAY OF BLOOD CHLORIDE: CPT

## 2025-06-03 PROCEDURE — 99291 CRITICAL CARE FIRST HOUR: CPT | Performed by: EMERGENCY MEDICINE

## 2025-06-03 PROCEDURE — 70498 CT ANGIOGRAPHY NECK: CPT

## 2025-06-03 PROCEDURE — 80053 COMPREHEN METABOLIC PANEL: CPT | Performed by: EMERGENCY MEDICINE

## 2025-06-03 PROCEDURE — 81003 URINALYSIS AUTO W/O SCOPE: CPT

## 2025-06-03 PROCEDURE — 84100 ASSAY OF PHOSPHORUS: CPT

## 2025-06-03 PROCEDURE — 70551 MRI BRAIN STEM W/O DYE: CPT

## 2025-06-03 PROCEDURE — 2500000001 HC RX 250 WO HCPCS SELF ADMINISTERED DRUGS (ALT 637 FOR MEDICARE OP): Performed by: STUDENT IN AN ORGANIZED HEALTH CARE EDUCATION/TRAINING PROGRAM

## 2025-06-03 PROCEDURE — 82465 ASSAY BLD/SERUM CHOLESTEROL: CPT

## 2025-06-03 PROCEDURE — 82746 ASSAY OF FOLIC ACID SERUM: CPT

## 2025-06-03 PROCEDURE — 70450 CT HEAD/BRAIN W/O DYE: CPT

## 2025-06-03 PROCEDURE — 99223 1ST HOSP IP/OBS HIGH 75: CPT

## 2025-06-03 PROCEDURE — 2500000001 HC RX 250 WO HCPCS SELF ADMINISTERED DRUGS (ALT 637 FOR MEDICARE OP)

## 2025-06-03 PROCEDURE — 84484 ASSAY OF TROPONIN QUANT: CPT | Performed by: EMERGENCY MEDICINE

## 2025-06-03 PROCEDURE — 99222 1ST HOSP IP/OBS MODERATE 55: CPT

## 2025-06-03 PROCEDURE — 36415 COLL VENOUS BLD VENIPUNCTURE: CPT

## 2025-06-03 PROCEDURE — 81001 URINALYSIS AUTO W/SCOPE: CPT

## 2025-06-03 PROCEDURE — 70498 CT ANGIOGRAPHY NECK: CPT | Performed by: RADIOLOGY

## 2025-06-03 PROCEDURE — 2500000002 HC RX 250 W HCPCS SELF ADMINISTERED DRUGS (ALT 637 FOR MEDICARE OP, ALT 636 FOR OP/ED): Performed by: STUDENT IN AN ORGANIZED HEALTH CARE EDUCATION/TRAINING PROGRAM

## 2025-06-03 PROCEDURE — 85027 COMPLETE CBC AUTOMATED: CPT | Performed by: EMERGENCY MEDICINE

## 2025-06-03 RX ORDER — ACETAMINOPHEN 500 MG
10 TABLET ORAL NIGHTLY
Status: DISCONTINUED | OUTPATIENT
Start: 2025-06-03 | End: 2025-06-05 | Stop reason: HOSPADM

## 2025-06-03 RX ORDER — DEXTROSE 50 % IN WATER (D50W) INTRAVENOUS SYRINGE
25
Status: DISCONTINUED | OUTPATIENT
Start: 2025-06-03 | End: 2025-06-05 | Stop reason: HOSPADM

## 2025-06-03 RX ORDER — AMLODIPINE BESYLATE 5 MG/1
5 TABLET ORAL DAILY
Status: DISCONTINUED | OUTPATIENT
Start: 2025-06-03 | End: 2025-06-05 | Stop reason: HOSPADM

## 2025-06-03 RX ORDER — LABETALOL HYDROCHLORIDE 5 MG/ML
20 INJECTION, SOLUTION INTRAVENOUS ONCE
Status: COMPLETED | OUTPATIENT
Start: 2025-06-03 | End: 2025-06-03

## 2025-06-03 RX ORDER — LABETALOL HYDROCHLORIDE 5 MG/ML
INJECTION, SOLUTION INTRAVENOUS
Status: COMPLETED
Start: 2025-06-03 | End: 2025-06-03

## 2025-06-03 RX ORDER — LABETALOL HYDROCHLORIDE 5 MG/ML
10 INJECTION, SOLUTION INTRAVENOUS ONCE
Status: COMPLETED | OUTPATIENT
Start: 2025-06-03 | End: 2025-06-03

## 2025-06-03 RX ORDER — DEXTROSE 50 % IN WATER (D50W) INTRAVENOUS SYRINGE
12.5
Status: DISCONTINUED | OUTPATIENT
Start: 2025-06-03 | End: 2025-06-05 | Stop reason: HOSPADM

## 2025-06-03 RX ORDER — HEPARIN SODIUM 5000 [USP'U]/ML
5000 INJECTION, SOLUTION INTRAVENOUS; SUBCUTANEOUS EVERY 8 HOURS
Status: DISCONTINUED | OUTPATIENT
Start: 2025-06-03 | End: 2025-06-05 | Stop reason: HOSPADM

## 2025-06-03 RX ORDER — INSULIN LISPRO 100 [IU]/ML
0-5 INJECTION, SOLUTION INTRAVENOUS; SUBCUTANEOUS
Status: DISCONTINUED | OUTPATIENT
Start: 2025-06-03 | End: 2025-06-04

## 2025-06-03 RX ORDER — POLYETHYLENE GLYCOL 3350 17 G/17G
17 POWDER, FOR SOLUTION ORAL DAILY PRN
Status: DISCONTINUED | OUTPATIENT
Start: 2025-06-03 | End: 2025-06-05 | Stop reason: HOSPADM

## 2025-06-03 RX ORDER — LISINOPRIL 10 MG/1
40 TABLET ORAL DAILY
Status: DISCONTINUED | OUTPATIENT
Start: 2025-06-03 | End: 2025-06-05 | Stop reason: HOSPADM

## 2025-06-03 RX ORDER — SODIUM CHLORIDE 9 MG/ML
INJECTION, SOLUTION INTRAVENOUS
Status: COMPLETED | OUTPATIENT
Start: 2025-06-03 | End: 2025-06-03

## 2025-06-03 RX ORDER — ATORVASTATIN CALCIUM 40 MG/1
40 TABLET, FILM COATED ORAL
Status: DISCONTINUED | OUTPATIENT
Start: 2025-06-03 | End: 2025-06-05 | Stop reason: HOSPADM

## 2025-06-03 RX ORDER — ATORVASTATIN CALCIUM 40 MG/1
1 TABLET, FILM COATED ORAL
Status: ON HOLD | COMMUNITY
Start: 2025-04-17 | End: 2025-06-05

## 2025-06-03 RX ORDER — METOPROLOL SUCCINATE 50 MG/1
100 TABLET, EXTENDED RELEASE ORAL DAILY
Status: DISCONTINUED | OUTPATIENT
Start: 2025-06-03 | End: 2025-06-05 | Stop reason: HOSPADM

## 2025-06-03 RX ORDER — QUETIAPINE FUMARATE 25 MG/1
25 TABLET, FILM COATED ORAL DAILY PRN
Status: DISCONTINUED | OUTPATIENT
Start: 2025-06-03 | End: 2025-06-05 | Stop reason: HOSPADM

## 2025-06-03 RX ORDER — LATANOPROST 50 UG/ML
1 SOLUTION/ DROPS OPHTHALMIC NIGHTLY
Status: DISCONTINUED | OUTPATIENT
Start: 2025-06-03 | End: 2025-06-05 | Stop reason: HOSPADM

## 2025-06-03 RX ORDER — ASPIRIN 81 MG/1
81 TABLET ORAL DAILY
Status: DISCONTINUED | OUTPATIENT
Start: 2025-06-03 | End: 2025-06-05 | Stop reason: HOSPADM

## 2025-06-03 RX ORDER — LORAZEPAM 2 MG/ML
1 INJECTION INTRAMUSCULAR ONCE
Status: COMPLETED | OUTPATIENT
Start: 2025-06-03 | End: 2025-06-03

## 2025-06-03 RX ADMIN — AMLODIPINE BESYLATE 5 MG: 5 TABLET ORAL at 13:49

## 2025-06-03 RX ADMIN — LATANOPROST 1 DROP: 50 SOLUTION OPHTHALMIC at 22:20

## 2025-06-03 RX ADMIN — HEPARIN SODIUM 5000 UNITS: 5000 INJECTION, SOLUTION INTRAVENOUS; SUBCUTANEOUS at 13:49

## 2025-06-03 RX ADMIN — Medication 10 MG: at 22:17

## 2025-06-03 RX ADMIN — HEPARIN SODIUM 5000 UNITS: 5000 INJECTION, SOLUTION INTRAVENOUS; SUBCUTANEOUS at 22:17

## 2025-06-03 RX ADMIN — LISINOPRIL 40 MG: 20 TABLET ORAL at 13:49

## 2025-06-03 RX ADMIN — LABETALOL HYDROCHLORIDE 20 MG: 5 INJECTION, SOLUTION INTRAVENOUS at 09:32

## 2025-06-03 RX ADMIN — QUETIAPINE FUMARATE 25 MG: 25 TABLET ORAL at 23:27

## 2025-06-03 RX ADMIN — ATORVASTATIN CALCIUM 40 MG: 40 TABLET, FILM COATED ORAL at 13:49

## 2025-06-03 RX ADMIN — ASPIRIN 81 MG: 81 TABLET, COATED ORAL at 13:49

## 2025-06-03 RX ADMIN — LABETALOL HYDROCHLORIDE 10 MG: 5 INJECTION, SOLUTION INTRAVENOUS at 09:04

## 2025-06-03 RX ADMIN — LABETALOL HYDROCHLORIDE 20 MG: 5 INJECTION, SOLUTION INTRAVENOUS at 09:18

## 2025-06-03 RX ADMIN — LABETALOL HYDROCHLORIDE 10 MG: 5 INJECTION INTRAVENOUS at 09:04

## 2025-06-03 RX ADMIN — INSULIN LISPRO 2 UNITS: 100 INJECTION, SOLUTION INTRAVENOUS; SUBCUTANEOUS at 16:30

## 2025-06-03 RX ADMIN — LABETALOL HYDROCHLORIDE 10 MG: 5 INJECTION, SOLUTION INTRAVENOUS at 09:45

## 2025-06-03 RX ADMIN — SODIUM CHLORIDE 999 ML/HR: 9 INJECTION, SOLUTION INTRAVENOUS at 16:53

## 2025-06-03 RX ADMIN — LORAZEPAM 1 MG: 2 INJECTION INTRAMUSCULAR; INTRAVENOUS at 09:45

## 2025-06-03 RX ADMIN — SODIUM CHLORIDE 1000 ML: 0.9 INJECTION, SOLUTION INTRAVENOUS at 14:04

## 2025-06-03 RX ADMIN — METOPROLOL SUCCINATE 100 MG: 50 TABLET, EXTENDED RELEASE ORAL at 13:49

## 2025-06-03 RX ADMIN — IOHEXOL 75 ML: 350 INJECTION, SOLUTION INTRAVENOUS at 08:47

## 2025-06-03 SDOH — ECONOMIC STABILITY: FOOD INSECURITY: WITHIN THE PAST 12 MONTHS, YOU WORRIED THAT YOUR FOOD WOULD RUN OUT BEFORE YOU GOT THE MONEY TO BUY MORE.: NEVER TRUE

## 2025-06-03 SDOH — ECONOMIC STABILITY: FOOD INSECURITY: WITHIN THE PAST 12 MONTHS, YOU WORRIED THAT YOUR FOOD WOULD RUN OUT BEFORE YOU GOT MONEY TO BUY MORE.: NEVER TRUE

## 2025-06-03 SDOH — ECONOMIC STABILITY: INCOME INSECURITY: IN THE LAST 12 MONTHS, WAS THERE A TIME WHEN YOU WERE NOT ABLE TO PAY THE MORTGAGE OR RENT ON TIME?: NO

## 2025-06-03 SDOH — ECONOMIC STABILITY: FOOD INSECURITY: WITHIN THE PAST 12 MONTHS, THE FOOD YOU BOUGHT JUST DIDN'T LAST AND YOU DIDN'T HAVE MONEY TO GET MORE.: NEVER TRUE

## 2025-06-03 SDOH — ECONOMIC STABILITY: TRANSPORTATION INSECURITY
IN THE PAST 12 MONTHS, HAS LACK OF TRANSPORTATION KEPT YOU FROM MEETINGS, WORK, OR FROM GETTING THINGS NEEDED FOR DAILY LIVING?: NO

## 2025-06-03 SDOH — ECONOMIC STABILITY: HOUSING INSECURITY: IN THE PAST 12 MONTHS HAS THE ELECTRIC, GAS, OIL, OR WATER COMPANY THREATENED TO SHUT OFF SERVICES IN YOUR HOME?: NO

## 2025-06-03 SDOH — ECONOMIC STABILITY: HOUSING INSECURITY
IN THE LAST 12 MONTHS, WAS THERE A TIME WHEN YOU DID NOT HAVE A STEADY PLACE TO SLEEP OR SLEPT IN A SHELTER (INCLUDING NOW)?: NO

## 2025-06-03 SDOH — ECONOMIC STABILITY: TRANSPORTATION INSECURITY
IN THE PAST 12 MONTHS, HAS THE LACK OF TRANSPORTATION KEPT YOU FROM MEDICAL APPOINTMENTS OR FROM GETTING MEDICATIONS?: NO

## 2025-06-03 SDOH — ECONOMIC STABILITY: HOUSING INSECURITY

## 2025-06-03 SDOH — ECONOMIC STABILITY: TRANSPORTATION INSECURITY

## 2025-06-03 SDOH — ECONOMIC STABILITY: HOUSING INSECURITY: IN THE LAST 12 MONTHS, HOW MANY PLACES HAVE YOU LIVED?: 1

## 2025-06-03 SDOH — ECONOMIC STABILITY: FOOD INSECURITY

## 2025-06-03 SDOH — ECONOMIC STABILITY: GENERAL

## 2025-06-03 SDOH — ECONOMIC STABILITY: HOUSING INSECURITY: IN THE LAST 12 MONTHS, WAS THERE A TIME WHEN YOU WERE NOT ABLE TO PAY THE MORTGAGE OR RENT ON TIME?: NO

## 2025-06-03 SDOH — ECONOMIC STABILITY: TRANSPORTATION INSECURITY: IN THE PAST 12 MONTHS, HAS LACK OF TRANSPORTATION KEPT YOU FROM MEDICAL APPOINTMENTS OR FROM GETTING MEDICATIONS?: NO

## 2025-06-03 ASSESSMENT — PAIN SCALES - GENERAL: PAINLEVEL_OUTOF10: 0 - NO PAIN

## 2025-06-03 ASSESSMENT — HEART SCORE
ECG: NORMAL
HEART SCORE: 5
HISTORY: SLIGHTLY SUSPICIOUS
RISK FACTORS: >2 RISK FACTORS OR HX OF ATHEROSCLEROTIC DISEASE
AGE: 65+
TROPONIN: 1-3 TIMES NORMAL LIMIT

## 2025-06-03 ASSESSMENT — PAIN - FUNCTIONAL ASSESSMENT: PAIN_FUNCTIONAL_ASSESSMENT: 0-10

## 2025-06-03 ASSESSMENT — SOCIAL DETERMINANTS OF HEALTH (SDOH): IN THE PAST 12 MONTHS, HAS THE ELECTRIC, GAS, OIL, OR WATER COMPANY THREATENED TO SHUT OFF SERVICE IN YOUR HOME?: NO

## 2025-06-03 ASSESSMENT — ACTIVITIES OF DAILY LIVING (ADL): LACK_OF_TRANSPORTATION: NO

## 2025-06-03 NOTE — PROGRESS NOTES
Saige Yates is a 74 y.o. female on day 0 of admission presenting with Altered mental status, unspecified altered mental status type.    Attempted assessment with patient. Patient suggested I reach out to daughter for information. [facility] left  for Jo Yates (DTR) [phone] with request for call back.    Lizett Cortez [facility]

## 2025-06-03 NOTE — ED TRIAGE NOTES
Pt brought in by [facility] for stroke symptoms. LKW was around 2130 lastnight before she went to bed. Per squad pt woke up about 0630 with slurred speech and confusion. Pt is A&Ox2, unsure baseline, blood sugar was 257 on arrival. Pt does have a PMH of HTN, and a TIA. BAT was activated and pt was taken straight to CT,NIH=6.

## 2025-06-03 NOTE — ED PROVIDER NOTES
Emergency Department Provider Note        History of Present Illness     History provided by: Family Member and EMS  Limitations to History: Altered Mental Status  External Records Reviewed with Brief Summary: Discharge Summary from 11/10/2024 which showed patient sustained a traumatic subdural hematoma after a fall, neurosurgery did not feel she required any acute intervention, was restarted on her aspirin and other home medications and ultimately discharged home at baseline neurostatus    HPI:  Saige Yates is a 74 y.o. female with history of prior CVA, CKD, type 2 diabetes, heart failure with reduced EF, breast cancer, hypertension, hyperlipidemia, dementia, tSDH (11/2024) who presents as a BAT from home via EMS.  Last known well time was 9:30 last night.  She woke up this morning about 6:30 AM with symptoms.  Family noted slurred speech and called EMS approximately 1 hour after symptoms were noted.  She was found to be hypertensive to 201/147 with a blood sugar of 302 en route.  Daughter and son note that she has otherwise been in her usual state of health recently, with no fevers, cough, abdominal pain, nausea, vomiting, diarrhea, or constipation.  At baseline, she lives with both of them, but requires minimal assistance with ADLs.  Her son notes that she has been taking her lisinopril daily, however has been out of her other antihypertensive medications and he has been trying to get refills from her doctor.  He notes that she has not been on aspirin in quite some time.    Last Known Well Time: 9:30PM last night    Physical Exam   Triage vitals:  T 37.1 °C (98.7 °F)  HR (!) 120  BP (!) 197/144  RR 15  O2 97 % None (Room air)    General: Awake, alert, in no acute distress  Eyes: Gaze conjugate.  No scleral icterus or injection  HENT: Normo-cephalic, atraumatic. No stridor.  CV: Regular rate, regular rhythm. Radial pulses 2+ bilaterally  Resp: Breathing non-labored, speaking in full sentences.  Clear to  auscultation bilaterally  GI: Soft, non-distended, non-tender. No rebound or guarding.  MSK/Extremities: No gross bony deformities. Moving all extremities  Skin: Warm. Appropriate color  Neuro: see below for NIHSS  Psych: Appropriate mood and affect    NIH Stroke Scale    [facility] NIHSS:   NIH Stroke Scale:     Date/Time of Assessment: 6/3/2025 9:15 AM    1A. Level of Consciousness:  Alert (keenly responsive) (0)    1B. Ask Month and Age:  Both questions right (0)    1C. Blink Eyes & Squeeze Hands:  Performs both tasks (0)    2. Best Gaze:  Normal (0)    3. Visual:  Complete hemianopia (+2)    4. Facial Palsy:  Normal symmetry (0)    5A. Motor - Left Arm:  No drift (0)    5B. Motor - Right Arm:  No drift (0)    6A. Motor - Left Leg:  Drift (+1)    6B. Motor - Right Leg:  No drift (0)    7. Limb Ataxia:  No ataxia (0)    8. Sensory Loss:  Mild-moderate loss (+1)    9. Best Language:  Normal (no aphasia) (0)    10. Dysarthia:  Mild-moderate dysarthria (+1)    11. Extinction and Inattention:  Visual/tactile/auditory/spatial/personal inattention (+1)    NIH Stroke Scale:  6      VAN: Positive    Medical Decision Making & ED Course   Medical Decision Makin y.o. female with history of prior CVA, CKD, type 2 diabetes, heart failure with reduced EF, breast cancer, hypertension, hyperlipidemia, dementia, tSDH (2024) who presents as a BAT from home with initial NIHSS of 6.  CT head without contrast did not reveal any hemorrhage.  She was VAN positive and CT angio was also obtained. No large vessel occlusion, aneurysm, or high-grade stenosis was identified.  Chart was reviewed with neurology to determine TNK eligibility, and the decision was made to obtain MR by wake-up stroke protocol in order to determine if findings were consistent with early stroke.  Although MRI was significantly affected by motion artifact, no areas of restricted diffusion were identified to suggest ischemia and TNK was not administered.  Throughout  the code stroke, she was treated with IV labetalol for blood pressure control.  She was also given 1 mg of Ativan and MR to facilitate imaging.    Other etiologies of her neurological symptoms were considered, including infectious sources, although she did not have any obvious signs or symptoms.  Urinalysis did not reveal UTI.  CBC did appear hemoconcentrated with a hemoglobin of 18.6 and hematocrit of 55.9, however no leukocytosis was noted and platelets were normal.  CMP revealed a stable creatinine of 1.70 and no major electrolyte disturbances.  She was noted to be hyperglycemic to 243.  Troponin was slightly elevated at 45, and repeat did uptrend to 57.  EKG was nonischemic and she did not report chest pain at any time.  Although her home  aspirin should be restarted, she was not initially awake enough to pass a swallow evaluation in the ED for this to be initiated, and I have low suspicion for ACS. However, she did eventually become more alert and was given her home medications.  On reevaluation, while she was more alert, she did continue to exhibit neurologic deficits.  She was discussed with admissions coordinators and ultimately excepted to admission to the medicine service for continued management of presumed hypertensive encephalopathy.  Son and daughter at bedside were updated on plan of care as well.    ----  Scoring Tools Utilized: HEART Score: 5       Differential diagnoses considered include but are not limited to: Ischemic stroke, hemorrhagic stroke, metabolic encephalopathy, hypertensive encephalopathy, ACS, UTI    Social Determinants of Health which Significantly Impact Care: None identified     EKG Independent Interpretation: The EKG obtained at 1155 was independently interpreted by myself. It demonstrates sinus rhythm with a ventricular rate of 88.  Leftward axis.  Qtc>480ms. ST segments showed no signs of acute ischemia.  No significant change compared to prior EKG from 11/7/2024.    Independent  Result Review and Interpretation: Relevant laboratory and radiographic results were reviewed and independently interpreted by myself.  As necessary, they are commented on in the ED Course.    Chronic conditions affecting the patient's care: As documented above in [facility]    The patient was discussed with the following consultants/services: Neurology regarding BAT and Admission Coordinator who accepted the patient for admission    Care Considerations: As documented above in [facility]    IV Thrombolysis IV Thrombolysis Checklist        IV Thrombolysis Given: No; Thrombolysis contraindication reason: Working diagnosis is NOT a suspected ischemic stroke      ED Course:  ED Course as of 06/03/25 1226   Tue Jun 03, 2025   1011 Troponin I, High Sensitivity (CMC)(!): 45 [MG]   1012 Comprehensive metabolic panel(!)  Hyperglycemia of 243, stable kidney function with creatinine 1.7 [MG]   1013 Calcium(!): 11.3 [MG]   1013 WBC: 7.3 [MG]   1013 HEMOGLOBIN(!): 18.6 [MG]   1013 HEMATOCRIT(!): 55.9 [MG]   1014 Platelets: 252 [MG]   1054 MR wake up stroke wo IV contrast  While severely degraded by prominent motion artifact, I do not  believe that there are any areas of restricted diffusion to suggest  ischemia.   [MG]   1204 Troponin I, High Sensitivity (CMC)(!): 57 [MG]   1225 Urinalysis with Reflex Culture and Microscopic(!)  No UTI [MG]      ED Course User Index  [MG] Jessica Christensen MD         Diagnoses as of 06/03/25 1226   Altered mental status, unspecified altered mental status type   Hypertensive encephalopathy       Disposition   As a result of their workup, the patient will require admission to the hospital.  The patient was informed of her diagnosis.  The patient was given the opportunity to ask questions and I answered them. The patient agreed to be admitted to the hospital.    Patient seen and discussed with ED attending physician.    Jessica Christensen MD  Emergency Medicine         ATTENDING ATTESTATION  Assessment & Plan  Acute  ischemic stroke  Suspected due to dysarthria, confusion, left gaze cut, left extremity weakness, and left side neglect. CT angio showed no bleed. Possible wake-up stroke candidate. MRI planned to confirm salvageable penumbra and timing. TNK administration discussed with family.  - consider Administer TNK  - Order MRI to confirm salvageable penumbra and timing  - Admit to hospital for further management  - Monitor blood pressure parameters post-TNK administration  - Conduct serial neurologic monitoring    Hypertension  Hypertension requiring correction with repeated doses of IV labetalol. Close hemodynamic monitoring necessary.  - Administer repeated doses of IV labetalol as needed  - Monitor hemodynamics closely    Type 2 ischemic change with troponin elevation  Indicated by troponin elevation. EKG non-ischemic.   - Evaluate cardiac status and monitor troponin levels    Possible hemoconcentration with elevated hemoglobin and hematocrit.    Renal insufficiency  Combination of prerenal and intrarenal azotemia. Creatinine is relatively baseline compared with prior.  - Monitor renal function closely    Critical access hospital     Jessica Christensen MD  Resident  06/03/25 0740

## 2025-06-03 NOTE — DISCHARGE INSTRUCTIONS
Aries Yates     Thanks for coming to  for your care! As you know, you came in with confusion. While you were here there was concern for a repeat stroke, however your brain imaging was negative. The neurology doctors saw you while you were here and they believe these symptoms were due to your blood pressure being so high. It looks like you are much better now These are some important things to remember when you are discharged:   We are sending you home with these medications: refills on all your blood pressure meds, as well as your cholesterol med (atorvastatin) and aspirin (you are now on baby aspirin)   We have ordered these labs to be drawn for you: none   Consultations - you were referred to see the following specialists: primary care provider and nephrology (kidney doctors for your chronic kidney disease). You should receive a call from central scheduling in the next few days if you do not receive a call within 3-5 business days please call [phone] to schedule your appointment.   Please stay well hydrated with fluids, seems you're a bit dehydrated and your kidney numbers went down, I gave you some fluids through the IV here, but you should keep drinking fluids at home and keep following with your primary care doctor.     Best of luck,     Your  Medicine Team    Attending to bill Attending to bill Attending to bill Attending to bill Attending to bill

## 2025-06-03 NOTE — SIGNIFICANT EVENT
Rapid Response Nurse Note: Rapid Response    Pager time:   Arrival time:   Event end time:   Location: ED 13  [] Triage by phone or secure messaging    Rapid response initiated by:  [] Rapid response RN [] Family [] Nursing Supervisor [] Physician   [] RADAR auto page [] Sepsis auto-page [x] RN [] RT   [] NP/PA [] Other:     Primary reason for call:   [] BAT [] New CPAP/BiPAP [] Bleeding [x] Change in mental status   [] Chest pain [] Code blue [] FiO2 >/= 50% [] HR </= 40 bpm   [] HR >/= 130 bpm [] Hyperglycemia [] Hypoglycemia [] RADAR    [] RR </= 8 bpm [] RR >/= 30 bpm [x] SBP </= 90 mmHg [] SpO2 < 90%   [] Seizure [] Sepsis [] Shortness of breath  [] Staff concern: see comments     Initial VS upon rapid response team arrival: HR 77; /84; SPO2 97% on supplemental oxygen.    Providers present at bedside (if applicable): Dr. Butch Oneill, primary Hospitalist service; and Dr. Prasanth Reed, NACR    Interventions:  [] None [] ABG/VBG [] Assist w/ICU transfer [] BAT paged    [] Bag mask [] Blood [] Cardioversion [] Code Blue   [] Code blue for intubation [] Code status changed [] Chest x-ray [] EKG   [] IV fluid/bolus [] KUB x-ray [] Labs/cultures [] Medication   [] Nebulizer treatment [] NIPPV (CPAP/BiPAP) [] Oxygen [] Oral airway   [] Peripheral IV [] Palliative care consult [] CT/MRI [] Sepsis protocol    [] Suctioned [x] Other: see below     Outcome:  [] Coded and  [] Code blue for intubation [] Coded and transferred to ICU []  on division   [] Remained on division (no change) [] Remained on division + additional monitoring [x] Remained in ED [] Transferred to ED   [] Transferred to ICU [] Transferred to inpatient status [] Transferred for interventions (procedure) [] Transferred to ICU stepdown    [] Transferred to surgery [] Transferred to telemetry [] Sepsis protocol [] STEMI protocol   [] Stroke protocol [x] Bedside nurse instructed to page rapid response for any concerns or  acute change in condition/VS     Additional Comments: Rapid Response for altered mental status and BP.    Per report, patient abruptly became unresponsive with eyes open and not focusing while eating food.  BP 64/47.  IV fluids infusing via pressure bag.  Upon rapid response team arrival, BP up to 112/84 and patient tracking and following commands.  Patient verbally responsive within 5 minutes.  BP at 1650: 125/89.  Dr. Oneill and Dr. Reed updated family at bedside and answered questions.  Awaiting telemetry bed on LK 50.    Collaborative plan of care:  IV fluids per primary Hospitalist service   Telemetry monitoring as ordered  Staff to page rapid response for any concerns or acute change in condition/VS

## 2025-06-03 NOTE — H&P
"Subjective   History of present illness:  Saige Yates is a 74 y.o. female with type II DM, HTN, CKD 3, prior CVA, SDH 11/2024, polycythemia vera and primary HPTH s/p partial parathyroidectomy 2018  admitted for further evaluation and management of hypertensive urgency and encephalopathy.     History obtained primarily from patient's son who is at bedside due to patient's continued confusion.  Patient's son states that patient was in her usual state of health when she went to bed last evening.  There is nothing unusual about her day on 6/2.  Son reports when patient woke up around 2 AM on 6/3, she was still alert with normal speech, but he did notice some staggered walking which was new for her.  He checked on her again around 5 AM on 6/30 at which point he found her to be confused with slurred speech and worsened gait.  Per son, patient is usually alert and oriented x 3 and is generally independent in her ADLs.  Overall she is not very active but she does occasionally use a treadmill or pedal bike at home.  Per son, patient has not had most of her medications for the past 3 weeks due to challenges getting refills-she has only been taking her lisinopril.  Per son, patient has not had any recent fevers, chills, headache, chest pain, dyspnea, N/V/D, fatigue, abdominal pain, appetite changes, bowel or bladder changes.    In the ED, a BAT was initially called upon presentation.  Workup was largely negative.  Per neurology, symptoms could be secondary to stroke recrudescence, hypertensive encephalopathy, or other metabolic etiology.  Upon my evaluation of the patient in the emergency department, patient resting comfortably in bed and is currently without complaints.  She is alert and oriented only to self at this time.      Current Vitals:  BP (!) 150/113   Pulse 86   Temp 37.1 °C (98.7 °F) (Oral)   Resp 10   Ht 1.651 m (5' 5\")   Wt 51.7 kg (114 lb)   SpO2 97%   BMI 18.97 kg/m²      Labs:   CBC: WBC 7.3; 7.3 , " HGB 18.6; 18.6, ; 252  BMP: , K 4.4, Cl 101, HCO3 32, BUN 33, CR 1.70, Glu 243  LFTS: AST 24 , ALT 15, ALKPHOS 121 , TBILI 1.1   TROP: 86  COAGS: PT 11.7 , PTT 29  , INR 1.0  UA:   Results from last 7 days   Lab Units 06/03/25  1126   COLOR U  Colorless*   PH U  7.5   SPEC GRAV UR  1.020   PROTEIN U mg/dL 100 (2+)*   BLOOD UR mg/dL NEGATIVE   NITRITE U  NEGATIVE   WBC UR /HPF 1-5     ABG:    CALCIUM 11.3 MAG No results found for requested labs within last 365 days. ALB 4.7 LACTATE No results found for requested labs within last 365 days. PHOS No results found for requested labs within last 365 days. COVIDNo results found for requested labs within last 365 days.      Past Medical History:  Medical History[1]     Past Surgical History:   has a past surgical history that includes Gallbladder surgery (02/09/2015); Other surgical history (04/23/2018); Colonoscopy (05/20/2017); Other surgical history (04/27/2015); [place] lymph node biopsy (04/27/2015); Breast biopsy (02/26/2015); MR angio head wo IV contrast (11/12/2021); and Breast lumpectomy (Left).     Social history:  Alcohol:   Alcohol Use: Not At Risk (7/4/2024)    Received from [facility]    AUDIT-C     Frequency of Alcohol Consumption: Never     Average Number of Drinks: Patient does not drink     Frequency of Binge Drinking: Never     Tobacco:   Tobacco Use: Low Risk  (6/3/2025)    Patient History     Smoking Tobacco Use: Never     Smokeless Tobacco Use: Never     Passive Exposure: Past      Illicit Drugs:   Social History     Substance and Sexual Activity   Drug Use Never       Family history:  Family History[2]     Allergies:  Other and Penicillins    Home medications:  Current Outpatient Medications   Medication Instructions    amLODIPine (NORVASC) 5 mg, oral, Daily    aspirin 325 mg, Daily    atorvastatin (Lipitor) 40 mg tablet 1 tablet, Daily (0630)    atorvastatin (LIPITOR) 20 mg, oral, Daily    empagliflozin (JARDIANCE) 10 mg, oral,  "Daily    latanoprost (Xalatan) 0.005 % ophthalmic solution Administer 1 drop into both eyes once daily at bedtime.    lisinopril 40 mg, oral, Daily    metoprolol succinate XL (TOPROL-XL) 100 mg, oral, Daily, DO NOT CRUSH OR CHEW    Trulicity 0.75 mg, subcutaneous, Once Weekly        Review of systems  12 point ROS conducted and is negative except as noted above.        Objective   Vital signs:  Blood pressure (!) 150/113, pulse 86, temperature 37.1 °C (98.7 °F), temperature source Oral, resp. rate 10, height 1.651 m (5' 5\"), weight 51.7 kg (114 lb), SpO2 97%.    Physical Exam   Constitutional: Alert and oriented to self only, NAD  HEENT: NC/AT, no scleral icterus  Cardiovascular: RRR, no MRG  Respiratory: CTAB, no wheezes, rales, or rhonchi   Gastrointestinal: abdomen soft, no TTP, bowel sounds normoactive  Skin: warm, well-perfused, no rashes or lesions   Extremities: no lower extremity edema   Neuro: challenges following commands intermittently; no gross deficits   Psych: appropriate affect         Results from last 7 days   Lab Units 06/03/25  0845   WBC AUTO x10*3/uL 7.3  7.3   HEMOGLOBIN g/dL 18.6*  18.6*   HEMATOCRIT % 55.9*  55.9*   PLATELETS AUTO x10*3/uL 252  252            Results from last 7 days   Lab Units 06/03/25  0845   SODIUM mmol/L 142   POTASSIUM mmol/L 4.4   CO2 mmol/L 32   ANION GAP mmol/L 13   BUN mg/dL 33*   CREATININE mg/dL 1.70*   GLUCOSE mg/dL 243*   EGFR mL/min/1.73m*2 31*      Results from last 7 days   Lab Units 06/03/25  0845   ALT U/L 15   AST U/L 24   ALK PHOS U/L 121      Medications  Scheduled medications  Scheduled Medications[3]  Continuous medications  Continuous Medications[4]  PRN medications  PRN Medications[5]     Imaging  === 02/22/23 ===    US RENAL COMPLETE    - Impression -  Septated left renal midpole cyst measuring up to 4.5 x 3.9 x 3.5 cm.  This has increased in size since 09/8/ 17. Consider renal mass  protocol MRI for further assessment.  === 06/03/25 ===    CT " BRAIN ATTACK ANGIO HEAD AND NECK W AND WO IV CONTRAST    - Impression -  No acute intracranial abnormality. Chronic infarcts involving  bilateral thalami and cerebellar hemispheres.    No large vessel occlusion, aneurysm, or high-grade stenosis.  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    Incompletely evaluated multinodular thyroid. Consider nonemergent  thyroid ultrasound if clinically indicated.    Signed by: Charity Hastings 6/3/2025 9:13 AM  Dictation workstation:   [id number]       Assessment/Plan   Saige Yates is a 74 y.o. female with type II DM, HTN, CKD 3, prior CVA, SDH 11/2024, polycythemia vera, and primary HPTH s/p partial parathyroidectomy 2018 admitted for further evaluation and management of hypertensive urgency and encephalopathy. Upon [facility] to [facility] ED, /144, , SpO2 97% on room air.  CMP significant for hyperglycemia 243, BUN 33, creatinine 1.70, calcium 11.3.  CBC significant for hemoglobin 18.6.  UA with glucose and protein, WBC 1-5, RBC 3-5, squamous cells 1-9.  BAT was initiated in the ED.  Initial CT head with chronic infarcts and superimposed severe microangiopathic disease but no acute findings.  CTA head and neck without LVO, aneurysm, or high-grade stenosis.  MRI head without acute pathology though the quality of the study was severely degraded. Ddx includes hypertensive encephalopathy vs stroke recrudesce vs TIA vs hypercalcemia.       #encephalopathy  :: ddx as above   :: head imaging negative for acute pathology   :: UA negative; no leukocytosis; no signs of infection   - TFTs, b12, folate, HIV, RPR pending   - continue IVF for hypercalcemia    #HTN  :: SBP > 200 in ED initially   - continue home amlodipine 5mg daily   - continue home lisinopril 40mg daily   - continue home metoprolol 100mg daily     #elevated troponin  :: trop 45 -> 57 -> 86   :: ECG without evidence of  ischemia   - likely type 2 demand iso severe hypertension and CKD   - trend trop to peak     #hypercalcemia   #h/o primary HPTH s/p partial parathyroidectomy 2018  :: Ca 11.3 on admission   :: per Endo 10/2024 - discussed Prolia in the past but did not start it  - will start IVF  - PTH pending   - trend Ca  - needs Endo outpatient follow up     #h/o CVA  - continue ASA 81mg   - continue atorvastatin 40mg daily     #type 2 DM  :: HbA1c 8.7  - per last Endo note 10/2024: Trulicity 0.75mg weekly, Farxiga 10mg daily, lantus 18U bedtime    #polycythemia vera   :: hgb 18.6 on admission   - continue IVF   - trend CBC  - pt due for outpatient hematology follow up upon discharge     #CKD 3b  :: baseline Cr 1.5; admission Cr 1.7  - continue to trend RFP  - needs nephrology follow up upon discharge     #social work needs  - pt missing outpatient appts, running out of meds   - SW consult to assist with possible resources        F: Replete PRN  E: Replete PRN  N: Diabetic Diet  DVT Ppx: Heparin SubQ   Access/Lines: PIV  Abx: None   O2: None     GI Laxative: Miralax     Code status: Full Code  Medical Decision Maker: [facility] [phone]       Butch Oneill MD  Internal Medicine, PGY-3  [facility]           [1]   Past Medical History:  Diagnosis Date    Abnormal findings on diagnostic imaging of other specified body structures 12/08/2018    Abnormal chest CT    Abnormal levels of other serum enzymes 01/14/2017    Elevated liver enzymes    Body mass index (BMI) 21.0-21.9, adult 11/24/2021    BMI 21.0-21.9, adult    Body mass index (BMI) 22.0-22.9, adult 06/06/2022    Body mass index (BMI) of 22.0 to 22.9 in adult    Body mass index (BMI) 23.0-23.9, adult 07/07/2021    BMI 23.0-23.9, adult    Body mass index (BMI) 24.0-24.9, adult 09/06/2019    BMI 24.0-24.9, adult    Encounter for therapeutic drug level monitoring 03/12/2018    Encounter for monitoring anastrozole therapy    Essential (primary)  hypertension 08/24/2017    Uncontrolled hypertension    Other cerebral infarction due to occlusion or stenosis of small artery 04/08/2019    Lacunar infarction    Other conditions influencing health status 01/22/2018    Malignant neoplasm of left female breast, unspecified site of breast    Other specified disorders of breast 04/26/2016    Postoperative breast asymmetry    Personal history of diseases of the skin and subcutaneous tissue 11/23/2013    History of atopic dermatitis    Personal history of other specified conditions 11/07/2015    History of chest pain    Personal history of transient ischemic attack (TIA), and cerebral infarction without residual deficits 12/07/2019    History of transient cerebral ischemia    Unspecified lump in the left breast, unspecified quadrant 08/24/2017    Breast mass, left   [2]   Family History  Problem Relation Name Age of Onset    Other (cardiovascular disorder) Father      Stroke Father      Arthritis Sister     [3] amLODIPine, 5 mg, oral, Daily  aspirin, 325 mg, oral, Daily  atorvastatin, 40 mg, oral, Daily  heparin (porcine), 5,000 Units, subcutaneous, q8h  latanoprost, 1 drop, Both Eyes, Nightly  lisinopril, 40 mg, oral, Daily  metoprolol succinate XL, 100 mg, oral, Daily  [4]    [5] PRN medications: polyethylene glycol

## 2025-06-03 NOTE — SIGNIFICANT EVENT
Rapid Response Note    Notified by nursing staff around 4:45 PM of the patient's blood pressure being 64/47 with acute change of mental status.  Nursing staff confirmed patient was still breathing and had a pulse.  Code white was initiated.  Upon my arrival to bedside, code white team present.  Normal saline was being pressure bagged.  VBG obtained which showed 7.37/46 with a lactate of 2.3.  Patient back to her baseline by the time I arrived to bedside with blood pressures 120-140 systolic.  Per nurse report, patient was eating whenever she acutely became unresponsive and was subsequently found to be hypotensive.  Suspect this was likely related to vasovagal episode given transient nature.  Will plan to give additional 1 L NS and monitor patient on telemetry for now.  All patient and family questions and concerns were addressed.    Attending notified via [employer] Chat.     Butch Oneill MD   Internal Medicine, PGY-3  [facility]

## 2025-06-03 NOTE — CONSULTS
Subjective     HPI  Saige Yates is a 74 y.o. Right-handed female with history of T2DM, HTN, HLD, CKD3, left breast CA s/p lumpectomy and radiation (2015), HFmrEF (EF 40-45% 6/2024), polycythemia vera, hyperandrogenism, prior stroke (bilateral thalamic, bilateral cerebellar, L superior frontal), traumatic L SDH (11/2024) who presented to [facility] on 6/3/2025 with dysarthria, neurology consulted for brain attack for the above.    Last known well: 6/2 at 930 pm  Had stroke symptoms resolved at time of presentation: No  Modified mary scale (MRS): 1     Patient woke on 6/3/2025 around 630 and family noted she had slurred speech.  Last known well was night prior 6/2 at 9:30 PM.  EMS was called.  Brain attack called at 8:36 AM, stroke team arrived at 8:39 AM.  In route blood sugar 302, blood pressure 201/147.  On arrival NIH SS of 6 (left HH 2, dysarthria 1, left leg 1, left sensory neglect 1, of sensory loss 1).  Blood pressure 197/144, blood sugar 257.  Required multiple doses of labetalol.  CT head obtained and personally reviewed at time obtained with no acute intracranial abnormalities though evidence of prior infarcts of bilateral thalami and cerebellum.  CTA head and neck obtained and personally reviewed at time obtained with no evidence of large vessel occlusion though evidence of dolichoectasia of the basilar present.     Due to time from wake-up with stroke-like symptoms being less than 4-1/2 hours, MRI wake-up stroke protocol initiated.  Patient moving significantly in MRI scanner and given Ativan 1 mg.  Repeat MRI she was trying to roll off the table, with MRI degraded but no diffusion restriction appreciated.    Family present at bedside reports that patient has only been taking lisinopril.  No aspirin recently or other antihypertensives.  No diabetes medications recently.  Son states that he ran out of medications and had trouble getting the refill to the pharmacy.      They deny any recent fevers,  chills, chest pain, shortness of breath, runny nose, cough, abdominal pain, or pain when urinating.    Prior stroke history:  4/2019: Clinic visit with Dr. Cruz at which time prior MRI noted to have remote right thalamic lacunar infarct and right greater than left thalamic punctate GRE changes.  Right vert and basilar were tortuous and enlarged with diminutive left vertebral artery.  Also evidence of periventricular and pontine T2 hyperintensities consistent with microvascular disease.  At that time she was recommended Aspirin daily, reduce her blood pressure, cholesterol close provided, and blood sugar goals provided.    11/2021: Noted to have intermittent dysarthria.  At that time aspirin was continued and Plavix 75 mg added for 3 weeks.  Atorvastatin increased to 40 mg.  Imaging not available for review.  CT head showed moderate-severe periventricular white matter small vessel disease.  MRI brain with small acute cortical infarct in the left frontal lobe.    6/2024: Presented feeling dizzy with unsteady gait.  Found to have left cerebellar hemisphere infarct.  Recommended aspirin and atorvastatin at that time.      Past Medical History  Medical History[1]  Surgical History  Surgical History[2]  Social History  Social History[3]  Allergies  Other and Penicillins    Home Medications  Current Outpatient Medications   Medication Instructions    amLODIPine (NORVASC) 5 mg, oral, Daily    aspirin 325 mg, Daily    atorvastatin (Lipitor) 40 mg tablet 1 tablet, Daily (0630)    atorvastatin (LIPITOR) 20 mg, oral, Daily    empagliflozin (JARDIANCE) 10 mg, oral, Daily    latanoprost (Xalatan) 0.005 % ophthalmic solution Administer 1 drop into both eyes once daily at bedtime.    lisinopril 40 mg, oral, Daily    metoprolol succinate XL (TOPROL-XL) 100 mg, oral, Daily, DO NOT CRUSH OR CHEW    Trulicity 0.75 mg, subcutaneous, Once Weekly           Objective   24h Vitals  Heart Rate:  []   Temperature:  [37.1 °C (98.7 °F)]  "  Respirations:  [12-20]   BP: (153-203)/(112-164)   Height:  [165.1 cm (5' 5\")]   Weight:  [51.7 kg (114 lb)]   Pulse Ox:  [97 %-100 %]      Physical Exam  Neurological Exam  Physical Exam  GENERAL: Resting comfortably, no acute distress, somnolent after received Ativan  HENT: No scleral icterus or conjunctival erythema. No carotid bruit.  CARDIO: Normal S1 and S2 without murmurs. RRR.  PULM: Slight crackles bilaterally    MENTAL STATE: Orientation was normal to oriented to person, place.  Knows the birthday.  Naming and repetition intact.  Slight dysarthria    CRANIAL NERVES:   CN 2 left homonymous hemianopsia  CN 3, 4, 6 Pupils round, 3 mm in diameter, equally reactive to light.  Slight left eye XO deviation.  Lids symmetric; no ptosis. Eyes aligned in primary position. No nystagmus or other fixation instability in primary position. EOMs full range. No gaze-evoked nystagmus.  Smooth pursuit is smooth (horizontal). Normal vergence.   CN 5 Facial sensation intact bilaterally to light touch  CN 7 Normal and symmetric facial strength. Nasolabial folds symmetric.   CN 8 Hearing intact to conversation.  CN 9 Palate elevates symmetrically.   CN 11 Normal strength of shoulder shrug.  CN 12 Tongue midline, with normal bulk and strength; no fasciculations.     MOTOR: Normal tone throughout.  Decreased muscle bulk throughout.                     R L  Delt           5- 5  Bicep         5- 5-  Tricep        5 5              5 5    Hip Flex      4 4-  Leg Ext      4 4  Leg Flex     4 4  DF             5 5  PF             5 5     REFLEXES:     R L  BR:  2+ 2+     Biceps:  2+ 2+   Triceps:  2+  2+      Knee:  3+  3+  Ankle:  1+ 1+    Babinski: toes downgoing to plantar stimulation. No ankle clonus.    SENSORY: Decreased light touch in the left arm and left leg.  Hemisensory neglect to simultaneous stimulation on the left.    COORDINATION: On finger-nose-finger, movements are slow but no ataxia or dysmetria.  On heel-to-shin, " some difficulty following commands but no obvious ataxia.    GAIT: Deferred    NIHSS    1a  Level of consciousness: 0=alert; keenly responsive   1b. LOC questions:  0=Performs both tasks correctly   1c. LOC commands: 0=Performs both tasks correctly   2.  Best Gaze: 0=normal   3. Visual: 2=Complete hemianopia   4. Facial Palsy: 0=Normal symmetric movement   5a. Motor Left Arm: 0=No drift, limb holds 90 (or 45) degrees for full 10 seconds   5b.  Motor Right Arm: 0=No drift, limb holds 90 (or 45) degrees for full 10 seconds   6a. Motor Left Le=Drift, limb holds 90 (or 45) degrees but drifts down before full 10 seconds: does not hit bed   6b  Motor Right Le=No drift, limb holds 90 (or 45) degrees for full 10 seconds   7. Limb Ataxia: 0=Absent   8.  Sensory: 1=Mild to moderate sensory loss; patient feels pinprick is less sharp or is dull on the affected side; there is a loss of superficial pain with pinprick but patient is aware She is being touched   9. Best Language:  0=No aphasia, normal   10. Dysarthria: 1=Mild to moderate, patient slurs at least some words and at worst, can be understood with some difficulty   11. Extinction and Inattention: 1=Visual, tactile, auditory, spatial or personal inattention or extinction to bilateral simultaneous stimulation in one of the sensory modalities          Total:   6        Recent Labs  Results from last 72 hours   Lab Units 25  0845   SODIUM mmol/L 142   POTASSIUM mmol/L 4.4   BUN mg/dL 33*   CREATININE mg/dL 1.70*   CALCIUM mg/dL 11.3*      Results from last 72 hours   Lab Units 25  0845   WBC AUTO x10*3/uL 7.3   HEMOGLOBIN g/dL 18.6*   HEMATOCRIT % 55.9*   PLATELETS AUTO x10*3/uL 252            Lab Results   Component Value Date    HGBA1C 6.7 (H) 2024    HGBA1C 11.0 (H) 2024    HGBA1C 8.1 (H) 2024    LDLF 57 2023    LDLF 61 2022    LDLF 75 2021              BNP   Date/Time Value Ref Range Status   2021 08:29 AM 33  0 - 99 pg/mL Final     Comment:     .  <100 pg/mL - Heart failure unlikely  100-299 pg/mL - Intermediate probability of acute heart  .               failure exacerbation. Correlate with clinical  .               context and patient history.    >=300 pg/mL - Heart Failure likely. Correlate with clinical  .               context and patient history.  BNP testing is performed using different testing   methodology at [facility] than at other   [facility]. Direct result comparisons should   only be made within the same method.         Imaging    === 06/03/25 ===    CT BRAIN ATTACK ANGIO HEAD AND NECK W AND WO IV CONTRAST    - Impression -  No acute intracranial abnormality. Chronic infarcts involving  bilateral thalami and cerebellar hemispheres.    No large vessel occlusion, aneurysm, or high-grade stenosis.  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    Incompletely evaluated multinodular thyroid. Consider nonemergent  thyroid ultrasound if clinically indicated.    Signed by: Charity Hastings 6/3/2025 9:13 AM  Dictation workstation:   [id number]     === 06/03/25 ===    MR WAKE UP STROKE WO IV CONTRAST    Addendum 6/3/2025 10:47 AM -------------------------------------------------  Interpreted By:  Layton Shi,  ADDENDUM:  Layton Shi discussed the significance and urgency of this critical  finding by telephone with  GEORGE GOMEZ on 6/3/2025 at 10:1025  am.  (**-RCF-**) Findings:  See findings.      Signed by: Layton Shi 6/3/2025 10:47 AM    -------- ORIGINAL REPORT --------  Dictation workstation:   [id number]    - Impression -  1. While severely degraded by prominent motion artifact, I do not  believe that there are any areas of restricted diffusion to suggest  ischemia.    MACRO:  None    Signed by: Layton Shi 6/3/2025 10:22 AM  Dictation workstation:   [id number]          Stroke Alert  CT/MRI review: Was interpreted as it was being performed     IV Thrombolysis IV Thrombolysis Checklist      IV Thrombolysis Given: No; Thrombolysis contraindication reason: Working diagnosis is NOT a suspected ischemic stroke, no evidence of infarct on MRI      Assessment/Plan   Saige Yates is a 74 y.o. 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  Last known well was 6/2/2025 at 9:30 PM, patient woke on 6/3/2025 around 6:30 AM with dysarthria.  Initial NIH SS of (HH2, LLE 1, dysarthria 1, sensory 1, neglect 1) with presenting blood pressure 201/147.  CT head showed no acute intracranial abnormality and CTA without LVO.  MRI brain wake-up stroke protocol pursued but patient moves significantly in the scanner, requiring Ativan 1 mg continue to move.  MRI brain did not show acute infarct therefore not a candidate for TNK.  Not a candidate for mechanical thrombectomy due to no LVO.    Given no evidence of acute infarct on MRI, etiology of patient's neurologic symptoms could be secondary to stroke recrudescence, hypertensive encephalopathy, or other metabolic/infectious/toxic encephalopathy.  No further stroke workup indicated at this time and patient should continue with their secondary stroke prevention medications as previously prescribed.    Type: Unknown  Neurologic Manifestations: Left HH, dysarthria, left leg weakness  Deficits: NIHSS 6 (HH2, LLE 1, dysarthria 1, sensory 1, neglect 1)  Initial treatment: None  Anti-platelets or Anti-coagulation management: Aspirin  Vascular Risk Factors: HTN, HLD, and DM  BP goal: Normotension  Disposition: Pend PT/OT eval     Recommendations:  Okay to restart home aspirin  Okay to restart home blood pressure  medications blood pressure goal of normotension. Recommend cautious blood pressure lowering due to presenting blood pressure of 200/140.  Toxic, metabolic, and infectious encephalopathy workup per emergency department  Resume atorvastatin 40 mg daily  NPO until bedside swallow evaluation  Consider PT/OT evaluation    Vascular Risk Factor modification goals:  Blood pressure goals: avoid hypotension SBP <100 that could worsen cerebral perfusion, goal blood pressure is normotension  Lipid Goals: education on healthy diet and statin therapy to maintain or achieve goal LDL-cholesterol < 70mg.  Glucose Goals: early treatment of hyperglycemia to goal glucose 140-180 mg/dl with long-term goal A1c < 7%   Smoking Cessation and Education  Assessment for Rehabilitation needs including PT/OT and if indicated swallow evaluation and speech therapy   Patient and family education on signs and symptoms of stroke, calling 911, risk factors, and healthy strategies for stroke prevention.      Lili Workman MD  PGY-3 Neurology  Stroke [phone]    Recommendations are not final until formally staffed with the attending physician, Dr. Reddy.             [1]   Past Medical History:  Diagnosis Date    Abnormal findings on diagnostic imaging of other specified body structures 12/08/2018    Abnormal chest CT    Abnormal levels of other serum enzymes 01/14/2017    Elevated liver enzymes    Body mass index (BMI) 21.0-21.9, adult 11/24/2021    BMI 21.0-21.9, adult    Body mass index (BMI) 22.0-22.9, adult 06/06/2022    Body mass index (BMI) of 22.0 to 22.9 in adult    Body mass index (BMI) 23.0-23.9, adult 07/07/2021    BMI 23.0-23.9, adult    Body mass index (BMI) 24.0-24.9, adult 09/06/2019    BMI 24.0-24.9, adult    Encounter for therapeutic drug level monitoring 03/12/2018    Encounter for monitoring anastrozole therapy    Essential (primary) hypertension 08/24/2017    Uncontrolled hypertension    Other cerebral infarction due to occlusion or  stenosis of small artery 04/08/2019    Lacunar infarction    Other conditions influencing health status 01/22/2018    Malignant neoplasm of left female breast, unspecified site of breast    Other specified disorders of breast 04/26/2016    Postoperative breast asymmetry    Personal history of diseases of the skin and subcutaneous tissue 11/23/2013    History of atopic dermatitis    Personal history of other specified conditions 11/07/2015    History of chest pain    Personal history of transient ischemic attack (TIA), and cerebral infarction without residual deficits 12/07/2019    History of transient cerebral ischemia    Unspecified lump in the left breast, unspecified quadrant 08/24/2017    Breast mass, left   [2]   Past Surgical History:  Procedure Laterality Date    BREAST BIOPSY  02/26/2015    Biopsy Breast Percutaneous Needle Core    BREAST LUMPECTOMY Left     COLONOSCOPY  05/20/2017    Complete Colonoscopy    GALLBLADDER SURGERY  02/09/2015    Gallbladder Surgery    MR HEAD ANGIO WO IV CONTRAST  11/12/2021    MR HEAD ANGIO WO IV CONTRAST 11/12/2021 [facility] CLINICAL LEGAC[facility]    OTHER SURGICAL HISTORY  04/23/2018    Parathyroid Resection    OTHER SURGICAL HISTORY  04/27/2015    Left Breast Partial Mastectomy    SENTINEL LYMPH NODE BIOPSY  04/27/2015    [place] Lymph Node Biopsy   [3]   Social History  Tobacco Use    Smoking status: Never     Passive exposure: Past    Smokeless tobacco: Never   Substance Use Topics    Alcohol use: Yes     Comment: on holidays    Drug use: Never

## 2025-06-03 NOTE — ED PROCEDURE NOTE
Procedure  Critical Care    Performed by: Danis Godwin DO  Authorized by: Danis Godwin DO    Critical care provider statement:     Critical care time (minutes):  31    Critical care time was exclusive of:  Separately billable procedures and treating other patients and teaching time    Critical care was necessary to treat or prevent imminent or life-threatening deterioration of the following conditions:  CNS failure or compromise    Critical care was time spent personally by me on the following activities:  Blood draw for specimens, development of treatment plan with patient or surrogate, discussions with consultants, evaluation of patient's response to treatment, examination of patient, obtaining history from patient or surrogate, ordering and performing treatments and interventions, ordering and review of laboratory studies, ordering and review of radiographic studies, pulse oximetry, re-evaluation of patient's condition and review of old charts               Danis Godwin DO  06/03/25 1145

## 2025-06-04 ENCOUNTER — APPOINTMENT (OUTPATIENT)
Dept: RADIOLOGY | Facility: HOSPITAL | Age: 75
End: 2025-06-04
Payer: MEDICARE

## 2025-06-04 LAB
ALBUMIN SERPL BCP-MCNC: 3.5 G/DL (ref 3.4–5)
ANION GAP SERPL CALC-SCNC: 16 MMOL/L (ref 10–20)
BASOPHILS # BLD AUTO: 0.06 X10*3/UL (ref 0–0.1)
BASOPHILS NFR BLD AUTO: 1 %
BUN SERPL-MCNC: 30 MG/DL (ref 6–23)
CALCIUM SERPL-MCNC: 9.9 MG/DL (ref 8.6–10.6)
CHLORIDE SERPL-SCNC: 108 MMOL/L (ref 98–107)
CHOLEST SERPL-MCNC: 155 MG/DL (ref 0–199)
CHOLESTEROL/HDL RATIO: 2.1
CO2 SERPL-SCNC: 26 MMOL/L (ref 21–32)
CREAT SERPL-MCNC: 1.47 MG/DL (ref 0.5–1.05)
EGFRCR SERPLBLD CKD-EPI 2021: 37 ML/MIN/1.73M*2
EOSINOPHIL # BLD AUTO: 0.15 X10*3/UL (ref 0–0.4)
EOSINOPHIL NFR BLD AUTO: 2.5 %
ERYTHROCYTE [DISTWIDTH] IN BLOOD BY AUTOMATED COUNT: 12.7 % (ref 11.5–14.5)
GLUCOSE BLD MANUAL STRIP-MCNC: 162 MG/DL (ref 74–99)
GLUCOSE BLD MANUAL STRIP-MCNC: 202 MG/DL (ref 74–99)
GLUCOSE BLD MANUAL STRIP-MCNC: 206 MG/DL (ref 74–99)
GLUCOSE BLD MANUAL STRIP-MCNC: 229 MG/DL (ref 74–99)
GLUCOSE BLD MANUAL STRIP-MCNC: 249 MG/DL (ref 74–99)
GLUCOSE SERPL-MCNC: 166 MG/DL (ref 74–99)
HCT VFR BLD AUTO: 52.1 % (ref 36–46)
HDLC SERPL-MCNC: 75.4 MG/DL
HGB BLD-MCNC: 15.9 G/DL (ref 12–16)
IMM GRANULOCYTES # BLD AUTO: 0.01 X10*3/UL (ref 0–0.5)
IMM GRANULOCYTES NFR BLD AUTO: 0.2 % (ref 0–0.9)
LDLC SERPL CALC-MCNC: 67 MG/DL
LYMPHOCYTES # BLD AUTO: 1.65 X10*3/UL (ref 0.8–3)
LYMPHOCYTES NFR BLD AUTO: 27.3 %
MAGNESIUM SERPL-MCNC: 2.35 MG/DL (ref 1.6–2.4)
MCH RBC QN AUTO: 29.2 PG (ref 26–34)
MCHC RBC AUTO-ENTMCNC: 30.5 G/DL (ref 32–36)
MCV RBC AUTO: 96 FL (ref 80–100)
MONOCYTES # BLD AUTO: 0.57 X10*3/UL (ref 0.05–0.8)
MONOCYTES NFR BLD AUTO: 9.4 %
NEUTROPHILS # BLD AUTO: 3.6 X10*3/UL (ref 1.6–5.5)
NEUTROPHILS NFR BLD AUTO: 59.6 %
NON HDL CHOLESTEROL: 80 MG/DL (ref 0–149)
NRBC BLD-RTO: 0 /100 WBCS (ref 0–0)
PHOSPHATE SERPL-MCNC: 3.3 MG/DL (ref 2.5–4.9)
PLATELET # BLD AUTO: 217 X10*3/UL (ref 150–450)
POTASSIUM SERPL-SCNC: 4.1 MMOL/L (ref 3.5–5.3)
RBC # BLD AUTO: 5.44 X10*6/UL (ref 4–5.2)
SODIUM SERPL-SCNC: 146 MMOL/L (ref 136–145)
TRIGL SERPL-MCNC: 61 MG/DL (ref 0–149)
VLDL: 12 MG/DL (ref 0–40)
WBC # BLD AUTO: 6 X10*3/UL (ref 4.4–11.3)

## 2025-06-04 PROCEDURE — 85025 COMPLETE CBC W/AUTO DIFF WBC: CPT

## 2025-06-04 PROCEDURE — 2500000001 HC RX 250 WO HCPCS SELF ADMINISTERED DRUGS (ALT 637 FOR MEDICARE OP)

## 2025-06-04 PROCEDURE — 99233 SBSQ HOSP IP/OBS HIGH 50: CPT | Performed by: STUDENT IN AN ORGANIZED HEALTH CARE EDUCATION/TRAINING PROGRAM

## 2025-06-04 PROCEDURE — 97166 OT EVAL MOD COMPLEX 45 MIN: CPT | Mod: GO | Performed by: OCCUPATIONAL THERAPIST

## 2025-06-04 PROCEDURE — 2500000004 HC RX 250 GENERAL PHARMACY W/ HCPCS (ALT 636 FOR OP/ED)

## 2025-06-04 PROCEDURE — 80069 RENAL FUNCTION PANEL: CPT

## 2025-06-04 PROCEDURE — 83735 ASSAY OF MAGNESIUM: CPT

## 2025-06-04 PROCEDURE — 71045 X-RAY EXAM CHEST 1 VIEW: CPT

## 2025-06-04 PROCEDURE — 2500000001 HC RX 250 WO HCPCS SELF ADMINISTERED DRUGS (ALT 637 FOR MEDICARE OP): Performed by: STUDENT IN AN ORGANIZED HEALTH CARE EDUCATION/TRAINING PROGRAM

## 2025-06-04 PROCEDURE — 97162 PT EVAL MOD COMPLEX 30 MIN: CPT | Mod: GP | Performed by: PHYSICAL THERAPIST

## 2025-06-04 PROCEDURE — 36415 COLL VENOUS BLD VENIPUNCTURE: CPT

## 2025-06-04 PROCEDURE — 2500000004 HC RX 250 GENERAL PHARMACY W/ HCPCS (ALT 636 FOR OP/ED): Performed by: STUDENT IN AN ORGANIZED HEALTH CARE EDUCATION/TRAINING PROGRAM

## 2025-06-04 PROCEDURE — 2500000002 HC RX 250 W HCPCS SELF ADMINISTERED DRUGS (ALT 637 FOR MEDICARE OP, ALT 636 FOR OP/ED)

## 2025-06-04 PROCEDURE — 82947 ASSAY GLUCOSE BLOOD QUANT: CPT

## 2025-06-04 PROCEDURE — 80061 LIPID PANEL: CPT | Performed by: STUDENT IN AN ORGANIZED HEALTH CARE EDUCATION/TRAINING PROGRAM

## 2025-06-04 PROCEDURE — 1200000002 HC GENERAL ROOM WITH TELEMETRY DAILY

## 2025-06-04 RX ORDER — INSULIN LISPRO 100 [IU]/ML
0-10 INJECTION, SOLUTION INTRAVENOUS; SUBCUTANEOUS
Status: DISCONTINUED | OUTPATIENT
Start: 2025-06-04 | End: 2025-06-05 | Stop reason: HOSPADM

## 2025-06-04 RX ORDER — DEXTROSE MONOHYDRATE 50 MG/ML
75 INJECTION, SOLUTION INTRAVENOUS CONTINUOUS
Status: DISCONTINUED | OUTPATIENT
Start: 2025-06-04 | End: 2025-06-05

## 2025-06-04 RX ADMIN — LATANOPROST 1 DROP: 50 SOLUTION OPHTHALMIC at 20:55

## 2025-06-04 RX ADMIN — LISINOPRIL 40 MG: 20 TABLET ORAL at 09:04

## 2025-06-04 RX ADMIN — AMLODIPINE BESYLATE 5 MG: 5 TABLET ORAL at 09:04

## 2025-06-04 RX ADMIN — METOPROLOL SUCCINATE 100 MG: 50 TABLET, EXTENDED RELEASE ORAL at 09:04

## 2025-06-04 RX ADMIN — HEPARIN SODIUM 5000 UNITS: 5000 INJECTION, SOLUTION INTRAVENOUS; SUBCUTANEOUS at 15:13

## 2025-06-04 RX ADMIN — INSULIN LISPRO 2 UNITS: 100 INJECTION, SOLUTION INTRAVENOUS; SUBCUTANEOUS at 12:49

## 2025-06-04 RX ADMIN — HEPARIN SODIUM 5000 UNITS: 5000 INJECTION, SOLUTION INTRAVENOUS; SUBCUTANEOUS at 22:33

## 2025-06-04 RX ADMIN — DEXTROSE 75 ML/HR: 5 SOLUTION INTRAVENOUS at 18:18

## 2025-06-04 RX ADMIN — ASPIRIN 81 MG: 81 TABLET, COATED ORAL at 09:04

## 2025-06-04 RX ADMIN — Medication 10 MG: at 20:55

## 2025-06-04 RX ADMIN — ATORVASTATIN CALCIUM 40 MG: 40 TABLET, FILM COATED ORAL at 05:39

## 2025-06-04 RX ADMIN — HEPARIN SODIUM 5000 UNITS: 5000 INJECTION, SOLUTION INTRAVENOUS; SUBCUTANEOUS at 05:33

## 2025-06-04 SDOH — ECONOMIC STABILITY: FOOD INSECURITY
HOW HARD IS IT FOR YOU TO PAY FOR THE VERY BASICS LIKE FOOD, HOUSING, MEDICAL CARE, AND HEATING?: PATIENT UNABLE TO ANSWER

## 2025-06-04 SDOH — ECONOMIC STABILITY: INCOME INSECURITY
IN THE PAST 12 MONTHS HAS THE ELECTRIC, GAS, OIL, OR WATER COMPANY THREATENED TO SHUT OFF SERVICES IN YOUR HOME?: PATIENT UNABLE TO ANSWER

## 2025-06-04 SDOH — SOCIAL STABILITY: SOCIAL INSECURITY: HAS ANYONE EVER THREATENED TO HURT YOUR FAMILY OR YOUR PETS?: UNABLE TO ASSESS

## 2025-06-04 SDOH — SOCIAL STABILITY: SOCIAL INSECURITY
WITHIN THE LAST YEAR, HAVE YOU BEEN HUMILIATED OR EMOTIONALLY ABUSED IN OTHER WAYS BY YOUR PARTNER OR EX-PARTNER?: PATIENT UNABLE TO ANSWER

## 2025-06-04 SDOH — HEALTH STABILITY: MENTAL HEALTH
DO YOU FEEL STRESS - TENSE, RESTLESS, NERVOUS, OR ANXIOUS, OR UNABLE TO SLEEP AT NIGHT BECAUSE YOUR MIND IS TROUBLED ALL THE TIME - THESE DAYS?: PATIENT UNABLE TO ANSWER

## 2025-06-04 SDOH — ECONOMIC STABILITY: FOOD INSECURITY
WITHIN THE PAST 12 MONTHS, YOU WORRIED THAT YOUR FOOD WOULD RUN OUT BEFORE YOU GOT THE MONEY TO BUY MORE.: PATIENT UNABLE TO ANSWER

## 2025-06-04 SDOH — ECONOMIC STABILITY: FOOD INSECURITY
WITHIN THE PAST 12 MONTHS, THE FOOD YOU BOUGHT JUST DIDN'T LAST AND YOU DIDN'T HAVE MONEY TO GET MORE.: PATIENT UNABLE TO ANSWER

## 2025-06-04 SDOH — SOCIAL STABILITY: SOCIAL INSECURITY: WITHIN THE LAST YEAR, HAVE YOU BEEN AFRAID OF YOUR PARTNER OR EX-PARTNER?: PATIENT UNABLE TO ANSWER

## 2025-06-04 SDOH — ECONOMIC STABILITY: TRANSPORTATION INSECURITY
IN THE PAST 12 MONTHS, HAS LACK OF TRANSPORTATION KEPT YOU FROM MEDICAL APPOINTMENTS OR FROM GETTING MEDICATIONS?: PATIENT UNABLE TO ANSWER

## 2025-06-04 SDOH — ECONOMIC STABILITY: HOUSING INSECURITY
IN THE LAST 12 MONTHS, WAS THERE A TIME WHEN YOU WERE NOT ABLE TO PAY THE MORTGAGE OR RENT ON TIME?: PATIENT UNABLE TO ANSWER

## 2025-06-04 SDOH — SOCIAL STABILITY: SOCIAL INSECURITY
WITHIN THE LAST YEAR, HAVE YOU BEEN RAPED OR FORCED TO HAVE ANY KIND OF SEXUAL ACTIVITY BY YOUR PARTNER OR EX-PARTNER?: PATIENT UNABLE TO ANSWER

## 2025-06-04 SDOH — ECONOMIC STABILITY: HOUSING INSECURITY: AT ANY TIME IN THE PAST 12 MONTHS, WERE YOU HOMELESS OR LIVING IN A SHELTER (INCLUDING NOW)?: PATIENT UNABLE TO ANSWER

## 2025-06-04 SDOH — SOCIAL STABILITY: SOCIAL INSECURITY: DOES ANYONE TRY TO KEEP YOU FROM HAVING/CONTACTING OTHER FRIENDS OR DOING THINGS OUTSIDE YOUR HOME?: UNABLE TO ASSESS

## 2025-06-04 SDOH — SOCIAL STABILITY: SOCIAL INSECURITY: DO YOU FEEL ANYONE HAS EXPLOITED OR TAKEN ADVANTAGE OF YOU FINANCIALLY OR OF YOUR PERSONAL PROPERTY?: UNABLE TO ASSESS

## 2025-06-04 SDOH — SOCIAL STABILITY: SOCIAL INSECURITY: ARE THERE ANY APPARENT SIGNS OF INJURIES/BEHAVIORS THAT COULD BE RELATED TO ABUSE/NEGLECT?: UNABLE TO ASSESS

## 2025-06-04 SDOH — HEALTH STABILITY: PHYSICAL HEALTH: ON AVERAGE, HOW MANY MINUTES DO YOU ENGAGE IN EXERCISE AT THIS LEVEL?: PATIENT UNABLE TO ANSWER

## 2025-06-04 SDOH — SOCIAL STABILITY: SOCIAL INSECURITY: WERE YOU ABLE TO COMPLETE ALL THE BEHAVIORAL HEALTH SCREENINGS?: NO

## 2025-06-04 SDOH — SOCIAL STABILITY: SOCIAL INSECURITY: ARE YOU OR HAVE YOU BEEN THREATENED OR ABUSED PHYSICALLY, EMOTIONALLY, OR SEXUALLY BY ANYONE?: UNABLE TO ASSESS

## 2025-06-04 SDOH — ECONOMIC STABILITY: HOUSING INSECURITY: IN THE PAST 12 MONTHS, HOW MANY TIMES HAVE YOU MOVED WHERE YOU WERE LIVING?: 0

## 2025-06-04 SDOH — SOCIAL STABILITY: SOCIAL INSECURITY: HAVE YOU HAD ANY THOUGHTS OF HARMING ANYONE ELSE?: UNABLE TO ASSESS

## 2025-06-04 SDOH — HEALTH STABILITY: PHYSICAL HEALTH
ON AVERAGE, HOW MANY DAYS PER WEEK DO YOU ENGAGE IN MODERATE TO STRENUOUS EXERCISE (LIKE A BRISK WALK)?: PATIENT UNABLE TO ANSWER

## 2025-06-04 SDOH — SOCIAL STABILITY: SOCIAL INSECURITY
WITHIN THE LAST YEAR, HAVE YOU BEEN KICKED, HIT, SLAPPED, OR OTHERWISE PHYSICALLY HURT BY YOUR PARTNER OR EX-PARTNER?: PATIENT UNABLE TO ANSWER

## 2025-06-04 SDOH — SOCIAL STABILITY: SOCIAL INSECURITY: ABUSE: ADULT

## 2025-06-04 SDOH — SOCIAL STABILITY: SOCIAL INSECURITY: HAVE YOU HAD THOUGHTS OF HARMING ANYONE ELSE?: UNABLE TO ASSESS

## 2025-06-04 SDOH — SOCIAL STABILITY: SOCIAL INSECURITY: DO YOU FEEL UNSAFE GOING BACK TO THE PLACE WHERE YOU ARE LIVING?: UNABLE TO ASSESS

## 2025-06-04 ASSESSMENT — ACTIVITIES OF DAILY LIVING (ADL)
GROOMING: UNABLE TO ASSESS
WALKS IN HOME: UNABLE TO ASSESS
HEARING - LEFT EAR: UNABLE TO ASSESS
LACK_OF_TRANSPORTATION: PATIENT UNABLE TO ANSWER
BATHING_ASSISTANCE: TOTAL
ASSISTIVE_DEVICE: OTHER (COMMENT)
ASSISTIVE_DEVICE: OTHER (COMMENT);NONE
FEEDING YOURSELF: UNABLE TO ASSESS
DRESSING YOURSELF: UNABLE TO ASSESS
BATHING: UNABLE TO ASSESS
HEARING - RIGHT EAR: UNABLE TO ASSESS
PATIENT'S MEMORY ADEQUATE TO SAFELY COMPLETE DAILY ACTIVITIES?: NO
HEARING - RIGHT EAR: UNABLE TO ASSESS
ADEQUATE_TO_COMPLETE_ADL: NO
PATIENT'S MEMORY ADEQUATE TO SAFELY COMPLETE DAILY ACTIVITIES?: NO
DRESSING YOURSELF: UNABLE TO ASSESS
ADEQUATE_TO_COMPLETE_ADL: NO
GROOMING: UNABLE TO ASSESS
TOILETING: UNABLE TO ASSESS
LACK_OF_TRANSPORTATION: PATIENT UNABLE TO ANSWER
WALKS IN HOME: UNABLE TO ASSESS
JUDGMENT_ADEQUATE_SAFELY_COMPLETE_DAILY_ACTIVITIES: NO
HEARING - LEFT EAR: UNABLE TO ASSESS
BATHING: UNABLE TO ASSESS
FEEDING YOURSELF: UNABLE TO ASSESS
JUDGMENT_ADEQUATE_SAFELY_COMPLETE_DAILY_ACTIVITIES: NO
TOILETING: UNABLE TO ASSESS

## 2025-06-04 ASSESSMENT — LIFESTYLE VARIABLES
AUDIT-C TOTAL SCORE: -1
HOW MANY STANDARD DRINKS CONTAINING ALCOHOL DO YOU HAVE ON A TYPICAL DAY: PATIENT UNABLE TO ANSWER
SKIP TO QUESTIONS 9-10: 0
HOW OFTEN DO YOU HAVE A DRINK CONTAINING ALCOHOL: PATIENT UNABLE TO ANSWER
AUDIT-C TOTAL SCORE: -1
HOW OFTEN DO YOU HAVE 6 OR MORE DRINKS ON ONE OCCASION: PATIENT UNABLE TO ANSWER

## 2025-06-04 ASSESSMENT — PAIN - FUNCTIONAL ASSESSMENT
PAIN_FUNCTIONAL_ASSESSMENT: 0-10
PAIN_FUNCTIONAL_ASSESSMENT: 0-10

## 2025-06-04 ASSESSMENT — COGNITIVE AND FUNCTIONAL STATUS - GENERAL
DRESSING REGULAR UPPER BODY CLOTHING: TOTAL
DRESSING REGULAR LOWER BODY CLOTHING: TOTAL
MOVING FROM LYING ON BACK TO SITTING ON SIDE OF FLAT BED WITH BEDRAILS: TOTAL
DAILY ACTIVITIY SCORE: 6
STANDING UP FROM CHAIR USING ARMS: TOTAL
MOVING TO AND FROM BED TO CHAIR: TOTAL
MOBILITY SCORE: 6
WALKING IN HOSPITAL ROOM: TOTAL
HELP NEEDED FOR BATHING: TOTAL
PATIENT BASELINE BEDBOUND: YES
TOILETING: TOTAL
CLIMB 3 TO 5 STEPS WITH RAILING: TOTAL
TURNING FROM BACK TO SIDE WHILE IN FLAT BAD: TOTAL
EATING MEALS: TOTAL
PERSONAL GROOMING: TOTAL

## 2025-06-04 ASSESSMENT — PAIN SCALES - GENERAL
PAINLEVEL_OUTOF10: 0 - NO PAIN

## 2025-06-04 NOTE — CARE PLAN
Problem: Safety - Adult  Goal: Free from fall injury  Outcome: Progressing     Problem: Chronic Conditions and Co-morbidities  Goal: Patient's chronic conditions and co-morbidity symptoms are monitored and maintained or improved  Outcome: Progressing     Problem: Fall/Injury  Goal: Not fall by end of shift  Outcome: Progressing  Goal: Be free from injury by end of the shift  Outcome: Progressing  Goal: Verbalize understanding of personal risk factors for fall in the hospital  Outcome: Progressing   The patient's goals for the shift include      The clinical goals for the shift include Patient will remain safe, free from injury or fall. BP WNL

## 2025-06-04 NOTE — PROGRESS NOTES
Occupational Therapy    Evaluation    Patient Name: Saige Yates  MRN: [id number]  Department: [facility]  Room: 5071/5071-B  Today's Date: 6/4/2025  Time Calculation  Start Time: 1025  Stop Time: 1035  Time Calculation (min): 10 min        Assessment:  OT Assessment: Pt presents to OT with increased falls risk, decreased safety and independence with functional transfers and mobility and impaired ADL performance.  Pt will continue to benefit from skilled OT services to address deficits and facilitate safe return to PLOF and home environment.   Prognosis: Good  Barriers to Discharge Home: Physical needs  Physical Needs: 24hr mobility assistance needed, 24hr ADL assistance needed, High falls risk due to function or environment, Ambulating household distances limited by function/safety  Evaluation/Treatment Tolerance: Patient limited by fatigue  Medical Staff Made Aware: Yes  End of Session Communication: Bedside nurse  End of Session Patient Position: Bed, 3 rail up, Alarm on (sitter)  OT Assessment Results: Decreased ADL status, Decreased upper extremity strength, Decreased upper extremity range of motion, Decreased safe judgment during ADL, Decreased cognition, Decreased endurance, Decreased functional mobility, Decreased trunk control for functional activities  Prognosis: Good  Evaluation/Treatment Tolerance: Patient limited by fatigue  Medical Staff Made Aware: Yes  Plan:  Treatment Interventions: ADL retraining, Functional transfer training, UE strengthening/ROM, Endurance training, Cognitive reorientation, Neuromuscular reeducation, Equipment evaluation/education, Patient/family training, Compensatory technique education, Continued evaluation  OT Frequency: 3 times per week  OT Discharge Recommendations: Moderate intensity level of continued care  OT Recommended Transfer Status: Assist of 2  OT - OK to Discharge: Yes  Treatment Interventions: ADL retraining, Functional transfer training, UE strengthening/ROM,  Endurance training, Cognitive reorientation, Neuromuscular reeducation, Equipment evaluation/education, Patient/family training, Compensatory technique education, Continued evaluation    Subjective   Current Problem:  1. Altered mental status, unspecified altered mental status type        2. 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), polycythemia vera, hyperandrogenism, prior stroke (bilateral thalamic, bilateral cerebellar, L superior frontal), traumatic L SDH (11/2024)  Family/Caregiver Present: No  Co-Treatment: PT  Co-Treatment Reason: [facility]; to maximize pt safety and therapeutic potential  Prior to Session Communication: Bedside nurse  Patient Position Received: Bed, 3 rail up, Alarm on (sitter)  General Comment: pt lethargic,maintains eyes closed throughout session but brief opening x2, slurred speech, oriented to self only  Precautions:  Medical Precautions: Fall precautions     Date/Time Vitals Session Patient Position Pulse Resp SpO2 BP MAP (mmHg)    06/04/25 1133 --  --  68  --  99 %  137/92  107                 Pain:  Pain Assessment  0-10 (Numeric) Pain Score: 0 - No pain    Objective   Cognition:  Overall Cognitive Status: Impaired  Arousal/Alertness: Generalized responses  Orientation Level: Disoriented to place, Disoriented to time, Disoriented to situation  Following Commands:  (Follows <50% simple commands with cues and repetition/inc time)  Safety Judgment: Unable to assess  Problem Solving: Unable to assess  Processing Speed: Delayed           Home Living:  Type of Home:  (unable to attain from patient,  but per previous encounter in Nov 2024:  Lives With Adult children  Walker rolling or standard, Cane One level Full bath main level  Entrance Stairs: 5/6, Grab bars in shower, Built-in shower seat)  Prior Function:  Prior Function Comments: per previous encounter Nov 2024: Independent with ADLs and functional transfers, Needs assistance with homemaking Receives Help From: Family, Enjoys going for walks with her son, Son reports semi assistance with bathing; Ind with dressing and son completes cook/clean  IADL History:     ADL:  Eating Assistance: Total  Eating Deficit: NPO  Grooming Assistance: Total  Bathing Assistance: Total  UE Dressing Assistance: Total  LE Dressing Assistance: Total  Toileting Assistance with Device: Total  Activity Tolerance:  Endurance: Decreased tolerance for upright activites  Bed Mobility/Transfers: Bed Mobility  Bed Mobility: Yes  Bed Mobility 1  Bed Mobility 1: Supine to sitting  Level of Assistance 1: Dependent  Bed Mobility Comments 1: draw sheet A  Bed Mobility 2  Bed Mobility  2: Sitting to supine  Level of Assistance 2: Maximum assistance, +2    Transfers  Transfer: No      Functional Mobility:  Functional Mobility  Functional Mobility Performed: No  Sitting Balance:  Static Sitting Balance  Static Sitting-Comment/Number of Minutes: initailly requiring max A at EOB, improved to min A, LUE forearm prop ~4 min s EOB      Vision:Vision - Basic Assessment  Current Vision:  (unable to assess)  Sensation:  Light Touch: No apparent deficits  Strength:  Strength Comments: difficult to assess, poor command following, demo good  to R, however when asked to  L,  with R; overall limited command following for formal assessment       Extremities: RUE   RUE : Within Functional Limits (PROM) and LIANEE   LIANEE: Within Functional Limits (PROM)      Outcome Measures:[facility] Daily Activity  Putting on and taking off regular lower body clothing: Total  Bathing (including washing, rinsing,  drying): Total  Putting on and taking off regular upper body clothing: Total  Toileting, which includes using toilet, bedpan or urinal: Total  Taking care of personal grooming such as brushing teeth: Total  Eating Meals: Total  Daily Activity - Total Score: 6         and Brief Confusion Assessment Method (bCAM)  CAM Result: Unable to assess    Education Documentation  Body Mechanics, taught by Samia Chau OT at 6/4/2025 11:49 AM.  Learner: Patient  Readiness: Acceptance  Method: Explanation  Response: Needs Reinforcement    Precautions, taught by Samia Chau OT at 6/4/2025 11:49 AM.  Learner: Patient  Readiness: Acceptance  Method: Explanation  Response: Needs Reinforcement    ADL Training, taught by Samia Chau OT at 6/4/2025 11:49 AM.  Learner: Patient  Readiness: Acceptance  Method: Explanation  Response: Needs Reinforcement    Education Comments  No comments found.        Goals:  Encounter Problems       Encounter Problems (Active)       ADLs       Patient will complete daily grooming tasks with set-up, stand by assist level of assistance and PRN adaptive equipment while supported sitting.       Start:  06/04/25    Expected End:  06/25/25            Patient will demonstrate lower body dressing with minimal assist  level of assistancedonning and doffing all LE clothes  with PRN adaptive equipment       Start:  06/04/25    Expected End:  06/25/25               COGNITION/SAFETY       Patient will follow >75% Simple commands to allow improved ADL performance.       Start:  06/04/25    Expected End:  06/25/25            Patient will demonstrated orientation x 3       Start:  06/04/25    Expected End:  06/25/25               EXERCISE/STRENGTHENING       pt will be IND with BUE HEP for increasing functional strength and activity tolerance required for ADL completion       Start:  06/04/25    Expected End:  06/25/25               TRANSFERS       Patient will demonstrate functional transfers with least  restrictive device with minimal assist  level of assistance.       Start:  06/04/25    Expected End:  06/25/25 06/04/25 at 11:59 AM   Samia Chau OT   Rehab Office: [phone]

## 2025-06-04 NOTE — PROGRESS NOTES
"Saige Yates is a 74 y.o. female on day 1 of admission presenting with Altered mental status, unspecified altered mental status type.    Subjective   Patient without complaints this morning. She feels like she has improved. Sitter present at bedside this morning.       Objective     Last Recorded Vitals  Blood pressure 156/75, pulse 72, temperature 36.6 °C (97.9 °F), temperature source Temporal, resp. rate 16, height 1.651 m (5' 5\"), weight 51.7 kg (114 lb), SpO2 98%.     NIHSS:   NIH Stroke Scale:     Date/Time of Assessment: 6/4/2025 8:24 AM    1A. Level of Consciousness:  Alert (keenly responsive) (0)    1B. Ask Month and Age:  Both questions right (0)    1C. Blink Eyes & Squeeze Hands:  Performs both tasks (0)    2. Best Gaze:  Normal (0)    3. Visual:  No visual loss (0)    4. Facial Palsy:  Normal symmetry (0)    5A. Motor - Left Arm:  No drift (0)    5B. Motor - Right Arm:  No drift (0)    6A. Motor - Left Leg:  No drift (0)    6B. Motor - Right Leg:  No drift (0)    7. Limb Ataxia:  No ataxia (0)    8. Sensory Loss:  Normal (no sensory loss) (0)    9. Best Language:  Normal (no aphasia) (0)    10. Dysarthia:  Mild-moderate dysarthria (+1)    11. Extinction and Inattention:  No abnormality (0)    NIH Stroke Scale:  1       Physical Exam  Neurological Exam  Physical Exam  GENERAL: Lying in bed, no acute distress     MENTAL STATE: Oriented to person, place, month.  Not oriented to year.     CRANIAL NERVES:   CN 2 visual fields are full, no hemianopsia  CN 3, 4, 6 Pupils round, 3 mm in diameter, equally reactive to light.  Slight left eye XO deviation.  Lids symmetric; no ptosis. Eyes aligned in primary position. No nystagmus or other fixation instability in primary position. EOMs full range. No gaze-evoked nystagmus.  Smooth pursuit is smooth (horizontal).   CN 5 Facial sensation intact bilaterally to light touch  CN 7 Normal and symmetric facial strength. Nasolabial folds symmetric.   CN 8 Hearing intact " to conversation.  CN 9 Palate elevates symmetrically.   CN 11 Normal strength of shoulder shrug.  CN 12 Tongue midline, with normal bulk and strength; no fasciculations.      MOTOR: Normal tone throughout.  Decreased muscle bulk throughout.                              R         L  Bicep                 5          5  Tricep                5         5                     5          5     Hip Flex            5          5  Leg Ext             5          5  Leg Flex           5          5  DF                    5          5  PF                     5          5      REFLEXES:                           R          L  BR:                   2+        2+     Biceps:             2+        2+   Triceps:             2+        2+      Knee:                3+        3+  Ankle:               1+        1+     Babinski: toes downgoing to plantar stimulation. No ankle clonus.     SENSORY: Sensation to light touch symmetric.  No hemisensory neglect.     COORDINATION: On finger-nose-finger, movements are slow but no ataxia or dysmetria.  No ataxia elevation.  Some slowing with finger tapping.     GAIT: Deferred      Relevant Results            [place] Coma Scale  Best Eye Response: Spontaneous  Best Verbal Response: Confused  Best Motor Response: Follows commands  [place] Coma Scale Score: 14              Scheduled medications  Scheduled Medications[1]  Continuous medications  Continuous Medications[2]  PRN medications  PRN Medications[3]    Lab Results   Component Value Date    WBC 6.0 06/04/2025    HGB 15.9 06/04/2025    HCT 52.1 (H) 06/04/2025    MCV 96 06/04/2025     06/04/2025       Lab Results   Component Value Date    GLUCOSE 197 (H) 06/03/2025    CALCIUM 9.8 06/03/2025     06/03/2025    K 3.7 06/03/2025    CO2 25 06/03/2025     (H) 06/03/2025    BUN 30 (H) 06/03/2025    CREATININE 1.56 (H) 06/03/2025          Imaging:  === 06/03/25 ===    CT BRAIN ATTACK ANGIO HEAD AND NECK W AND WO IV CONTRAST    -  Impression -  No acute intracranial abnormality. Chronic infarcts involving  bilateral thalami and cerebellar hemispheres.    No large vessel occlusion, aneurysm, or high-grade stenosis.  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    Incompletely evaluated multinodular thyroid. Consider nonemergent  thyroid ultrasound if clinically indicated.    Signed by: Charity Hastings 6/3/2025 9:13 AM  Dictation workstation:   [id number]     MRI brain wake-up 6/3/25:  1. While severely degraded by prominent motion artifact, I do not  believe that there are any areas of restricted diffusion to suggest  ischemia.                     Assessment & Plan  Altered mental status, unspecified altered mental status type    Saige Yates is a 74 y.o. 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  Last known well was 6/2/2025 at 9:30 PM, patient woke on 6/3/2025 around 6:30 AM with dysarthria.  Initial NIH SS of (HH2, LLE 1, dysarthria 1, sensory 1, neglect 1) with presenting blood pressure 201/147.  CT head showed no acute intracranial abnormality and CTA without LVO.  MRI brain wake-up stroke protocol pursued but patient moves significantly in the scanner, requiring Ativan 1 mg continue to move.  MRI brain did not show acute infarct therefore not a candidate for TNK.  Not a candidate for mechanical thrombectomy due to no LVO.     Suspect etiology of patient's transient neurologic symptoms are secondary to stroke recrudescence from prior L thalamic stroke (give L HH, left sensory loss) that have now resolved. These symptoms occurred in the setting of hypertensive urgency and  elevated blood sugar secondary to patient running out of her aspiring, anti-hypertensives, and diabetic medications. Since back to neurologic baseline on exam on 6/4/25, would recommend re-starting her antiplatelets, antihypertensives, and diabetic regimen for secondary stroke prevention. TIA is considered unlikely given no evidence of ischemia on hyperacute MRI obtained at time that neurologic symptoms were occurring.      Type: Stroke recrudescence, suspect from prior L thalamic infarct iso of metabolic encephalopathy/hypertensive urgency  Neurologic Manifestations: Left HH, dysarthria, left leg weakness  Deficits: NIHSS 6 (HH2, LLE 1, dysarthria 1, sensory 1, neglect 1)  Initial treatment: None  Anti-platelets or Anti-coagulation management: Aspirin  Vascular Risk Factors: HTN, HLD, and DM  BP goal: Normotension  Disposition: Pend PT/OT eval      LDL 67, A1c 8.7  CTA: No LVO or significant stenosis  MRI: No infarct    Impression:  #Stroke recrudescence 2/2 hypertensive urgency and hyperglycemia    Recommendations:  Continue aspirin 81 mg daily  Continue atorvastatin 40 mg daily  Continue home blood pressure medications with outpatient goal of BP < 140/80  Continue diabetic regimen  Please obtain lipid panel  Vascular risk factor modification goals per below  PT/OT evaluation     Vascular Risk Factor modification goals:  Blood pressure goals: avoid hypotension SBP <100 that could worsen cerebral perfusion, goal blood pressure is normotension  Lipid Goals: education on healthy diet and statin therapy to maintain or achieve goal LDL-cholesterol < 70mg.  Glucose Goals: early treatment of hyperglycemia to goal glucose 140-180 mg/dl with long-term goal A1c < 7%   Smoking Cessation and Education  Assessment for Rehabilitation needs including PT/OT and if indicated swallow evaluation and speech therapy   Patient and family education on signs and symptoms of stroke, calling 911, risk factors, and healthy strategies for  stroke prevention.      Stroke neurology will sign off. Patient discussed with attending, Dr. Reddy, who is in agreement with the above.           Lili Workman MD  PGY-3 Neurology  Stroke [phone]         [1] amLODIPine, 5 mg, oral, Daily  aspirin, 81 mg, oral, Daily  atorvastatin, 40 mg, oral, Daily  heparin (porcine), 5,000 Units, subcutaneous, q8h  insulin lispro, 0-5 Units, subcutaneous, TID AC  latanoprost, 1 drop, Both Eyes, Nightly  lisinopril, 40 mg, oral, Daily  melatonin, 10 mg, oral, Nightly  metoprolol succinate XL, 100 mg, oral, Daily  [2]    [3] PRN medications: dextrose, dextrose, glucagon, glucagon, polyethylene glycol, QUEtiapine

## 2025-06-04 NOTE — PROGRESS NOTES
Physical Therapy    Physical Therapy Evaluation    Patient Name: Saige Yates  MRN: [id number]  Today's Date: 6/4/2025   Room: [address] [place] [address]  Time Calculation  Start Time: 1025  Stop Time: 1035  Time Calculation (min): 10 min    Assessment/Plan   PT Assessment  PT Assessment Results: Decreased strength, Decreased endurance, Impaired balance, Decreased mobility  Rehab Prognosis: Fair  Barriers to Discharge Home: Caregiver assistance, Cognition needs, Physical needs  Caregiver Assistance: Caregiver assistance needed per identified barriers - however, level of patient's required assistance exceeds assistance available at home  Cognition Needs: 24hr supervision for safety awareness needed, Cognition-related high falls risk  Physical Needs: Stair navigation into home limited by function/safety, 24hr mobility assistance needed, 24hr ADL assistance needed, High falls risk due to function or environment  Evaluation/Treatment Tolerance: Other (Comment) (drowsiness)  Medical Staff Made Aware: Yes  Strengths: Support of Caregivers  Barriers to Participation: Comorbidities, Ability to acquire knowledge  End of Session Communication: Bedside nurse  Assessment Comment: Patient presents to PT for evaluation s/p stroke like sxs. Patient was max A x 2 to dependent for mobility, exhibited reduced activity tolerance, and demonstrated reduced alertness and cognition. Patient is appropriate for continued skilled PT while in house to address previously mentioned impairments and work toward inc functional independence and endurance.     End of Session Patient Position: Bed, 3 rail up, Alarm on (sitter)  IP OR SWING BED PT PLAN  Inpatient or Swing Bed: Inpatient  PT Plan  Treatment/Interventions: Bed mobility, Transfer training, Gait training, Balance training, Neuromuscular re-education, Endurance training, Strengthening, Therapeutic activity, Therapeutic exercise, Home exercise program, Positioning  PT Plan: Ongoing PT  PT Frequency: 3  times per week  PT Discharge Recommendations: Moderate intensity level of continued care  PT Recommended Transfer Status: Assist x2  PT - OK to Discharge: Yes    Subjective   General Visit Information:  Reason for Referral: AMS and dysarthria - work up for possible CVA - MRI negative - dx: Stroke recrudescence, suspect from prior L thalamic infarct iso of metabolic encephalopathy/hypertensive urgency  Past Medical History Relevant to Rehab: 2DM, HTN, HLD, CKD3, left breast CA s/p lumpectomy and radiation (2015), HFmrEF (EF 40-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), polycythemia vera, hyperandrogenism, prior stroke (bilateral thalamic, bilateral cerebellar, L superior frontal), traumatic L SDH (11/2024)  Co-Treatment: OT  Co-Treatment Reason: maximize pt safety and expedite DC with AMPAC < 10  Prior to Session Communication: Bedside nurse  Patient Position Received: Bed, 3 rail up, Alarm on (sitter)  Family/Caregiver Present: No  General Comment: pt drowsy, more awake at EOB but eyes remained closed, muffled speech when answering orientation questions, A+O x 1 (self)   Home Living:  Home Living  Type of Home:  (unable to attain from patient, but per previous encounter in Nov 2024:  Lives With Adult children  Walker rolling or standard, Cane One level Full bath main level  Entrance Stairs: 5/6, Grab bars in shower, Built-in shower seat)  Prior Level of Function:  Prior Function Per Pt/Caregiver Report  Prior Function Comments: per previous encounter Nov 2024: Independent with ADLs and functional transfers, Needs assistance with homemaking Receives Help From: Family, Enjoys going for walks with her son, Son reports semi assistance with bathing; Ind with dressing and son completes cook/clean  Precautions:  Precautions  Medical Precautions: Fall precautions      Pain:  Pain Assessment  Pain Assessment: 0-10  0-10 (Numeric) Pain Score: 0 - No pain  Cognition:  Cognition  Overall Cognitive Status:  (poor commend follow)  Orientation Level:  Disoriented to place, Disoriented to time, Disoriented to situation      Extremity/Trunk Assessments:  Strength:  Strength Comments: difficult to asses 2/2 to drowsy and poor CF, when asked to move LEs did wiggle her toes and then initiated lifting LEs onto bed surface when asked to return to supine  RUE   RUE : Within Functional Limits  LUE   LUE: Within Functional Limits  RLE   RLE : Within Functional Limits  LLE   LLE : Within Functional Limits    General Assessments:  Strength  Strength Comments: difficult to asses 2/2 to drowsy and poor CF, when asked to move LEs did wiggle her toes and then initiated lifting LEs onto bed surface when asked to return to supine      Activity Tolerance  Endurance: Decreased tolerance for upright activites        Static Sitting Balance  Static Sitting-Balance Support: No upper extremity supported, Feet supported  Static Sitting-Level of Assistance:  (max A, then progressed to min A as time progressed, retropulsive)  Static Sitting-Comment/Number of Minutes: 4 mins       Functional Assessments:  Bed Mobility  Bed Mobility: Yes  Bed Mobility 1  Bed Mobility 1: Supine to sitting  Level of Assistance 1: Dependent  Bed Mobility 2  Bed Mobility  2: Sitting to supine  Level of Assistance 2: Maximum assistance, +2  Transfers  Transfer: No  Ambulation/Gait Training  Ambulation/Gait Training Performed: No          Outcome Measures:  [facility] Basic Mobility  Turning from your back to your side while in a flat bed without using bedrails: Total  Moving from lying on your back to sitting on the side of a flat bed without using bedrails: Total  Moving to and from bed to chair (including a wheelchair): Total  Standing up from a chair using your arms (e.g. wheelchair or bedside chair): Total  To walk in hospital room: Total  Climbing 3-5 steps with railing: Total  Basic Mobility - Total Score: 6                               Encounter Problems       Encounter Problems (Active)       PT Problem        patient will perform supine <> sit with mod assist for increased independence with bed mobility upon DC  (Progressing)       Start:  06/04/25    Expected End:  06/18/25            patient will perform STS transfer with mod A for increased independence with transfers upon DC  (Progressing)       Start:  06/04/25    Expected End:  06/18/25            patient will ambulate 50 feet with use of RW and mod assist  in order to demonstrate ability to manage short household distances.  (Progressing)       Start:  06/04/25    Expected End:  06/18/25            patient will sit x 8 min without UE support and SB assist while performing UE task without LOB for functional carryover  (Progressing)       Start:  06/04/25    Expected End:  06/18/25               Pain - Adult              Education Documentation  Body Mechanics, taught by Radha Burt PT at 6/4/2025 11:33 AM.  Learner: Patient  Readiness: Acceptance  Method: Explanation  Response: Needs Reinforcement  Comment: safety and sequencing with mobility    Precautions, taught by Radha Burt PT at 6/4/2025 11:33 AM.  Learner: Patient  Readiness: Acceptance  Method: Explanation  Response: Needs Reinforcement  Comment: safety and sequencing with mobility    Home Exercise Program, taught by Radha Burt PT at 6/4/2025 11:33 AM.  Learner: Patient  Readiness: Acceptance  Method: Explanation  Response: Needs Reinforcement  Comment: safety and sequencing with mobility    Mobility Training, taught by Radha Burt PT at 6/4/2025 11:33 AM.  Learner: Patient  Readiness: Acceptance  Method: Explanation  Response: Needs Reinforcement  Comment: safety and sequencing with mobility    Education Comments  No comments found.            06/04/25 at 11:34 AM   Radha Burt PT   Rehab Office: [phone]

## 2025-06-04 NOTE — PROGRESS NOTES
"   06/04/25 1712   Discharge Planning   Living Arrangements Children   Support Systems Children   Assistance Needed Total ADL care   Type of Residence Private residence   Do you have animals or pets at home? No   Who is requesting discharge planning? Provider   Home or Post Acute Services In home services   Type of Home Care Services Home health aide;Home nursing visits;Home OT;Home PT;Other (Comment)  (Home SW)   Expected Discharge Disposition Home H   Does the patient need discharge transport arranged? Yes  (Son aware medical transport can be arranged if sister (pt's daughter) is not able to provide)   RoundTrip coordination needed? Yes   Financial Resource Strain   How hard is it for you to pay for the very basics like food, housing, medical care, and heating? Pt Unable   Housing Stability   In the last 12 months, was there a time when you were not able to pay the mortgage or rent on time? Pt Unable   In the past 12 months, how many times have you moved where you were living? 0   At any time in the past 12 months, were you homeless or living in a shelter (including now)? Pt Unable   Transportation Needs   In the past 12 months, has lack of transportation kept you from medical appointments or from getting medications? Pt Unable   In the past 12 months, has lack of transportation kept you from meetings, work, or from getting things needed for daily living? Pt Unable     TCC ASSESSMENT:  Met with pt and introduced myself as care coordinator and member of the Care Transitions team for discharge planning. Assessment deferred to pt's son Noah [phone] as pt was confused with a sitter at bedside and not able to participate in assessment interview. Son Noah reported pt lives at home with him and he along with his sister Jo are her caregivers. Noah reports pt is \"pretty\" independent completing ADL's at home and is retired. Noah feels pt may need assistance a couple times a week with showering and washing her " "hair. Noah stated he and sister prepare all meals, clean, does laundry and yard work, so pt does not have to do anything physical around the house. Noah stated pt uses a walker vs cane PRN for assistance with ambulation and he sometimes assists her with exercising at home. Noah reports pt had a fall a week ago (no injury occurred) and although she does not have 24/7 supervision he feels she is safe at home because someone is there the \"majority\" of the time. Pt's address, phone number, and emergency contact information was verified. Noah denies any social or financial concerns at this time.    Home care: None.  DME: Walker, cane, grabs bars next to toilet, side rails on bed, shower bench, glucometer + supplies.  : None.  PCP: Scarlett Dennis MD.   Last appt: 8/30/2024.    Transport to appts: Son Noah provides.   Pharmacy: [facility] - mail order. Noah denies issues affording or obtaining medications but stated mail order pharmacy would not refill pt's Metroprolol or Amlodipine.     Discharge Planning: Pt presenting for further eval and management of HTN ER and encephalopathy, PT/OT consulted. Noah aware PT is recommending MODERATE intensity therapy post discharge and prefers home PT/OT vs SNF placement because pt would prefer to discharge home and she has adequate support from family. Noah is aware per therapy eval pt is a max assist x2 which is a decline from PLOF but still feels family can safely take care of her at home. Noah is requesting [facility] resources for someone to provide personal care to pt a few hours/days a week and sit with her. Noah stated pt would be over income to qualify for [place] therefore, [place] referral is not indicated at this time. Noah was given a list of home care agencies in [facility] via email to look into private pay options. Additionally, Noah was given Division of Senior and Adult Services address/phone number to connect with them for possible lower " private duty home care, meal delivery services, and other resources offered to seniors in the community. Noah is agreeable to receiving skilled home care services in the meantime and is okay with using [facility] for services. Noah was informed we can request for SN for med adherence/teaching/assessments, PT/OT, HHA, and SW for assistance with community resources, he is agreeable to above discharge plan. Attending updated of above. Care coordinator will continue to follow for discharge planning needs.    Jaiden Olivo RN  Transitional Care Coordinator (TCC)  [phone] or [phone]

## 2025-06-04 NOTE — PROGRESS NOTES
INTERNAL MEDICINE PROGRESS NOTE         SUBJECTIVE     Pt was seen and examined at beside. No acute events overnight. Pt confused this AM, on discussion with son she is usually A+Ox3, however on exam, pt is  A+Ox1-2 (place and person).    OBJECTIVE      Visit Vitals  /79   Pulse 72   Temp 37.1 °C (98.8 °F)   Resp 16        Intake/Output Summary (Last 24 hours) at 6/4/2025 1625  Last data filed at 6/4/2025 1429  Gross per 24 hour   Intake 100 ml   Output 825 ml   Net -725 ml       Physical Exam   Physical Exam   Constitutional: Alert and oriented to self only, NAD  HEENT: NC/AT, no scleral icterus  Cardiovascular: RRR, no MRG  Respiratory: CTAB, no wheezes, rales, or rhonchi   Gastrointestinal: abdomen soft, no TTP, bowel sounds normoactive  Skin: warm, well-perfused, no rashes or lesions   Extremities: no lower extremity edema   Neuro: challenges following commands intermittently; no gross deficits   Psych: appropriate affect      Current Meds   Scheduled Medications[1]   PRN Medications[2]     LABS and IMAGING     WBC   Date Value Ref Range Status   06/04/2025 6.0 4.4 - 11.3 x10*3/uL Final   06/03/2025 7.3 4.4 - 11.3 x10*3/uL Final   06/03/2025 7.3 4.4 - 11.3 x10*3/uL Final     Hemoglobin   Date Value Ref Range Status   06/04/2025 15.9 12.0 - 16.0 g/dL Final   06/03/2025 18.6 (H) 12.0 - 16.0 g/dL Final   06/03/2025 18.6 (H) 12.0 - 16.0 g/dL Final     Hematocrit   Date Value Ref Range Status   06/04/2025 52.1 (H) 36.0 - 46.0 % Final   06/03/2025 55.9 (H) 36.0 - 46.0 % Final   06/03/2025 55.9 (H) 36.0 - 46.0 % Final     Bicarbonate   Date Value Ref Range Status   06/04/2025 26 21 - 32 mmol/L Final   06/03/2025 25 21 - 32 mmol/L  Final   06/03/2025 32 21 - 32 mmol/L Final     Creatinine   Date Value Ref Range Status   06/04/2025 1.47 (H) 0.50 - 1.05 mg/dL Final   06/03/2025 1.56 (H) 0.50 - 1.05 mg/dL Final   06/03/2025 1.70 (H) 0.50 - 1.05 mg/dL Final     Calcium   Date Value Ref Range Status   06/04/2025 9.9 8.6 - 10.6 mg/dL Final   06/03/2025 9.8 8.6 - 10.6 mg/dL Final   06/03/2025 11.3 (H) 8.6 - 10.6 mg/dL Final       MR wake up stroke wo IV contrast  Addendum: Interpreted By:  Layton Shi,    ADDENDUM:   Layton Shi discussed the significance and urgency of this critical   finding by telephone with  GEORGE GOMEZ on 6/3/2025 at 10:1025  am.   (**-RCF-**) Findings:  See findings.             Signed by: Layton Shi 6/3/2025 10:47 AM        -------- ORIGINAL REPORT --------   Dictation workstation:   [id number]  Narrative: Interpreted By:  Layton Shi,   STUDY:  MR WAKE UP STROKE WO IV CONTRAST;  6/3/2025 9:41 am      INDICATION:  Signs/Symptoms:L HH, LLE drift, L hemineglect.          COMPARISON:  CT/CTA of [facility], 2020      ACCESSION NUMBER(S):  [id number]      ORDERING CLINICIAN:  GEORGE GOMEZ      TECHNIQUE:  Multiplaner, multisequence MRI were obtained without contrast      FINDINGS:  While severely degraded by prominent motion artifact, I do not  believe that there are any areas of restricted diffusion to suggest  ischemia.      Encephalomalacia defect is present within the right lateral frontal  lobe and is better visualized on the prior CT.          Impression: 1. While severely degraded by prominent motion artifact, I do not  believe that there are any areas of restricted diffusion to suggest  ischemia.      MACRO:  None      Signed by: Layton Shi 6/3/2025 10:22 AM  Dictation workstation:   [id number]  CT brain attack head wo IV contrast, CT brain attack angio head and neck W and WO IV contrast  Narrative: Interpreted By:  Charity Hastings,   STUDY:  CT BRAIN ATTACK HEAD WO IV CONTRAST; CT BRAIN ATTACK ANGIO HEAD  AND  NECK W AND WO IV CONTRAST      INDICATION:  Signs/Symptoms:stroke symptoms; Signs/Symptoms:stroke      Per ordering clinician: Left neglect, left visual field cut, left  lower extremity weakness      COMPARISON:  Head CT 11/07/2024. Brain MRI, MRA head and neck 06/12/2024.      ACCESSION NUMBER(S):  [id number]; [id number]      ORDERING CLINICIAN:  GEORGE GOMEZ      TECHNIQUE:  CT of the head was performed without intravenous contrast. CTA of the  head and neck was performed after the intravenous administration of  contrast. 3-D reconstructions were performed under physician  supervision on a separate workstation and reviewed. Contrast: 75 mL  Omnipaque 350 nonionic IV contrast.      FINDINGS:  CT HEAD:      BRAIN PARENCHYMA: No acute intraparenchymal hemorrhage, acute  territorial infarct or masses. Chronic infarcts in bilateral thalami  and bilateral cerebellar hemispheres. Superimposed severe  microangiopathic disease and moderate diffuse parenchymal volume loss.      MIDLINE SHIFT OR HERNIATION: No midline shift or herniation.      VENTRICLES: No hydrocephalus.      DURAL VENOUS SINUSES: No hyperdensity to suggest acute thrombus.      EXTRA-AXIAL SPACES (epidural, subdural, subarachnoid spaces and basal  cisterns): No collections. Intrinsic hyperdensity within the  vasculature is similar appearance to previous head CT 11/07/2024.      VISUALIZED ORBITS: Normal.      VISUALIZED PARANASAL SINUSES AND MASTOID AIR CELLS: Paranasal sinuses  are clear. Tympanomastoid cavities are aerated.      SOFT TISSUES: Normal.      CALVARIUM AND SKULL BASE: Normal.          CTA NECK:      AORTIC ARCH: The visualized portion of the aortic arch is  unremarkable in appearance. The origins of the great vessels and the  vertebral arteries are normal without evidence of stenosis.      CERVICAL CAROTID ARTERIES: The common carotid arteries are patent  bilaterally without evidence of stenosis. There is no narrowing of  the  cervical internal carotid arteries bilaterally. Retropharyngeal  course of the right ICA noted.      VERTEBRAL ARTERIES: The vertebral arteries are patent bilaterally  throughout their course.          CTA HEAD:      INTERNAL CAROTID ARTERIES: Patent distal internal carotid arteries.  Mild dolichoectasia of the right supraclinoid ICA.      CEREBRAL ARTERIES: The proximal anterior, middle and posterior  cerebral arteries are patent bilaterally. Mild dolichoectasia of the  right LIVIA.      VERTEBROBASILAR SYSTEM: Vertebrobasilar system is patent. Moderate  dolichoectasia of the basilar artery, similar to previous MRA.      VASCULAR MALFORMATION: No evidence of aneurysm or vascular  malformation.      OTHER:  Incompletely evaluated multinodular thyroid secondary to extensive  streak artifact from adjacent contrast bolus.      Impression: No acute intracranial abnormality. Chronic infarcts involving  bilateral thalami and cerebellar hemispheres.      No large vessel occlusion, aneurysm, or high-grade stenosis.  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      Incompletely evaluated multinodular thyroid. Consider nonemergent  thyroid ultrasound if clinically indicated.      Signed by: Charity Hastings 6/3/2025 9:13 AM  Dictation workstation:   [id number]       Labs and images were reviewed by me.       PROBLEM LISTS      Problem List Items Addressed This Visit       * (Principal) Altered mental status, unspecified altered mental status type - Primary     Other Visit Diagnoses         Hypertensive encephalopathy                  ASSESSMENT / PLANS    Saige Yates is a 74 y.o. female with type II DM, HTN, CKD 3, prior CVA, SDH 11/2024, polycythemia vera, and primary HPTH s/p partial parathyroidectomy 2018 admitted for further evaluation and management of hypertensive urgency and encephalopathy. Upon arrival to [facility] ED, BP  197/144, , SpO2 97% on room air.  CMP significant for hyperglycemia 243, BUN 33, creatinine 1.70, calcium 11.3.  CBC significant for hemoglobin 18.6.  UA with glucose and protein, WBC 1-5, RBC 3-5, squamous cells 1-9.  BAT was initiated in the ED.  Initial CT head with chronic infarcts and superimposed severe microangiopathic disease but no acute findings.  CTA head and neck without LVO, aneurysm, or high-grade stenosis.  MRI head without acute pathology though the quality of the study was severely degraded. Ddx includes hypertensive encephalopathy vs stroke recrudesce vs TIA vs hypercalcemia.      #Encephalopathy  :: ddx as above   :: head imaging negative for acute pathology   :: UA negative; no leukocytosis; no signs of infection   :: TFTs, b12, folate, HIV, RPR - negative   ::neurology consulted - they believe pt's presentation is consistent with stroke recrudescence 2/2 hypertensive urgency and hyperglycemia   -neuro recommended: c/w ASA, atorvastatin, diabetic regimen, repeat lipid panel, have signed off      #HTN  :: SBP > 200 in ED initially, now in 130s systolic   - continue home amlodipine 5mg daily   - continue home lisinopril 40mg daily   - continue home metoprolol 100mg daily      #elevated troponin  :: trop 45 -> 57 -> 86 -->158 --> 131   :: ECG without evidence of ischemia   - likely type 2 demand iso severe hypertension and CKD   - no need to trend any further     #Hypernatremia   ::Na 146   -will order D5W at 75 ml/hr for 1 day   -trend RFP     #SOB  ::on 2L NC; no home O2 requirement   -will order CXR to further evaluate  -c/w to wean as tolerated     #hypercalcemia   #h/o primary HPTH s/p partial parathyroidectomy 2018  :: Ca 11.3 on admission   :: per Endo 10/2024 - discussed Prolia in the past but did not start it  - will start IVF  - PTH pending   - trend Ca  - needs Endo outpatient follow up      #h/o CVA  - continue ASA 81mg   - continue atorvastatin 40mg daily      #type 2 DM  :: HbA1c  8.7  - per last Endo note 10/2024: Trulicity 0.75mg weekly, Farxiga 10mg daily, lantus 18U bedtime  -increase SSI to #2     #polycythemia vera   :: hgb 18.6 on admission   - continue IVF   - trend CBC  - pt due for outpatient hematology follow up upon discharge      #CKD 3b  :: baseline Cr 1.5; admission Cr 1.7  - continue to trend RFP  - needs nephrology follow up upon discharge      #social work needs  - pt missing outpatient appts, running out of meds   -  consult to assist with possible resources      #Dispo: PT/OT recommended SNF, son refused and prefers C         F: Replete PRN  E: Replete PRN  N: Diabetic Diet  DVT Ppx: Heparin SubQ   Access/Lines: PIV  Abx: None   O2: None      GI Laxative: Miralax      Code status: Full Code  Medical Decision Maker: Noah [phone]       The total time spent on the floor rendering services for this patient  and coordinating the patient's care (obtaining/reviewing additional history, performing medically appropriate exam and/or evaluation, reviewing the electronic chart, labs, imaging, independently interpreting the results and communicating and discussing with medical teams, counseling and educating the patient, discussing with family members, ordering medications, tests and/or procedures, coordinating care, and documentation) was (amount of time) minutes.        Shawna Kessler MD                 [1] amLODIPine, 5 mg, oral, Daily  aspirin, 81 mg, oral, Daily  atorvastatin, 40 mg, oral, Daily  heparin (porcine), 5,000 Units, subcutaneous, q8h  insulin lispro, 0-5 Units, subcutaneous, TID AC  latanoprost, 1 drop, Both Eyes, Nightly  lisinopril, 40 mg, oral, Daily  melatonin, 10 mg, oral, Nightly  metoprolol succinate XL, 100 mg, oral, Daily  [2] PRN medications: dextrose, dextrose, glucagon, glucagon, polyethylene glycol, QUEtiapine

## 2025-06-04 NOTE — PROGRESS NOTES
06/04/25 1348   Discharge Planning   Who is requesting discharge planning? Other (Comment)  (Initial TCC assessment (attempt))   Home or Post Acute Services In home services   Type of Home Care Services Home nursing visits;Home OT;Home PT;Home health aide   Expected Discharge Disposition Home H     Attempted to meet with pt to complete discharge planning assessment. Pt appeared confused with a 1:1 sitter present at bedside. Assessment deferred to 24/7 caregiver/son Noah [phone] (per prior admission notes) but he did not answer, left voice mail message for return call. Care coordinator will continue to follow for discharge planning needs.    Jaiden Olivo RN  Transitional Care Coordinator (TCC)  [phone] or [phone]

## 2025-06-05 ENCOUNTER — PHARMACY VISIT (OUTPATIENT)
Dept: PHARMACY | Facility: CLINIC | Age: 75
End: 2025-06-05
Payer: COMMERCIAL

## 2025-06-05 ENCOUNTER — DOCUMENTATION (OUTPATIENT)
Dept: HOME HEALTH SERVICES | Facility: HOME HEALTH | Age: 75
End: 2025-06-05
Payer: MEDICARE

## 2025-06-05 ENCOUNTER — HOME HEALTH ADMISSION (OUTPATIENT)
Dept: HOME HEALTH SERVICES | Facility: HOME HEALTH | Age: 75
End: 2025-06-05
Payer: MEDICARE

## 2025-06-05 VITALS
BODY MASS INDEX: 18.99 KG/M2 | TEMPERATURE: 98.2 F | OXYGEN SATURATION: 98 % | SYSTOLIC BLOOD PRESSURE: 129 MMHG | HEART RATE: 75 BPM | WEIGHT: 114 LBS | HEIGHT: 65 IN | RESPIRATION RATE: 16 BRPM | DIASTOLIC BLOOD PRESSURE: 82 MMHG

## 2025-06-05 LAB
ALBUMIN SERPL BCP-MCNC: 3.5 G/DL (ref 3.4–5)
ANION GAP SERPL CALC-SCNC: 13 MMOL/L (ref 10–20)
BASOPHILS # BLD AUTO: 0.04 X10*3/UL (ref 0–0.1)
BASOPHILS NFR BLD AUTO: 0.7 %
BUN SERPL-MCNC: 34 MG/DL (ref 6–23)
CALCIUM SERPL-MCNC: 10 MG/DL (ref 8.6–10.6)
CHLORIDE SERPL-SCNC: 102 MMOL/L (ref 98–107)
CO2 SERPL-SCNC: 27 MMOL/L (ref 21–32)
CREAT SERPL-MCNC: 1.82 MG/DL (ref 0.5–1.05)
EGFRCR SERPLBLD CKD-EPI 2021: 29 ML/MIN/1.73M*2
EOSINOPHIL # BLD AUTO: 0.27 X10*3/UL (ref 0–0.4)
EOSINOPHIL NFR BLD AUTO: 4.7 %
ERYTHROCYTE [DISTWIDTH] IN BLOOD BY AUTOMATED COUNT: 12.3 % (ref 11.5–14.5)
GLUCOSE BLD MANUAL STRIP-MCNC: 219 MG/DL (ref 74–99)
GLUCOSE BLD MANUAL STRIP-MCNC: 260 MG/DL (ref 74–99)
GLUCOSE SERPL-MCNC: 249 MG/DL (ref 74–99)
HCT VFR BLD AUTO: 46.4 % (ref 36–46)
HGB BLD-MCNC: 14.9 G/DL (ref 12–16)
IMM GRANULOCYTES # BLD AUTO: 0.04 X10*3/UL (ref 0–0.5)
IMM GRANULOCYTES NFR BLD AUTO: 0.7 % (ref 0–0.9)
LYMPHOCYTES # BLD AUTO: 1.65 X10*3/UL (ref 0.8–3)
LYMPHOCYTES NFR BLD AUTO: 28.7 %
MAGNESIUM SERPL-MCNC: 1.93 MG/DL (ref 1.6–2.4)
MCH RBC QN AUTO: 29.3 PG (ref 26–34)
MCHC RBC AUTO-ENTMCNC: 32.1 G/DL (ref 32–36)
MCV RBC AUTO: 91 FL (ref 80–100)
MONOCYTES # BLD AUTO: 0.51 X10*3/UL (ref 0.05–0.8)
MONOCYTES NFR BLD AUTO: 8.9 %
NEUTROPHILS # BLD AUTO: 3.23 X10*3/UL (ref 1.6–5.5)
NEUTROPHILS NFR BLD AUTO: 56.3 %
NRBC BLD-RTO: 0 /100 WBCS (ref 0–0)
PHOSPHATE SERPL-MCNC: 2.8 MG/DL (ref 2.5–4.9)
PLATELET # BLD AUTO: 213 X10*3/UL (ref 150–450)
POTASSIUM SERPL-SCNC: 3.5 MMOL/L (ref 3.5–5.3)
RBC # BLD AUTO: 5.08 X10*6/UL (ref 4–5.2)
SODIUM SERPL-SCNC: 138 MMOL/L (ref 136–145)
WBC # BLD AUTO: 5.7 X10*3/UL (ref 4.4–11.3)

## 2025-06-05 PROCEDURE — 97110 THERAPEUTIC EXERCISES: CPT | Mod: GP | Performed by: PHYSICAL THERAPIST

## 2025-06-05 PROCEDURE — 2500000001 HC RX 250 WO HCPCS SELF ADMINISTERED DRUGS (ALT 637 FOR MEDICARE OP)

## 2025-06-05 PROCEDURE — 82947 ASSAY GLUCOSE BLOOD QUANT: CPT

## 2025-06-05 PROCEDURE — 97116 GAIT TRAINING THERAPY: CPT | Mod: GP | Performed by: PHYSICAL THERAPIST

## 2025-06-05 PROCEDURE — 2500000004 HC RX 250 GENERAL PHARMACY W/ HCPCS (ALT 636 FOR OP/ED): Performed by: STUDENT IN AN ORGANIZED HEALTH CARE EDUCATION/TRAINING PROGRAM

## 2025-06-05 PROCEDURE — 2500000004 HC RX 250 GENERAL PHARMACY W/ HCPCS (ALT 636 FOR OP/ED)

## 2025-06-05 PROCEDURE — RXMED WILLOW AMBULATORY MEDICATION CHARGE

## 2025-06-05 PROCEDURE — 80069 RENAL FUNCTION PANEL: CPT

## 2025-06-05 PROCEDURE — 36415 COLL VENOUS BLD VENIPUNCTURE: CPT

## 2025-06-05 PROCEDURE — 2500000002 HC RX 250 W HCPCS SELF ADMINISTERED DRUGS (ALT 637 FOR MEDICARE OP, ALT 636 FOR OP/ED): Performed by: STUDENT IN AN ORGANIZED HEALTH CARE EDUCATION/TRAINING PROGRAM

## 2025-06-05 PROCEDURE — 83735 ASSAY OF MAGNESIUM: CPT

## 2025-06-05 PROCEDURE — 85025 COMPLETE CBC W/AUTO DIFF WBC: CPT

## 2025-06-05 RX ORDER — ASPIRIN 81 MG/1
81 TABLET ORAL DAILY
Qty: 90 TABLET | Refills: 0 | Status: SHIPPED | OUTPATIENT
Start: 2025-06-05

## 2025-06-05 RX ORDER — METOPROLOL SUCCINATE 100 MG/1
100 TABLET, EXTENDED RELEASE ORAL DAILY
Qty: 90 TABLET | Refills: 0 | Status: SHIPPED | OUTPATIENT
Start: 2025-06-05

## 2025-06-05 RX ORDER — LISINOPRIL 40 MG/1
40 TABLET ORAL DAILY
Qty: 90 TABLET | Refills: 0 | Status: SHIPPED | OUTPATIENT
Start: 2025-06-05

## 2025-06-05 RX ORDER — ATORVASTATIN CALCIUM 40 MG/1
40 TABLET, FILM COATED ORAL
Qty: 90 TABLET | Refills: 0 | Status: SHIPPED | OUTPATIENT
Start: 2025-06-05

## 2025-06-05 RX ORDER — AMLODIPINE BESYLATE 5 MG/1
5 TABLET ORAL DAILY
Qty: 30 TABLET | Refills: 0 | Status: SHIPPED | OUTPATIENT
Start: 2025-06-05

## 2025-06-05 RX ADMIN — HEPARIN SODIUM 5000 UNITS: 5000 INJECTION, SOLUTION INTRAVENOUS; SUBCUTANEOUS at 05:16

## 2025-06-05 RX ADMIN — INSULIN LISPRO 4 UNITS: 100 INJECTION, SOLUTION INTRAVENOUS; SUBCUTANEOUS at 13:02

## 2025-06-05 RX ADMIN — SODIUM CHLORIDE, SODIUM LACTATE, POTASSIUM CHLORIDE, AND CALCIUM CHLORIDE 500 ML: .6; .31; .03; .02 INJECTION, SOLUTION INTRAVENOUS at 13:02

## 2025-06-05 RX ADMIN — METOPROLOL SUCCINATE 100 MG: 50 TABLET, EXTENDED RELEASE ORAL at 08:43

## 2025-06-05 RX ADMIN — ATORVASTATIN CALCIUM 40 MG: 40 TABLET, FILM COATED ORAL at 05:16

## 2025-06-05 RX ADMIN — LISINOPRIL 40 MG: 20 TABLET ORAL at 08:42

## 2025-06-05 RX ADMIN — HEPARIN SODIUM 5000 UNITS: 5000 INJECTION, SOLUTION INTRAVENOUS; SUBCUTANEOUS at 13:51

## 2025-06-05 RX ADMIN — AMLODIPINE BESYLATE 5 MG: 5 TABLET ORAL at 08:43

## 2025-06-05 RX ADMIN — DEXTROSE 75 ML/HR: 5 SOLUTION INTRAVENOUS at 07:43

## 2025-06-05 RX ADMIN — INSULIN LISPRO 6 UNITS: 100 INJECTION, SOLUTION INTRAVENOUS; SUBCUTANEOUS at 08:54

## 2025-06-05 RX ADMIN — ASPIRIN 81 MG: 81 TABLET, COATED ORAL at 08:42

## 2025-06-05 ASSESSMENT — PAIN - FUNCTIONAL ASSESSMENT
PAIN_FUNCTIONAL_ASSESSMENT: 0-10

## 2025-06-05 ASSESSMENT — COGNITIVE AND FUNCTIONAL STATUS - GENERAL
MOVING FROM LYING ON BACK TO SITTING ON SIDE OF FLAT BED WITH BEDRAILS: A LITTLE
TURNING FROM BACK TO SIDE WHILE IN FLAT BAD: A LITTLE
MOVING TO AND FROM BED TO CHAIR: A LITTLE
CLIMB 3 TO 5 STEPS WITH RAILING: TOTAL
MOBILITY SCORE: 17
TURNING FROM BACK TO SIDE WHILE IN FLAT BAD: A LITTLE
STANDING UP FROM CHAIR USING ARMS: A LITTLE
CLIMB 3 TO 5 STEPS WITH RAILING: A LOT
WALKING IN HOSPITAL ROOM: A LITTLE
MOVING TO AND FROM BED TO CHAIR: A LITTLE
STANDING UP FROM CHAIR USING ARMS: A LITTLE
MOVING FROM LYING ON BACK TO SITTING ON SIDE OF FLAT BED WITH BEDRAILS: A LITTLE
WALKING IN HOSPITAL ROOM: A LITTLE
MOBILITY SCORE: 16

## 2025-06-05 ASSESSMENT — PAIN SCALES - GENERAL
PAINLEVEL_OUTOF10: 0 - NO PAIN

## 2025-06-05 NOTE — PROGRESS NOTES
06/05/25 1353   Discharge Planning   Who is requesting discharge planning? Provider   Home or Post Acute Services In home services   Type of Home Care Services Home nursing visits;Home OT;Home PT;Other (Comment);Home health aide  (Home SW)   Expected Discharge Disposition Home H  ([facility]   Has discharge transport been arranged? Yes  ([facility]/stretcher # [phone])   What day is the transport expected? 06/05/25   What time is the transport expected? 1530     Transitional Care Coordination Progress Note:  Patient discussed during interdisciplinary rounds.  Team members present: MD, MARIJA  Plan per Medical/Surgical team: Per attending pt weaned to RA and is medically ready for discharge home today, [id number] ordered. Caregiver/son Noah is still refusing SNF placement at this time.  Payer: [employer]  Status: Inpatient  Discharge disposition: [facility] for  assistance with teaching/med adherence/assessments, PT/OT, HHA and SW services + [facility] virtual monitoring program. [facility] intake confirmed receipt of home care referral and SOC 6/8 via secure chat.  Potential Barriers: None  ADOD: 6/5  Stretcher transport confirmed for 3:30PM via [facility] in [place] per son's request. Son Noah [phone] confirmed address for transport and aware of pickup time, he stated he will be at home to receive pt. Son voiced no additional questions or concerns regarding discharge planning. Attending and nursing updated on transport time. Care coordinator will continue to follow for discharge planning needs.     Jaiden Olivo RN  Transitional Care Coordinator (TCC)  [phone] or [phone]

## 2025-06-05 NOTE — CONSULTS
"Nutrition Initial Assessment:   Nutrition Assessment    Reason for Assessment: Admission nursing screening - MST 3    Patient is a 74 y.o. female admitted for further evaluation and management of hypertensive urgency and encephalopathy. Neuro provided recs, signed off.     PMHx of type II DM, HTN, CKD 3, prior CVA, SDH 11/2024, polycythemia vera, and primary HPTH s/p partial parathyroidectomy 2018     Nutrition History:  Food and Nutrient History: Unable to visit pt, working with team x 2 attempts. Pt with an MST score of d/t unknown weight loss and unknown appetite/intake. This may be iso AMS when admitted. Pt with no intake documentation to assess. Per [facility], pt with 2-3 meal orders/day, a couple meals lacking protein. Will order Glucerna  Vitamin/Herbal Supplement Use: None per chart  Food Allergy:  (None per chart)       Anthropometrics:  Height: 165.1 cm (5' 5\")   Weight: 51.7 kg (114 lb)   BMI (Calculated): 18.97  IBW/kg (Dietitian Calculated): 56.8 kg  Percent of IBW: 91 %    Weight History:   Wt Readings from Last 15 Encounters:   06/03/25 51.7 kg (114 lb)   11/07/24 52.1 kg (114 lb 13.8 oz)   10/16/24 51 kg (112 lb 6.4 oz)   10/10/24 52.2 kg (115 lb)   08/30/24 50.3 kg (111 lb)   08/12/24 50.8 kg (112 lb)   08/07/24 49.9 kg (110 lb)   07/31/24 50.3 kg (111 lb)   06/12/24 49.9 kg (110 lb)   06/12/24 50.3 kg (110 lb 14.3 oz)   05/15/24 50.3 kg (111 lb)   05/01/24 51.9 kg (114 lb 6.7 oz)   03/20/24 51.7 kg (114 lb)   03/15/24 50.8 kg (112 lb)   01/03/24 51.8 kg (114 lb 1.6 oz)       Weight Change %:  Weight History / % Weight Change: Per chart, pt with stable weight throughout the past year, though BMI is underweight for age    Nutrition Focused Physical Exam Findings:  Defer: unable to visit pt  Edema:  Edema: none  Physical Findings:  Skin: Negative  Digestive System Findings:  (None per chart)  Mouth Findings:  (Easy to chew diet, likely d/t AMS)    Nutrition Significant Labs:  CBC Trend:   Results " from last 7 days   Lab Units 06/05/25  0647 06/04/25  0638 06/03/25  0845   WBC AUTO x10*3/uL 5.7 6.0 7.3  7.3   RBC AUTO x10*6/uL 5.08 5.44* 6.22*  6.22*   HEMOGLOBIN g/dL 14.9 15.9 18.6*  18.6*   HEMATOCRIT % 46.4* 52.1* 55.9*  55.9*   MCV fL 91 96 90  90   PLATELETS AUTO x10*3/uL 213 217 252  252    , BMP Trend:   Results from last 7 days   Lab Units 06/05/25  0647 06/04/25  0638 06/03/25  1715 06/03/25  0845   GLUCOSE mg/dL 249* 166* 197* 243*   CALCIUM mg/dL 10.0 9.9 9.8 11.3*   SODIUM mmol/L 138 146* 144 142   POTASSIUM mmol/L 3.5 4.1 3.7 4.4   CO2 mmol/L 27 26 25 32   CHLORIDE mmol/L 102 108* 108* 101   BUN mg/dL 34* 30* 30* 33*   CREATININE mg/dL 1.82* 1.47* 1.56* 1.70*    , A1C:  Lab Results   Component Value Date    HGBA1C 8.7 (H) 06/03/2025   , BG POCT trend:   Results from last 7 days   Lab Units 06/05/25  1100 06/05/25  0850 06/04/25 2011 06/04/25  1833 06/04/25  1527   POCT GLUCOSE mg/dL 219* 260* 229* 206* 202*    , Liver Function Trend:   Results from last 7 days   Lab Units 06/03/25  0845   ALK PHOS U/L 121   AST U/L 24   ALT U/L 15   BILIRUBIN TOTAL mg/dL 1.1    , Renal Lab Trend:   Results from last 7 days   Lab Units 06/05/25  0647 06/04/25  0638 06/03/25  1715 06/03/25  1715 06/03/25  0845   POTASSIUM mmol/L 3.5 4.1  --  3.7 4.4   PHOSPHORUS mg/dL 2.8 3.3  --  3.1 2.9   SODIUM mmol/L 138 146*  --  144 142   MAGNESIUM mg/dL 1.93 2.35   < >  --   --    EGFR mL/min/1.73m*2 29* 37*  --  34* 31*   BUN mg/dL 34* 30*  --  30* 33*   CREATININE mg/dL 1.82* 1.47*  --  1.56* 1.70*    < > = values in this interval not displayed.    , Vit D:   Lab Results   Component Value Date    VITD25 49 06/03/2025        Nutrition Specific Medications:  Scheduled medications  amLODIPine, 5 mg, oral, Daily  aspirin, 81 mg, oral, Daily  atorvastatin, 40 mg, oral, Daily  heparin (porcine), 5,000 Units, subcutaneous, q8h  insulin lispro, 0-10 Units, subcutaneous, TID AC  lactated Ringer's, 500 mL, intravenous,  Once  latanoprost, 1 drop, Both Eyes, Nightly  lisinopril, 40 mg, oral, Daily  melatonin, 10 mg, oral, Nightly  metoprolol succinate XL, 100 mg, oral, Daily    Continuous medications  Continuous Medications[1]  PRN medications  PRN medications: dextrose, dextrose, glucagon, glucagon, polyethylene glycol, QUEtiapine      I/O:    ;      Dietary Orders (From admission, onward)       Start     Ordered    06/04/25 1500  May Participate in Room Service  ( ROOM SERVICE MAY PARTICIPATE)  Once        Question:  .  Answer:  Yes    06/04/25 1459    06/03/25 1415  Adult diet Consistent Carb; CCD 75 gm/meal; Easy to chew  Diet effective now        Question Answer Comment   Diet type Consistent Carb    Carb diet selection: CCD 75 gm/meal    Texture Easy to chew        06/03/25 1414                     Estimated Needs:   Total Energy Estimated Needs in 24 hours (kCal): 1550 kCal  Method for Estimating Needs: 30 kcal/kg CBW  Total Protein Estimated Needs in 24 Hours (g): 60 g  Method for Estimating 24 Hour Protein Needs: 1.2 g/kg CBW  Total Fluid Estimated Needs in 24 Hours (mL):  (1 ml/kcal or per med team)           Nutrition Diagnosis   Malnutrition Diagnosis  Patient has Malnutrition Diagnosis:  (Unable to determine at this time with lack of data)    Nutrition Diagnosis  Patient has Nutrition Diagnosis: Yes  Diagnosis Status (1): New  Nutrition Diagnosis 1: Underweight  Related to (1): Unknown etiology  As Evidenced by (1): Chonic underweight BMI for age (currently 18.97)       Nutrition Interventions/Recommendations   Nutrition prescription for oral nutrition    Nutrition Recommendations:    Continue diet as tolerated.   Ordered Glucerna shake (220 kcal, 10 g pro) BID    Nutrition Interventions/Goals:   Ordered Glucerna shake (220 kcal, 10 g pro) BID      Nutrition Monitoring and Evaluation   Food/Nutrient Related History Monitoring  Monitoring and Evaluation Plan: Estimated Energy Intake  Estimated Energy Intake: Energy  intake greater or equal to 75% of estimated energy needs    Anthropometric Measurements  Monitoring and Evaluation Plan: Body weight  Body Weight: Body weight - Maintain stable weight    Biochemical Data, Medical Tests and Procedures  Monitoring and Evaluation Plan: Electrolyte/renal panel, Glucose/endocrine profile  Criteria: WNL         Goal Status: New goal(s) identified    Time Spent (min): 45 minutes            [1]

## 2025-06-05 NOTE — NURSING NOTE
Discharge  AVS reviewed with pt who denies any questions/concerns at this time. Pt admitted for AMS/hypertension, discharging home with [facility]. Wheelchair transport set to take pt home via wheelchair at 3:30pm. Pt awaiting meds to beds delivery. IV to be removed by bedside RN when fluid bolus is finished.

## 2025-06-05 NOTE — PROGRESS NOTES
Physical Therapy    Physical Therapy Treatment    Patient Name: Saige Yates  MRN: [id number]  Department: [place] [id number]  Room: [address] [place] [address]  Today's Date: 6/5/2025  Time Calculation  Start Time: 1235  Stop Time: 1300  Time Calculation (min): 25 min         Assessment/Plan   PT Assessment  PT Assessment Results: Decreased strength, Decreased endurance, Impaired balance, Decreased mobility  Rehab Prognosis: Good  Barriers to Discharge Home: Caregiver assistance, Cognition needs, Physical needs  Caregiver Assistance: Caregiver assistance needed per identified barriers - however, level of patient's required assistance exceeds assistance available at home  Cognition Needs: 24hr supervision for safety awareness needed, Cognition-related high falls risk  Physical Needs: Stair navigation into home limited by function/safety, 24hr mobility assistance needed, 24hr ADL assistance needed, High falls risk due to function or environment  Medical Staff Made Aware: Yes  Strengths: Support of Caregivers  Barriers to Participation: Comorbidities  End of Session Communication: Bedside nurse  Assessment Comment: pt still with decrease balance and at risk for falls. pt would benefit from skilled services to improve funcitonal mobility and prevent falls.  End of Session Patient Position:  (sittting EOB with sitter and RN in room)  PT Plan  Inpatient/Swing Bed or Outpatient: Inpatient  PT Plan  Treatment/Interventions: Bed mobility, Transfer training, Gait training, Stair training, Balance training, Strengthening, Endurance training, Range of motion, Therapeutic exercise, Therapeutic activity, Home exercise program  PT Plan: Ongoing PT  PT Frequency: 3 times per week  PT Discharge Recommendations: Moderate intensity level of continued care (if pt's family decline pt will need 24 hour assist and low intensity)  PT Recommended Transfer Status: Assist x1  PT - OK to Discharge: Yes    PT Visit Info:  PT Received On: 06/05/25     General Visit  Information:   General  Reason for Referral: AMS and dysarthria - work up for possible CVA - MRI negative - dx: Stroke recrudescence, suspect from prior L thalamic infarct iso of metabolic encephalopathy/hypertensive urgency  Past Medical History Relevant to Rehab: 2DM, HTN, HLD, CKD3, left breast CA s/p lumpectomy and radiation (2015), 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    Subjective   Precautions:  Precautions  Medical Precautions: Fall precautions            Objective   Pain:  Pain Assessment  Pain Assessment: 0-10  0-10 (Numeric) Pain Score: 0 - No pain  Cognition:  Cognition  Orientation Level: Disoriented to time, Disoriented to situation  Insight: Moderate  Impulsive: Mildly  Processing Speed: Delayed  Coordination:  Alternating Toe Taps: Dyskinesia  Heel to Shin: Intact  Finger to Target: Bradykinesia  Coordination Comment: Opposition intact  Postural Control:  Postural Control  Postural Control: Within Functional Limits  Static Sitting Balance  Static Sitting-Balance Support: Feet supported  Static Sitting-Level of Assistance: Contact guard  Static Sitting-Comment/Number of Minutes: pt sat EOB for 8 mins  Static Standing Balance  Static Standing-Balance Support: Bilateral upper extremity supported (ww)  Static Standing-Level of Assistance: Minimum assistance  Dynamic Standing Balance  Dynamic Standing-Balance Support: Bilateral upper extremity supported (ww)  Dynamic Standing-Level of Assistance: Minimum assistance    Activity Tolerance:  Activity Tolerance  Endurance: Decreased tolerance for upright activites  Treatments:  Therapeutic Exercise  Therapeutic Exercise  Performed: Yes  Therapeutic Exercise Activity 1: supine: SLR, HS, hip abd/add 1 x 10eachB    Therapeutic Activity  Therapeutic Activity Performed: Yes  Therapeutic Activity 1: sitting EOB doing therex while maintaining balance.  Therapeutic Activity 2: use of toilet    Bed Mobility  Bed Mobility: Yes  Bed Mobility 1  Bed Mobility 1: Supine to sitting  Level of Assistance 1: Moderate assistance  Bed Mobility Comments 1: HOB elevated and use of draw sheet    Ambulation/Gait Training  Ambulation/Gait Training Performed: Yes  Ambulation/Gait Training 1  Surface 1: Level tile  Device 1: Rolling walker  Assistance 1: Minimum assistance  Quality of Gait 1: Narrow base of support, Inconsistent stride length, Decreased step length (decrease freddy)  Comments/Distance (ft) 1: pt ambulated 10 ft x 2 to and from the bathroom  Transfers  Transfer: Yes  Transfer 1  Transfer From 1: Sit to, Stand to  Transfer to 1: Stand, Sit  Technique 1: Sit to stand, Stand to sit  Transfer Device 1: Walker  Transfer Level of Assistance 1: Minimum assistance  Trials/Comments 1: x 2 with posterior lean  Transfers 2  Transfer From 2: Stand to, Toilet to  Transfer to 2: Toilet, Stand  Technique 2: Stand to sit, Sit to stand  Transfer Device 2: Walker  Transfer Level of Assistance 2: Minimum assistance              Outcome Measures:  [facility] Basic Mobility  Turning from your back to your side while in a flat bed without using bedrails: A little  Moving from lying on your back to sitting on the side of a flat bed without using bedrails: A little  Moving to and from bed to chair (including a wheelchair): A little  Standing up from a chair using your arms (e.g. wheelchair or bedside chair): A little  To walk in hospital room: A little  Climbing 3-5 steps with railing: Total  Basic Mobility - Total Score: 16    Education Documentation  Body Mechanics, taught by Viviana Thomas, PT at 6/5/2025  1:18 PM.  Learner: Patient  Readiness:  Acceptance  Method: Explanation  Response: Needs Reinforcement    Precautions, taught by Viviana Thomas PT at 6/5/2025  1:18 PM.  Learner: Patient  Readiness: Acceptance  Method: Explanation  Response: Needs Reinforcement    Mobility Training, taught by Viviana Thomas, PT at 6/5/2025  1:18 PM.  Learner: Patient  Readiness: Acceptance  Method: Explanation  Response: Needs Reinforcement    Education Comments  No comments found.        OP EDUCATION:       Encounter Problems       Encounter Problems (Active)       PT Problem       patient will perform supine <> sit with mod assist for increased independence with bed mobility upon DC  (Progressing)       Start:  06/04/25    Expected End:  06/18/25            patient will perform STS transfer with mod A for increased independence with transfers upon DC  (Progressing)       Start:  06/04/25    Expected End:  06/18/25            patient will ambulate 50 feet with use of RW and mod assist  in order to demonstrate ability to manage short household distances.  (Progressing)       Start:  06/04/25    Expected End:  06/18/25            patient will sit x 8 min without UE support and SB assist while performing UE task without LOB for functional carryover  (Progressing)       Start:  06/04/25    Expected End:  06/18/25               Pain - Adult

## 2025-06-05 NOTE — CARE PLAN
The patient's goals for the shift include will be safe and free from falls or injury during the shift    The clinical goals for the shift include Pt will be discharged by end of the Shift      Problem: Skin  Goal: Decreased wound size/increased tissue granulation at next dressing change  Flowsheets (Taken 6/5/2025 1316)  Decreased wound size/increased tissue granulation at next dressing change:   Protective dressings over bony prominences   Utilize specialty bed per algorithm  Goal: Participates in plan/prevention/treatment measures  Flowsheets (Taken 6/5/2025 1316)  Participates in plan/prevention/treatment measures:   Discuss with provider PT/OT consult   Increase activity/out of bed for meals   Elevate heels  Goal: Prevent/manage excess moisture  Flowsheets (Taken 6/5/2025 1316)  Prevent/manage excess moisture:   Moisturize dry skin   Cleanse incontinence/protect with barrier cream  Goal: Prevent/minimize sheer/friction injuries  Flowsheets (Taken 6/5/2025 1316)  Prevent/minimize sheer/friction injuries:   Complete micro-shifts as needed if patient unable. Adjust patient position to relieve pressure points, not a full turn   Use pull sheet   Increase activity/out of bed for meals

## 2025-06-05 NOTE — HH CARE COORDINATION
Home Care received a Referral for Nursing, Physical Therapy, Occupational Therapy, and Home Health Aide. We have processed the referral for a Start of Care on 6/8.     If you have any questions or concerns, please feel free to contact us at [phone]. Follow the prompts, enter your five digit zip code, and you will be directed to your care team on [facility].

## 2025-06-05 NOTE — CARE PLAN
Problem: Pain - Adult  Goal: Verbalizes/displays adequate comfort level or baseline comfort level  Outcome: Progressing     Problem: Safety - Adult  Goal: Free from fall injury  Outcome: Progressing     Problem: Fall/Injury  Goal: Not fall by end of shift  Outcome: Progressing  Goal: Be free from injury by end of the shift  Outcome: Progressing  Goal: Verbalize understanding of personal risk factors for fall in the hospital  Outcome: Progressing   The patient's goals for the shift include      The clinical goals for the shift include Patient will remain safe and free from fall/injury throughout the shift.

## 2025-06-07 ENCOUNTER — PATIENT OUTREACH (OUTPATIENT)
Dept: CARE COORDINATION | Age: 75
End: 2025-06-07
Payer: MEDICARE

## 2025-06-11 ENCOUNTER — TELEPHONE (OUTPATIENT)
Dept: HOME HEALTH SERVICES | Facility: HOME HEALTH | Age: 75
End: 2025-06-11
Payer: MEDICARE

## 2025-06-11 NOTE — TELEPHONE ENCOUNTER
Hello,    Your recent home care referral for Saige Yates has been made a Non Admit with  Home Care due to Inability to Contact Patient. Staff has called all available numbers and contacts for this patient over a 3-4 day period, left messages, and received no return call. If you have further questions, feel free to reach out to our office at [phone].     Thank you,   [facility]

## 2025-07-03 DIAGNOSIS — I63.9 CEREBROVASCULAR ACCIDENT (CVA), UNSPECIFIED MECHANISM (MULTI): Primary | ICD-10-CM

## 2025-07-03 DIAGNOSIS — E08.01 DIABETES MELLITUS DUE TO UNDERLYING CONDITION WITH HYPEROSMOLARITY AND COMA, WITHOUT LONG-TERM CURRENT USE OF INSULIN: ICD-10-CM

## 2025-07-25 ENCOUNTER — APPOINTMENT (OUTPATIENT)
Dept: PRIMARY CARE | Facility: CLINIC | Age: 75
End: 2025-07-25
Payer: MEDICARE

## 2025-07-25 VITALS
BODY MASS INDEX: 16.5 KG/M2 | DIASTOLIC BLOOD PRESSURE: 89 MMHG | WEIGHT: 99 LBS | HEART RATE: 78 BPM | SYSTOLIC BLOOD PRESSURE: 142 MMHG | HEIGHT: 65 IN

## 2025-07-25 DIAGNOSIS — D75.1 POLYCYTHEMIA: ICD-10-CM

## 2025-07-25 DIAGNOSIS — N18.30 CHRONIC RENAL IMPAIRMENT, STAGE 3 (MODERATE), UNSPECIFIED WHETHER STAGE 3A OR 3B CKD (MULTI): Primary | ICD-10-CM

## 2025-07-25 DIAGNOSIS — E04.1 THYROID NODULE: ICD-10-CM

## 2025-07-25 DIAGNOSIS — I63.9 CEREBROVASCULAR ACCIDENT (CVA), UNSPECIFIED MECHANISM (MULTI): ICD-10-CM

## 2025-07-25 DIAGNOSIS — I67.2 INTRACRANIAL ATHEROSCLEROSIS: ICD-10-CM

## 2025-07-25 DIAGNOSIS — R80.9 TYPE 2 DIABETES MELLITUS WITH MICROALBUMINURIA, WITHOUT LONG-TERM CURRENT USE OF INSULIN (MULTI): ICD-10-CM

## 2025-07-25 DIAGNOSIS — R63.4 WEIGHT LOSS: ICD-10-CM

## 2025-07-25 DIAGNOSIS — E11.29 TYPE 2 DIABETES MELLITUS WITH MICROALBUMINURIA, WITHOUT LONG-TERM CURRENT USE OF INSULIN (MULTI): ICD-10-CM

## 2025-07-25 DIAGNOSIS — I10 ESSENTIAL HYPERTENSION: ICD-10-CM

## 2025-07-25 PROCEDURE — 99214 OFFICE O/P EST MOD 30 MIN: CPT | Performed by: INTERNAL MEDICINE

## 2025-07-25 PROCEDURE — 1170F FXNL STATUS ASSESSED: CPT | Performed by: INTERNAL MEDICINE

## 2025-07-25 PROCEDURE — 1036F TOBACCO NON-USER: CPT | Performed by: INTERNAL MEDICINE

## 2025-07-25 PROCEDURE — G2211 COMPLEX E/M VISIT ADD ON: HCPCS | Performed by: INTERNAL MEDICINE

## 2025-07-25 PROCEDURE — 1124F ACP DISCUSS-NO DSCNMKR DOCD: CPT | Performed by: INTERNAL MEDICINE

## 2025-07-25 PROCEDURE — G0439 PPPS, SUBSEQ VISIT: HCPCS | Performed by: INTERNAL MEDICINE

## 2025-07-25 PROCEDURE — 1159F MED LIST DOCD IN RCRD: CPT | Performed by: INTERNAL MEDICINE

## 2025-07-25 PROCEDURE — 3079F DIAST BP 80-89 MM HG: CPT | Performed by: INTERNAL MEDICINE

## 2025-07-25 PROCEDURE — 3077F SYST BP >= 140 MM HG: CPT | Performed by: INTERNAL MEDICINE

## 2025-07-25 RX ORDER — ATORVASTATIN CALCIUM 40 MG/1
40 TABLET, FILM COATED ORAL
Qty: 90 TABLET | Refills: 0 | Status: SHIPPED | OUTPATIENT
Start: 2025-07-25

## 2025-07-25 RX ORDER — LISINOPRIL 40 MG/1
40 TABLET ORAL DAILY
Qty: 90 TABLET | Refills: 0 | Status: SHIPPED | OUTPATIENT
Start: 2025-07-25

## 2025-07-25 RX ORDER — METOPROLOL SUCCINATE 100 MG/1
100 TABLET, EXTENDED RELEASE ORAL DAILY
Qty: 90 TABLET | Refills: 0 | Status: SHIPPED | OUTPATIENT
Start: 2025-07-25

## 2025-07-25 RX ORDER — AMLODIPINE BESYLATE 5 MG/1
5 TABLET ORAL DAILY
Qty: 30 TABLET | Refills: 0 | Status: SHIPPED | OUTPATIENT
Start: 2025-07-25

## 2025-07-25 ASSESSMENT — ENCOUNTER SYMPTOMS
ACTIVITY CHANGE: 0
SHORTNESS OF BREATH: 0
DYSURIA: 0
CONSTIPATION: 1
HEADACHES: 0
LIGHT-HEADEDNESS: 0
FATIGUE: 0
APPETITE CHANGE: 0
LOSS OF SENSATION IN FEET: 0
DIFFICULTY URINATING: 0
SINUS PRESSURE: 0
OCCASIONAL FEELINGS OF UNSTEADINESS: 1
SINUS PAIN: 0
DEPRESSION: 0

## 2025-07-25 ASSESSMENT — LIFESTYLE VARIABLES
AUDIT-C TOTAL SCORE: 1
SKIP TO QUESTIONS 9-10: 1
HOW OFTEN DO YOU HAVE SIX OR MORE DRINKS ON ONE OCCASION: NEVER
HOW OFTEN DO YOU HAVE A DRINK CONTAINING ALCOHOL: MONTHLY OR LESS
HOW MANY STANDARD DRINKS CONTAINING ALCOHOL DO YOU HAVE ON A TYPICAL DAY: 1 OR 2

## 2025-07-25 ASSESSMENT — ACTIVITIES OF DAILY LIVING (ADL)
GROCERY_SHOPPING: NEEDS ASSISTANCE
MANAGING_FINANCES: NEEDS ASSISTANCE
TAKING_MEDICATION: INDEPENDENT
BATHING: INDEPENDENT
DOING_HOUSEWORK: NEEDS ASSISTANCE
DRESSING: INDEPENDENT

## 2025-07-25 NOTE — PROGRESS NOTES
Chief Complaint:   Chief Complaint   Patient presents with    Medicare Annual Wellness Visit Initial      HPI    The patient is being seen for the subsequent annual wellness visit and follow up.  Past Medical, Surgical and Family History: reviewed and updated in chart.   Interval History: Patient was hospitalized for falls earlier this years, records reviewed.  Medications and Supplements: Review of all medications by a prescribing practitioner or clinical pharmacist (such as prescriptions, OTCs, herbal therapies and supplements) documented in the medical record.    No, the patient is not using opioids.   Health Risk Assessment:. Paper HRA completed by patient and scanned into chart.   Depression/Suicide Screening:  .   Done  Advance care planning completed.     Past Medical History   Medical History[1]   Past Surgical History:   Surgical History[2]  Family History:   Family History[3]  Social History:   Tobacco Use: Low Risk  (7/25/2025)    Patient History     Smoking Tobacco Use: Never     Smokeless Tobacco Use: Never     Passive Exposure: Past      Social History     Substance and Sexual Activity   Alcohol Use Yes    Comment: on holidays        Allergies:   RX Allergies[4]     ROS   Review of Systems   Constitutional:  Negative for activity change, appetite change and fatigue.   HENT:  Negative for sinus pressure and sinus pain.    Respiratory:  Negative for shortness of breath.    Cardiovascular:  Negative for chest pain and leg swelling.   Gastrointestinal:  Positive for constipation (occasional).   Genitourinary:  Negative for difficulty urinating and dysuria.   Neurological:  Negative for light-headedness and headaches.        Objective   Vitals:    07/25/25 1335   BP: 142/89   Pulse: 78      BMI Readings from Last 15 Encounters:   07/25/25 16.47 kg/m²   06/03/25 18.97 kg/m²   11/07/24 19.11 kg/m²   10/16/24 18.70 kg/m²   10/10/24 19.14 kg/m²   08/30/24 18.47 kg/m²   08/12/24 18.64 kg/m²   08/07/24 18.30  kg/m²   07/31/24 19.05 kg/m²   06/12/24 18.30 kg/m²   06/12/24 18.45 kg/m²   05/15/24 18.47 kg/m²   05/01/24 19.04 kg/m²   03/20/24 19.58 kg/m²   03/15/24 19.24 kg/m²      (!) 44.9 kg (99 lb) (7/25/2025  1:35 PM)      Physical Exam  Physical Exam  Constitutional:       General: She is not in acute distress.     Comments: thin   HENT:      Mouth/Throat:      Mouth: Mucous membranes are moist.     Eyes:      Extraocular Movements: Extraocular movements intact.      Conjunctiva/sclera: Conjunctivae normal.       Cardiovascular:      Rate and Rhythm: Normal rate and regular rhythm.   Pulmonary:      Effort: Pulmonary effort is normal.      Breath sounds: Normal breath sounds.   Abdominal:      General: There is no distension.      Palpations: Abdomen is soft.      Tenderness: There is no abdominal tenderness.     Musculoskeletal:      Right lower leg: No edema.      Left lower leg: No edema.     Skin:     General: Skin is warm and dry.     Neurological:      General: No focal deficit present.      Mental Status: She is alert. Mental status is at baseline.          Labs:  CBC:  Recent Labs     06/05/25  0647 06/04/25  0638 06/03/25  0845   WBC 5.7 6.0 7.3  7.3   HGB 14.9 15.9 18.6*  18.6*   HCT 46.4* 52.1* 55.9*  55.9*    217 252  252   MCV 91 96 90  90     CMP:  Recent Labs     06/05/25  0647 06/04/25  0638 06/03/25  1715    146* 144   K 3.5 4.1 3.7    108* 108*   CO2 27 26 25   ANIONGAP 13 16 15   BUN 34* 30* 30*   CREATININE 1.82* 1.47* 1.56*   EGFR 29* 37* 34*   GLUCOSE 249* 166* 197*     Recent Labs     06/05/25  0647 06/04/25  0638 06/03/25  1715 06/03/25  0845 11/08/24  0221 11/07/24  0125 08/12/24  1503 06/12/24  1206 12/15/18  1002 12/14/18  1521   ALBUMIN 3.5 3.5 3.4 4.7   < > 4.1   < > 3.6   < > 4.4   ALKPHOS  --   --   --  121  --  98  --  107   < > 101   ALT  --   --   --  15  --  25  --  19   < > 20   AST  --   --   --  24  --  24  --  18   < > 19   BILITOT  --   --   --  1.1  --   "1.0  --  1.1   < > 0.9   LIPASE  --   --   --   --   --   --   --   --   --  56    < > = values in this interval not displayed.     Calcium/Phos:   Lab Results   Component Value Date    CALCIUM 10.0 06/05/2025    PHOS 2.8 06/05/2025      COAG:   Recent Labs     11/07/24  0125 06/12/24  1632 04/13/18  0744   INR 1.0 1.2* 1.0     CRP: No results found for: \"CRP\"   [unfilled]   ENDO:  Recent Labs     06/03/25  1415 06/03/25  0845 11/12/24  1016 07/01/24  0842 03/30/24  0957 12/28/23  1140 09/26/23  1049 02/14/23  1203 09/06/22  1042   TSH 0.38*  --   --   --  1.46  --  0.95  --  1.37   HGBA1C  --  8.7* 6.7* 11.0* 8.1*   < > 6.6*   < > 7.1*    < > = values in this interval not displayed.      CARDIAC:   Recent Labs     06/03/25  2248 06/03/25  1715 06/03/25  1225 06/12/24  1111 11/12/21  0829   TROPHS 131* 158* 86*   < >  --    BNP  --   --   --   --  33    < > = values in this interval not displayed.     Recent Labs     06/04/25  0638 06/03/25  0845 03/30/24  0957 09/26/23  1049 09/06/22  1042 11/13/21  0526   CHOL 155 236* 156 140 144 157   LDLF  --   --   --  57 61 75   LDLCALC 67  --  71  --   --   --    HDL 75.4 104.9 69.8 72.1 66.9 58.9   TRIG 61  --  75 57 80 118     No data recorded    Current Medications:  Current Medications[5]    Assessment and Plan  Saige was seen today for medicare annual wellness visit initial.  Diagnoses and all orders for this visit:  Essential hypertension  Cerebrovascular accident (CVA), unspecified mechanism (Multi)  Intracranial atherosclerosis       Immunizations:  Immunization History   Administered Date(s) Administered    Flu vaccine (IIV4), preservative free *Check age/dose* 11/07/2015    Flu vaccine, quadrivalent, high-dose, preservative free, age 65y+ (FLUZONE) 08/29/2023    Flu vaccine, trivalent, preservative free, HIGH-DOSE, age 65y+ (Fluzone) 10/08/2021    Flu vaccine, trivalent, preservative free, age 6 months and greater (Fluarix/Fluzone/Flulaval) 10/08/2016    " Influenza, Unspecified 10/01/2012    Pneumococcal conjugate vaccine, 13-valent (PREVNAR 13) 05/20/2017    Pneumococcal polysaccharide vaccine, 23-valent, age 2 years and older (PNEUMOVAX 23) 04/09/2016    Tdap vaccine, age 7 year and older (BOOSTRIX, ADACEL) 08/15/2013         Medicare Wellness Billing Compliance Satisfied    *This is a visual tool to show completion of required items on the day of the visit. Green checks will only appear on the date of visit.    Review all medications by prescribing practitioner or clinical pharmacist (such as prescriptions, OTCs, herbal therapies and supplements) documented in the medical record    Past Medical, Surgical, and Family History reviewed and updated in chart    Tobacco Use Reviewed    Alcohol Use Reviewed    Illicit Drug Use Reviewed    PHQ2/9    Falls in Last Year Reviewed    Home Safety Risk Factors Reviewed    Cognitive Impairment Reviewed    Patient Self Assessment and Health Status    Current Diet Reviewed    Exercise Frequency    ADL - Hearing Impairment    ADL - Bathing    ADL - Dressing    ADL - Walks in Home    IADL - Managing Finances    IADL - Grocery Shopping    IADL - Taking Medications    IADL - Doing Housework    Vision Screening       Pt reports eating well-balanced diet, eats pancakes, fruit, yogurt, hard boiled eggs, fresh vegetables, lean meats, chicken sandwiches. Discussed trying to add high protein drinks, per son she does sometimes have Ensure.    Some concern for significant weight loss, down 15 pounds since appt last year. Per pt and family she still has good appetite.    She has been to PT in the past after falls and tries to be careful now when standing up from chairs, etc.    Hasn't been walking as much lately, sometimes will get on treadmill and walk, has not been as interested in it lately.    Saige was seen today for medicare annual wellness visit initial.  Diagnoses and all orders for this visit:  Chronic renal  impairment, stage 3 (moderate), unspecified whether stage 3a or 3b CKD (Multi) (Primary)  Essential hypertension  -     amLODIPine (Norvasc) 5 mg tablet; Take 1 tablet (5 mg) by mouth once daily.  -     lisinopril 40 mg tablet; Take 1 tablet (40 mg) by mouth once daily.  Cerebrovascular accident (CVA), unspecified mechanism (Multi)  -     atorvastatin (Lipitor) 40 mg tablet; Take 1 tablet (40 mg) by mouth early in the morning..  Intracranial atherosclerosis  -     metoprolol succinate XL (Toprol-XL) 100 mg 24 hr tablet; Take 1 tablet (100 mg) by mouth once daily. DO NOT CRUSH OR CHEW  Polycythemia  -     CBC; Future  -     Referral To Hematology and Oncology; Future  -     CBC  Type 2 diabetes mellitus with microalbuminuria, without long-term current use of insulin (Multi)  -     Referral to Endocrinology; Future  Thyroid nodule  -     US thyroid; Future  Weight loss  -     vitamin B complex-vitamin C-folic acid (Nephrocaps) 1 mg capsule; Take 1 capsule by mouth once daily.  - Referral placed for heme/onc given hx polycythemia  - Referral to endocrinology given uncontrolled diabetes  - Labs as above  - Weight loss could be associated w/ uncontrolled diabetes  - Refills ordered today    Lori Vela DO  IM PGY-2       [1]   Past Medical History:  Diagnosis Date    Abnormal findings on diagnostic imaging of other specified body structures 12/08/2018    Abnormal chest CT    Abnormal levels of other serum enzymes 01/14/2017    Elevated liver enzymes    Body mass index (BMI) 21.0-21.9, adult 11/24/2021    BMI 21.0-21.9, adult    Body mass index (BMI) 22.0-22.9, adult 06/06/2022    Body mass index (BMI) of 22.0 to 22.9 in adult    Body mass index (BMI) 23.0-23.9, adult 07/07/2021    BMI 23.0-23.9, adult    Body mass index (BMI) 24.0-24.9, adult 09/06/2019    BMI 24.0-24.9, adult    Encounter for therapeutic drug level monitoring 03/12/2018    Encounter for monitoring anastrozole therapy    Essential (primary) hypertension  08/24/2017    Uncontrolled hypertension    Other cerebral infarction due to occlusion or stenosis of small artery 04/08/2019    Lacunar infarction    Other conditions influencing health status 01/22/2018    Malignant neoplasm of left female breast, unspecified site of breast    Other specified disorders of breast 04/26/2016    Postoperative breast asymmetry    Personal history of diseases of the skin and subcutaneous tissue 11/23/2013    History of atopic dermatitis    Personal history of other specified conditions 11/07/2015    History of chest pain    Personal history of transient ischemic attack (TIA), and cerebral infarction without residual deficits 12/07/2019    History of transient cerebral ischemia    Unspecified lump in the left breast, unspecified quadrant 08/24/2017    Breast mass, left   [2]   Past Surgical History:  Procedure Laterality Date    BREAST BIOPSY  02/26/2015    Biopsy Breast Percutaneous Needle Core    BREAST LUMPECTOMY Left     COLONOSCOPY  05/20/2017    Complete Colonoscopy    GALLBLADDER SURGERY  02/09/2015    Gallbladder Surgery    MR HEAD ANGIO WO IV CONTRAST  11/12/2021    MR HEAD ANGIO WO IV CONTRAST 11/12/2021 [facility]    OTHER SURGICAL HISTORY  04/23/2018    Parathyroid Resection    OTHER SURGICAL HISTORY  04/27/2015    Left Breast Partial Mastectomy    SENTINEL LYMPH NODE BIOPSY  04/27/2015    [place] Lymph Node Biopsy   [3]   Family History  Problem Relation Name Age of Onset    Other (cardiovascular disorder) Father      Stroke Father      Arthritis Sister     [4]   Allergies  Allergen Reactions    Other Unknown     HAIR DYE    Penicillins Unknown   [5]   Current Outpatient Medications   Medication Sig Dispense Refill    amLODIPine (Norvasc) 5 mg tablet Take 1 tablet (5 mg) by mouth once daily. 30 tablet 0    aspirin 81 mg EC tablet Take 1 tablet (81 mg) by mouth once daily. 90 tablet 0    atorvastatin (Lipitor) 40 mg tablet Take 1 tablet (40 mg) by mouth early in  the morning.. 90 tablet 0    latanoprost (Xalatan) 0.005 % ophthalmic solution Administer 1 drop into both eyes once daily at bedtime.      lisinopril 40 mg tablet Take 1 tablet (40 mg) by mouth once daily. 90 tablet 0    metoprolol succinate XL (Toprol-XL) 100 mg 24 hr tablet Take 1 tablet (100 mg) by mouth once daily. DO NOT CRUSH OR CHEW 90 tablet 0    dulaglutide (Trulicity) 0.75 mg/0.5 mL pen injector Inject 0.75 mg under the skin 1 (one) time per week. (Patient not taking: Reported on 7/25/2025) 6 mL 3    empagliflozin (Jardiance) 10 mg Take 1 tablet (10 mg) by mouth once daily. (Patient not taking: Reported on 7/25/2025) 90 tablet 3     No current facility-administered medications for this visit.

## 2025-07-25 NOTE — PROGRESS NOTES
I reviewed the resident/fellow's documentation and discussed the patient with the resident/fellow. I agree with the resident/fellow's medical decision making as documented in the note.  As the attending physician, I certify that I personally reviewed the patient's history and personally examined the patient to confirm the physical findings described above, and that I reviewed the relevant imaging studies and available reports. I also discussed the differential diagnosis and all of the proposed management plans with the patient and individuals accompanying the patient to this visit. They had the opportunity to ask questions about the proposed management plans and to have those questions answered.   Concerned about her declining health  She is losing weight  I think there is a medication compliance issue  Monitor closely  Med rec completed  Labs ordered  .To the extent possible, my findings/opinions, differential diagnosis, proposed workup and available results, treatment options and rationale were discussed in sufficient length to satisfy the patient's (and family's when so noted) interest. I've solicited questions in order to complete the patient's/family's understanding and offered further clarification and support should the need arise.     Scarlett Bangura MD

## 2025-07-30 ENCOUNTER — OFFICE VISIT (OUTPATIENT)
Dept: CARDIOLOGY | Facility: HOSPITAL | Age: 75
End: 2025-07-30
Payer: MEDICARE

## 2025-07-30 VITALS
WEIGHT: 101 LBS | BODY MASS INDEX: 16.83 KG/M2 | OXYGEN SATURATION: 97 % | SYSTOLIC BLOOD PRESSURE: 125 MMHG | HEIGHT: 65 IN | DIASTOLIC BLOOD PRESSURE: 87 MMHG | HEART RATE: 89 BPM

## 2025-07-30 DIAGNOSIS — Z86.79 HISTORY OF CARDIOMYOPATHY: Primary | ICD-10-CM

## 2025-07-30 DIAGNOSIS — I77.810 ASCENDING AORTA DILATATION: Chronic | ICD-10-CM

## 2025-07-30 DIAGNOSIS — I10 ESSENTIAL HYPERTENSION: ICD-10-CM

## 2025-07-30 DIAGNOSIS — E78.5 HYPERLIPIDEMIA, UNSPECIFIED HYPERLIPIDEMIA TYPE: ICD-10-CM

## 2025-07-30 PROCEDURE — 3074F SYST BP LT 130 MM HG: CPT | Performed by: INTERNAL MEDICINE

## 2025-07-30 PROCEDURE — 99213 OFFICE O/P EST LOW 20 MIN: CPT

## 2025-07-30 PROCEDURE — 3079F DIAST BP 80-89 MM HG: CPT | Performed by: INTERNAL MEDICINE

## 2025-07-30 PROCEDURE — G2211 COMPLEX E/M VISIT ADD ON: HCPCS | Performed by: INTERNAL MEDICINE

## 2025-07-30 PROCEDURE — 1036F TOBACCO NON-USER: CPT | Performed by: INTERNAL MEDICINE

## 2025-07-30 PROCEDURE — 99214 OFFICE O/P EST MOD 30 MIN: CPT | Performed by: INTERNAL MEDICINE

## 2025-07-30 PROCEDURE — 1159F MED LIST DOCD IN RCRD: CPT | Performed by: INTERNAL MEDICINE

## 2025-07-30 NOTE — PROGRESS NOTES
"Chief Complaint:   Follow-up     History Of Present Illness:    Saige Yates is a 74 y.o. female here for followup. She has a history of diabetes mellitus type II, hypertension, hyperlipidemia, CVA, breast cancer s/p lumpectomy and radiation '15, hyperandrogenism, CKD stage III, TAA, and polycythemia vera. She was hospitalized 6/2024 with an apparent stroke. Her ejection fraction was noted to be reduced. She was discharged on adjusted therapies but had a second admission at an OSH for altered mental status and apparent recurrent stroke.       She reports no complaints. She denies cardiovascular symptoms. She was hospitalized last month for encephalopathy and uncontrolled hypertension in the setting of being off her BP medication due to pharmacy issues.    Last Recorded Vitals:  Vitals:    07/30/25 1022   BP: 125/87   BP Location: Right arm   Patient Position: Sitting   BP Cuff Size: Adult   Pulse: 89   SpO2: 97%   Weight: 45.8 kg (101 lb)   Height: 1.651 m (5' 5\")             Allergies:  Other and Penicillins    Outpatient Medications:  Current Outpatient Medications   Medication Instructions    amLODIPine (NORVASC) 5 mg, oral, Daily    aspirin 81 mg, oral, Daily    atorvastatin (LIPITOR) 40 mg, oral, Daily (0630)    empagliflozin (JARDIANCE) 10 mg, oral, Daily    latanoprost (Xalatan) 0.005 % ophthalmic solution Administer 1 drop into both eyes once daily at bedtime.    lisinopril 40 mg, oral, Daily    metoprolol succinate XL (TOPROL-XL) 100 mg, oral, Daily, DO NOT CRUSH OR CHEW    Trulicity 0.75 mg, subcutaneous, Once Weekly    vitamin B complex-vitamin C-folic acid (Nephrocaps) 1 mg capsule 1 capsule, oral, Daily         Physical Exam:  Gen Well appearing septuagenarian female sitting up in NAD. Body mass index is 16.81 kg/m².   CV rrr. No m/r/g appreciated. No JVD or leg edema.  Pulm Lungs clear with normal respiratory effort.  Neuro Alert and conversant. Grossly nonfocal.       I reviewed most recent imaging " / labs / and office notes as well as details of any recent hospitalizations or ED visits.    Assessment/Plan   1.  History of cardiomyopathy  EF normalized by last TTE (OSH). Her present regimen is to continue.     2. Ascending aortic aneurysm  4 cm 6/2024. We discussed pursing repair should her aneurysm become large enough and she states she would decline in that instance and she indeed would not be a good candidate for it. As such, no plans for repeat assessment. BP management as below. On statin.    3. Hypertension   BP well controlled. Her present regimen is to continue.     4. Hyperlipidemia   On statin. Last LDL acceptable.        Follow-up 1 year        Dillon Forte MD

## 2025-08-26 ENCOUNTER — APPOINTMENT (OUTPATIENT)
Dept: CARDIOLOGY | Facility: HOSPITAL | Age: 75
DRG: 637 | End: 2025-08-26
Payer: MEDICARE

## 2025-08-26 ENCOUNTER — HOSPITAL ENCOUNTER (INPATIENT)
Facility: HOSPITAL | Age: 75
LOS: 4 days | Discharge: NURSING, REHAB OR LONG-TERM CARE | End: 2025-08-31
Attending: EMERGENCY MEDICINE | Admitting: HOSPITALIST
Payer: MEDICARE

## 2025-08-26 ENCOUNTER — APPOINTMENT (OUTPATIENT)
Dept: RADIOLOGY | Facility: HOSPITAL | Age: 75
DRG: 637 | End: 2025-08-26
Payer: MEDICARE

## 2025-08-26 DIAGNOSIS — R73.9 HYPERGLYCEMIA: ICD-10-CM

## 2025-08-26 DIAGNOSIS — R41.82 ALTERED MENTAL STATUS, UNSPECIFIED ALTERED MENTAL STATUS TYPE: Primary | ICD-10-CM

## 2025-08-26 LAB
ALBUMIN SERPL BCP-MCNC: 3.8 G/DL (ref 3.4–5)
ALP SERPL-CCNC: 121 U/L (ref 33–136)
ALT SERPL W P-5'-P-CCNC: 146 U/L (ref 7–45)
ANION GAP BLDV CALCULATED.4IONS-SCNC: 6 MMOL/L (ref 10–25)
ANION GAP BLDV CALCULATED.4IONS-SCNC: ABNORMAL MMOL/L
ANION GAP SERPL CALC-SCNC: 15 MMOL/L (ref 10–20)
APAP SERPL-MCNC: <10 UG/ML (ref ?–30)
APPEARANCE UR: CLEAR
AST SERPL W P-5'-P-CCNC: 143 U/L (ref 9–39)
B-OH-BUTYR SERPL-SCNC: 1.72 MMOL/L (ref 0.02–0.27)
BASE EXCESS BLDV CALC-SCNC: 7.2 MMOL/L (ref -2–3)
BASE EXCESS BLDV CALC-SCNC: ABNORMAL MMOL/L
BASOPHILS # BLD AUTO: 0.03 X10*3/UL (ref 0–0.1)
BASOPHILS NFR BLD AUTO: 0.3 %
BILIRUB SERPL-MCNC: 1.4 MG/DL (ref 0–1.2)
BILIRUB UR STRIP.AUTO-MCNC: NEGATIVE MG/DL
BODY TEMPERATURE: 37 DEGREES CELSIUS
BODY TEMPERATURE: 37 DEGREES CELSIUS
BUN SERPL-MCNC: 52 MG/DL (ref 6–23)
CA-I BLDV-SCNC: 1.39 MMOL/L (ref 1.1–1.33)
CA-I BLDV-SCNC: ABNORMAL MMOL/L
CALCIUM SERPL-MCNC: 10.4 MG/DL (ref 8.6–10.3)
CARDIAC TROPONIN I PNL SERPL HS: 24 NG/L (ref 0–13)
CARDIAC TROPONIN I PNL SERPL HS: 25 NG/L (ref 0–13)
CHLORIDE BLDV-SCNC: 110 MMOL/L (ref 98–107)
CHLORIDE BLDV-SCNC: ABNORMAL MMOL/L
CHLORIDE SERPL-SCNC: 111 MMOL/L (ref 98–107)
CO2 SERPL-SCNC: 27 MMOL/L (ref 21–32)
COLOR UR: COLORLESS
CREAT SERPL-MCNC: 2.05 MG/DL (ref 0.5–1.05)
EGFRCR SERPLBLD CKD-EPI 2021: 25 ML/MIN/1.73M*2
EOSINOPHIL # BLD AUTO: 0.02 X10*3/UL (ref 0–0.4)
EOSINOPHIL NFR BLD AUTO: 0.2 %
ERYTHROCYTE [DISTWIDTH] IN BLOOD BY AUTOMATED COUNT: 12.9 % (ref 11.5–14.5)
ETHANOL SERPL-MCNC: <10 MG/DL
FLUAV RNA RESP QL NAA+PROBE: NOT DETECTED
FLUBV RNA RESP QL NAA+PROBE: NOT DETECTED
GLUCOSE BLD MANUAL STRIP-MCNC: >600 MG/DL (ref 74–99)
GLUCOSE BLDV-MCNC: 546 MG/DL (ref 74–99)
GLUCOSE BLDV-MCNC: ABNORMAL MG/DL
GLUCOSE SERPL-MCNC: 552 MG/DL (ref 74–99)
GLUCOSE UR STRIP.AUTO-MCNC: ABNORMAL MG/DL
HCO3 BLDV-SCNC: 33.2 MMOL/L (ref 22–26)
HCO3 BLDV-SCNC: ABNORMAL MMOL/L
HCT VFR BLD AUTO: 51.6 % (ref 36–46)
HCT VFR BLD EST: 50 % (ref 36–46)
HCT VFR BLD EST: 52 % (ref 36–46)
HGB BLD-MCNC: 16.3 G/DL (ref 12–16)
HGB BLDV-MCNC: 16.7 G/DL (ref 12–16)
HGB BLDV-MCNC: 17.2 G/DL (ref 12–16)
IMM GRANULOCYTES # BLD AUTO: 0.02 X10*3/UL (ref 0–0.5)
IMM GRANULOCYTES NFR BLD AUTO: 0.2 % (ref 0–0.9)
INHALED O2 CONCENTRATION: 21 %
INHALED O2 CONCENTRATION: 21 %
KETONES UR STRIP.AUTO-MCNC: NEGATIVE MG/DL
LACTATE BLDV-SCNC: 2.5 MMOL/L (ref 0.4–2)
LACTATE BLDV-SCNC: ABNORMAL MMOL/L
LACTATE SERPL-SCNC: 1.4 MMOL/L (ref 0.4–2)
LEUKOCYTE ESTERASE UR QL STRIP.AUTO: NEGATIVE
LYMPHOCYTES # BLD AUTO: 1.41 X10*3/UL (ref 0.8–3)
LYMPHOCYTES NFR BLD AUTO: 16.3 %
MAGNESIUM SERPL-MCNC: 2.13 MG/DL (ref 1.6–2.4)
MCH RBC QN AUTO: 29.8 PG (ref 26–34)
MCHC RBC AUTO-ENTMCNC: 31.6 G/DL (ref 32–36)
MCV RBC AUTO: 94 FL (ref 80–100)
MONOCYTES # BLD AUTO: 0.64 X10*3/UL (ref 0.05–0.8)
MONOCYTES NFR BLD AUTO: 7.4 %
NEUTROPHILS # BLD AUTO: 6.51 X10*3/UL (ref 1.6–5.5)
NEUTROPHILS NFR BLD AUTO: 75.6 %
NITRITE UR QL STRIP.AUTO: NEGATIVE
NRBC BLD-RTO: 0 /100 WBCS (ref 0–0)
OXYHGB MFR BLDV: 29 % (ref 45–75)
OXYHGB MFR BLDV: 51.3 % (ref 45–75)
PCO2 BLDV: 50 MM HG (ref 41–51)
PCO2 BLDV: ABNORMAL MM[HG]
PH BLDV: 7.43 PH (ref 7.33–7.43)
PH BLDV: ABNORMAL [PH]
PH UR STRIP.AUTO: 7 [PH]
PHOSPHATE SERPL-MCNC: 3.8 MG/DL (ref 2.5–4.9)
PLATELET # BLD AUTO: 183 X10*3/UL (ref 150–450)
PO2 BLDV: 30 MM HG (ref 35–45)
PO2 BLDV: ABNORMAL MM[HG]
POTASSIUM BLDV-SCNC: 4.5 MMOL/L (ref 3.5–5.3)
POTASSIUM BLDV-SCNC: ABNORMAL MMOL/L
POTASSIUM SERPL-SCNC: 4.6 MMOL/L (ref 3.5–5.3)
PROT SERPL-MCNC: 7 G/DL (ref 6.4–8.2)
PROT UR STRIP.AUTO-MCNC: ABNORMAL MG/DL
RBC # BLD AUTO: 5.47 X10*6/UL (ref 4–5.2)
RBC # UR STRIP.AUTO: NEGATIVE MG/DL
RBC #/AREA URNS AUTO: NORMAL /HPF
SALICYLATES SERPL-MCNC: <3 MG/DL (ref ?–20)
SAO2 % BLDV: 29 % (ref 45–75)
SAO2 % BLDV: 52 % (ref 45–75)
SARS-COV-2 RNA RESP QL NAA+PROBE: NOT DETECTED
SODIUM BLDV-SCNC: 145 MMOL/L (ref 136–145)
SODIUM BLDV-SCNC: ABNORMAL MMOL/L
SODIUM SERPL-SCNC: 148 MMOL/L (ref 136–145)
SP GR UR STRIP.AUTO: 1.01
UROBILINOGEN UR STRIP.AUTO-MCNC: NORMAL MG/DL
WBC # BLD AUTO: 8.6 X10*3/UL (ref 4.4–11.3)
WBC #/AREA URNS AUTO: NORMAL /HPF

## 2025-08-26 PROCEDURE — 70450 CT HEAD/BRAIN W/O DYE: CPT | Performed by: RADIOLOGY

## 2025-08-26 PROCEDURE — 74176 CT ABD & PELVIS W/O CONTRAST: CPT

## 2025-08-26 PROCEDURE — 71045 X-RAY EXAM CHEST 1 VIEW: CPT

## 2025-08-26 PROCEDURE — 84100 ASSAY OF PHOSPHORUS: CPT | Performed by: EMERGENCY MEDICINE

## 2025-08-26 PROCEDURE — 83036 HEMOGLOBIN GLYCOSYLATED A1C: CPT | Mod: AHULAB | Performed by: INTERNAL MEDICINE

## 2025-08-26 PROCEDURE — 70450 CT HEAD/BRAIN W/O DYE: CPT

## 2025-08-26 PROCEDURE — 80143 DRUG ASSAY ACETAMINOPHEN: CPT | Performed by: EMERGENCY MEDICINE

## 2025-08-26 PROCEDURE — 2500000004 HC RX 250 GENERAL PHARMACY W/ HCPCS (ALT 636 FOR OP/ED): Performed by: EMERGENCY MEDICINE

## 2025-08-26 PROCEDURE — 82947 ASSAY GLUCOSE BLOOD QUANT: CPT

## 2025-08-26 PROCEDURE — 99285 EMERGENCY DEPT VISIT HI MDM: CPT | Mod: 25 | Performed by: EMERGENCY MEDICINE

## 2025-08-26 PROCEDURE — 71250 CT THORAX DX C-: CPT | Mod: FOREIGN READ | Performed by: RADIOLOGY

## 2025-08-26 PROCEDURE — 82010 KETONE BODYS QUAN: CPT | Performed by: EMERGENCY MEDICINE

## 2025-08-26 PROCEDURE — 36415 COLL VENOUS BLD VENIPUNCTURE: CPT | Performed by: EMERGENCY MEDICINE

## 2025-08-26 PROCEDURE — 2500000002 HC RX 250 W HCPCS SELF ADMINISTERED DRUGS (ALT 637 FOR MEDICARE OP, ALT 636 FOR OP/ED): Performed by: EMERGENCY MEDICINE

## 2025-08-26 PROCEDURE — 85025 COMPLETE CBC W/AUTO DIFF WBC: CPT | Performed by: EMERGENCY MEDICINE

## 2025-08-26 PROCEDURE — 84484 ASSAY OF TROPONIN QUANT: CPT | Performed by: EMERGENCY MEDICINE

## 2025-08-26 PROCEDURE — 87636 SARSCOV2 & INF A&B AMP PRB: CPT | Performed by: EMERGENCY MEDICINE

## 2025-08-26 PROCEDURE — 74176 CT ABD & PELVIS W/O CONTRAST: CPT | Mod: FOREIGN READ | Performed by: RADIOLOGY

## 2025-08-26 PROCEDURE — 71045 X-RAY EXAM CHEST 1 VIEW: CPT | Mod: FOREIGN READ | Performed by: RADIOLOGY

## 2025-08-26 PROCEDURE — 84132 ASSAY OF SERUM POTASSIUM: CPT | Performed by: EMERGENCY MEDICINE

## 2025-08-26 PROCEDURE — 83605 ASSAY OF LACTIC ACID: CPT | Performed by: EMERGENCY MEDICINE

## 2025-08-26 PROCEDURE — 81001 URINALYSIS AUTO W/SCOPE: CPT | Performed by: EMERGENCY MEDICINE

## 2025-08-26 PROCEDURE — 83735 ASSAY OF MAGNESIUM: CPT | Performed by: EMERGENCY MEDICINE

## 2025-08-26 PROCEDURE — 93005 ELECTROCARDIOGRAM TRACING: CPT

## 2025-08-26 RX ADMIN — INSULIN HUMAN 10 UNITS: 100 INJECTION, SOLUTION PARENTERAL at 22:55

## 2025-08-26 RX ADMIN — SODIUM CHLORIDE, SODIUM LACTATE, POTASSIUM CHLORIDE, AND CALCIUM CHLORIDE 1000 ML: .6; .31; .03; .02 INJECTION, SOLUTION INTRAVENOUS at 21:03

## 2025-08-26 ASSESSMENT — PAIN SCALES - GENERAL: PAINLEVEL_OUTOF10: 0 - NO PAIN

## 2025-08-26 ASSESSMENT — PAIN - FUNCTIONAL ASSESSMENT: PAIN_FUNCTIONAL_ASSESSMENT: 0-10

## 2025-08-27 ENCOUNTER — APPOINTMENT (OUTPATIENT)
Dept: RADIOLOGY | Facility: HOSPITAL | Age: 75
DRG: 637 | End: 2025-08-27
Payer: MEDICARE

## 2025-08-27 LAB
ANION GAP SERPL CALC-SCNC: 12 MMOL/L (ref 10–20)
ANION GAP SERPL CALC-SCNC: 13 MMOL/L (ref 10–20)
ATRIAL RATE: 72 BPM
BUN SERPL-MCNC: 43 MG/DL (ref 6–23)
BUN SERPL-MCNC: 47 MG/DL (ref 6–23)
CALCIUM SERPL-MCNC: 10.5 MG/DL (ref 8.6–10.3)
CALCIUM SERPL-MCNC: 10.6 MG/DL (ref 8.6–10.3)
CHLORIDE SERPL-SCNC: 111 MMOL/L (ref 98–107)
CHLORIDE SERPL-SCNC: 117 MMOL/L (ref 98–107)
CO2 SERPL-SCNC: 25 MMOL/L (ref 21–32)
CO2 SERPL-SCNC: 27 MMOL/L (ref 21–32)
CREAT SERPL-MCNC: 1.67 MG/DL (ref 0.5–1.05)
CREAT SERPL-MCNC: 1.7 MG/DL (ref 0.5–1.05)
EGFRCR SERPLBLD CKD-EPI 2021: 31 ML/MIN/1.73M*2
EGFRCR SERPLBLD CKD-EPI 2021: 32 ML/MIN/1.73M*2
EST. AVERAGE GLUCOSE BLD GHB EST-MCNC: 326 MG/DL
GLUCOSE BLD MANUAL STRIP-MCNC: 179 MG/DL (ref 74–99)
GLUCOSE BLD MANUAL STRIP-MCNC: 212 MG/DL (ref 74–99)
GLUCOSE BLD MANUAL STRIP-MCNC: 256 MG/DL (ref 74–99)
GLUCOSE BLD MANUAL STRIP-MCNC: 265 MG/DL (ref 74–99)
GLUCOSE BLD MANUAL STRIP-MCNC: 267 MG/DL (ref 74–99)
GLUCOSE BLD MANUAL STRIP-MCNC: 326 MG/DL (ref 74–99)
GLUCOSE BLD MANUAL STRIP-MCNC: 356 MG/DL (ref 74–99)
GLUCOSE SERPL-MCNC: 235 MG/DL (ref 74–99)
GLUCOSE SERPL-MCNC: 378 MG/DL (ref 74–99)
HBA1C MFR BLD: 13 % (ref ?–5.7)
P AXIS: 52 DEGREES
P OFFSET: 206 MS
P ONSET: 149 MS
POTASSIUM SERPL-SCNC: 3.6 MMOL/L (ref 3.5–5.3)
POTASSIUM SERPL-SCNC: 3.8 MMOL/L (ref 3.5–5.3)
PR INTERVAL: 130 MS
PTH-INTACT SERPL-MCNC: 114.3 PG/ML (ref 18.5–88)
Q ONSET: 214 MS
QRS COUNT: 12 BEATS
QRS DURATION: 82 MS
QT INTERVAL: 432 MS
QTC CALCULATION(BAZETT): 473 MS
QTC FREDERICIA: 459 MS
R AXIS: -17 DEGREES
SODIUM SERPL-SCNC: 147 MMOL/L (ref 136–145)
SODIUM SERPL-SCNC: 150 MMOL/L (ref 136–145)
T AXIS: -75 DEGREES
T OFFSET: 430 MS
TSH SERPL-ACNC: 0.09 MIU/L (ref 0.44–3.98)
VENTRICULAR RATE: 72 BPM

## 2025-08-27 PROCEDURE — 99222 1ST HOSP IP/OBS MODERATE 55: CPT

## 2025-08-27 PROCEDURE — 36415 COLL VENOUS BLD VENIPUNCTURE: CPT | Performed by: HOSPITALIST

## 2025-08-27 PROCEDURE — 2500000002 HC RX 250 W HCPCS SELF ADMINISTERED DRUGS (ALT 637 FOR MEDICARE OP, ALT 636 FOR OP/ED): Performed by: HOSPITALIST

## 2025-08-27 PROCEDURE — 82947 ASSAY GLUCOSE BLOOD QUANT: CPT

## 2025-08-27 PROCEDURE — 2500000004 HC RX 250 GENERAL PHARMACY W/ HCPCS (ALT 636 FOR OP/ED): Performed by: HOSPITALIST

## 2025-08-27 PROCEDURE — 2060000001 HC INTERMEDIATE ICU ROOM DAILY

## 2025-08-27 PROCEDURE — 83970 ASSAY OF PARATHORMONE: CPT | Mod: AHULAB | Performed by: INTERNAL MEDICINE

## 2025-08-27 PROCEDURE — 99223 1ST HOSP IP/OBS HIGH 75: CPT | Performed by: HOSPITALIST

## 2025-08-27 PROCEDURE — 99222 1ST HOSP IP/OBS MODERATE 55: CPT | Performed by: UROLOGY

## 2025-08-27 PROCEDURE — 82374 ASSAY BLOOD CARBON DIOXIDE: CPT | Performed by: HOSPITALIST

## 2025-08-27 PROCEDURE — 76700 US EXAM ABDOM COMPLETE: CPT

## 2025-08-27 PROCEDURE — 84443 ASSAY THYROID STIM HORMONE: CPT | Performed by: INTERNAL MEDICINE

## 2025-08-27 PROCEDURE — 76700 US EXAM ABDOM COMPLETE: CPT | Performed by: RADIOLOGY

## 2025-08-27 PROCEDURE — 2500000001 HC RX 250 WO HCPCS SELF ADMINISTERED DRUGS (ALT 637 FOR MEDICARE OP): Performed by: HOSPITALIST

## 2025-08-27 RX ORDER — INSULIN LISPRO 100 [IU]/ML
6 INJECTION, SOLUTION INTRAVENOUS; SUBCUTANEOUS ONCE
Status: COMPLETED | OUTPATIENT
Start: 2025-08-27 | End: 2025-08-27

## 2025-08-27 RX ORDER — ACETAMINOPHEN 160 MG/5ML
650 SOLUTION ORAL EVERY 4 HOURS PRN
Status: DISCONTINUED | OUTPATIENT
Start: 2025-08-27 | End: 2025-08-27

## 2025-08-27 RX ORDER — DEXTROSE 50 % IN WATER (D50W) INTRAVENOUS SYRINGE
25
Status: DISCONTINUED | OUTPATIENT
Start: 2025-08-27 | End: 2025-08-31 | Stop reason: HOSPADM

## 2025-08-27 RX ORDER — ONDANSETRON 4 MG/1
4 TABLET, FILM COATED ORAL EVERY 8 HOURS PRN
Status: DISCONTINUED | OUTPATIENT
Start: 2025-08-27 | End: 2025-08-31 | Stop reason: HOSPADM

## 2025-08-27 RX ORDER — ATORVASTATIN CALCIUM 40 MG/1
40 TABLET, FILM COATED ORAL DAILY
Status: DISCONTINUED | OUTPATIENT
Start: 2025-08-27 | End: 2025-08-31 | Stop reason: HOSPADM

## 2025-08-27 RX ORDER — DEXTROSE MONOHYDRATE 50 MG/ML
50 INJECTION, SOLUTION INTRAVENOUS CONTINUOUS
Status: ACTIVE | OUTPATIENT
Start: 2025-08-27 | End: 2025-08-28

## 2025-08-27 RX ORDER — SODIUM CHLORIDE 450 MG/100ML
75 INJECTION, SOLUTION INTRAVENOUS CONTINUOUS
Status: DISCONTINUED | OUTPATIENT
Start: 2025-08-27 | End: 2025-08-27

## 2025-08-27 RX ORDER — METOPROLOL SUCCINATE 50 MG/1
100 TABLET, EXTENDED RELEASE ORAL DAILY
Status: DISCONTINUED | OUTPATIENT
Start: 2025-08-27 | End: 2025-08-31 | Stop reason: HOSPADM

## 2025-08-27 RX ORDER — ACETAMINOPHEN 325 MG/1
650 TABLET ORAL EVERY 4 HOURS PRN
Status: DISCONTINUED | OUTPATIENT
Start: 2025-08-27 | End: 2025-08-27

## 2025-08-27 RX ORDER — LATANOPROST 50 UG/ML
1 SOLUTION/ DROPS OPHTHALMIC NIGHTLY
Status: DISCONTINUED | OUTPATIENT
Start: 2025-08-27 | End: 2025-08-31 | Stop reason: HOSPADM

## 2025-08-27 RX ORDER — LISINOPRIL 20 MG/1
40 TABLET ORAL DAILY
Status: DISCONTINUED | OUTPATIENT
Start: 2025-08-27 | End: 2025-08-31 | Stop reason: HOSPADM

## 2025-08-27 RX ORDER — ACETAMINOPHEN 650 MG/1
650 SUPPOSITORY RECTAL EVERY 4 HOURS PRN
Status: DISCONTINUED | OUTPATIENT
Start: 2025-08-27 | End: 2025-08-27

## 2025-08-27 RX ORDER — SENNOSIDES 8.6 MG/1
2 TABLET ORAL 2 TIMES DAILY
Status: DISCONTINUED | OUTPATIENT
Start: 2025-08-27 | End: 2025-08-31 | Stop reason: HOSPADM

## 2025-08-27 RX ORDER — ASPIRIN 81 MG/1
81 TABLET ORAL DAILY
Status: DISCONTINUED | OUTPATIENT
Start: 2025-08-27 | End: 2025-08-31 | Stop reason: HOSPADM

## 2025-08-27 RX ORDER — ENOXAPARIN SODIUM 100 MG/ML
30 INJECTION SUBCUTANEOUS EVERY 24 HOURS
Status: DISCONTINUED | OUTPATIENT
Start: 2025-08-27 | End: 2025-08-27

## 2025-08-27 RX ORDER — ONDANSETRON HYDROCHLORIDE 2 MG/ML
4 INJECTION, SOLUTION INTRAVENOUS EVERY 8 HOURS PRN
Status: DISCONTINUED | OUTPATIENT
Start: 2025-08-27 | End: 2025-08-31 | Stop reason: HOSPADM

## 2025-08-27 RX ORDER — DEXTROSE 50 % IN WATER (D50W) INTRAVENOUS SYRINGE
12.5
Status: DISCONTINUED | OUTPATIENT
Start: 2025-08-27 | End: 2025-08-31 | Stop reason: HOSPADM

## 2025-08-27 RX ORDER — HEPARIN SODIUM 5000 [USP'U]/ML
5000 INJECTION, SOLUTION INTRAVENOUS; SUBCUTANEOUS EVERY 8 HOURS SCHEDULED
Status: DISCONTINUED | OUTPATIENT
Start: 2025-08-27 | End: 2025-08-31 | Stop reason: HOSPADM

## 2025-08-27 RX ORDER — AMLODIPINE BESYLATE 5 MG/1
5 TABLET ORAL DAILY
Status: DISCONTINUED | OUTPATIENT
Start: 2025-08-27 | End: 2025-08-31 | Stop reason: HOSPADM

## 2025-08-27 RX ORDER — INSULIN LISPRO 100 [IU]/ML
0-10 INJECTION, SOLUTION INTRAVENOUS; SUBCUTANEOUS
Status: DISCONTINUED | OUTPATIENT
Start: 2025-08-27 | End: 2025-08-31 | Stop reason: HOSPADM

## 2025-08-27 RX ORDER — HYDRALAZINE HYDROCHLORIDE 20 MG/ML
10 INJECTION INTRAMUSCULAR; INTRAVENOUS EVERY 6 HOURS PRN
Status: DISCONTINUED | OUTPATIENT
Start: 2025-08-27 | End: 2025-08-31 | Stop reason: HOSPADM

## 2025-08-27 RX ADMIN — INSULIN LISPRO 4 UNITS: 100 INJECTION, SOLUTION INTRAVENOUS; SUBCUTANEOUS at 13:17

## 2025-08-27 RX ADMIN — INSULIN LISPRO 6 UNITS: 100 INJECTION, SOLUTION INTRAVENOUS; SUBCUTANEOUS at 03:30

## 2025-08-27 RX ADMIN — ASPIRIN 81 MG: 81 TABLET, DELAYED RELEASE ORAL at 12:24

## 2025-08-27 RX ADMIN — INSULIN LISPRO 6 UNITS: 100 INJECTION, SOLUTION INTRAVENOUS; SUBCUTANEOUS at 23:21

## 2025-08-27 RX ADMIN — INSULIN LISPRO 10 UNITS: 100 INJECTION, SOLUTION INTRAVENOUS; SUBCUTANEOUS at 19:31

## 2025-08-27 RX ADMIN — LISINOPRIL 40 MG: 20 TABLET ORAL at 12:24

## 2025-08-27 RX ADMIN — DEXTROSE MONOHYDRATE 50 ML/HR: 50 INJECTION, SOLUTION INTRAVENOUS at 06:28

## 2025-08-27 RX ADMIN — LATANOPROST 1 DROP: 50 SOLUTION/ DROPS OPHTHALMIC at 23:20

## 2025-08-27 RX ADMIN — SENNOSIDES 17.2 MG: 8.6 TABLET, FILM COATED ORAL at 12:24

## 2025-08-27 RX ADMIN — SODIUM CHLORIDE 75 ML/HR: 0.45 INJECTION, SOLUTION INTRAVENOUS at 05:10

## 2025-08-27 RX ADMIN — INSULIN LISPRO 6 UNITS: 100 INJECTION, SOLUTION INTRAVENOUS; SUBCUTANEOUS at 06:57

## 2025-08-27 RX ADMIN — AMLODIPINE BESYLATE 5 MG: 5 TABLET ORAL at 12:24

## 2025-08-27 RX ADMIN — HEPARIN SODIUM 5000 UNITS: 5000 INJECTION, SOLUTION INTRAVENOUS; SUBCUTANEOUS at 22:26

## 2025-08-27 RX ADMIN — HEPARIN SODIUM 5000 UNITS: 5000 INJECTION, SOLUTION INTRAVENOUS; SUBCUTANEOUS at 13:18

## 2025-08-27 RX ADMIN — METOPROLOL SUCCINATE 100 MG: 50 TABLET, EXTENDED RELEASE ORAL at 12:24

## 2025-08-27 RX ADMIN — SENNOSIDES 17.2 MG: 8.6 TABLET, FILM COATED ORAL at 22:26

## 2025-08-27 SDOH — SOCIAL STABILITY: SOCIAL INSECURITY: ARE THERE ANY APPARENT SIGNS OF INJURIES/BEHAVIORS THAT COULD BE RELATED TO ABUSE/NEGLECT?: UNABLE TO ASSESS

## 2025-08-27 SDOH — SOCIAL STABILITY: SOCIAL INSECURITY: DO YOU FEEL ANYONE HAS EXPLOITED OR TAKEN ADVANTAGE OF YOU FINANCIALLY OR OF YOUR PERSONAL PROPERTY?: UNABLE TO ASSESS

## 2025-08-27 SDOH — SOCIAL STABILITY: SOCIAL INSECURITY: ARE YOU OR HAVE YOU BEEN THREATENED OR ABUSED PHYSICALLY, EMOTIONALLY, OR SEXUALLY BY ANYONE?: UNABLE TO ASSESS

## 2025-08-27 SDOH — SOCIAL STABILITY: SOCIAL INSECURITY: DOES ANYONE TRY TO KEEP YOU FROM HAVING/CONTACTING OTHER FRIENDS OR DOING THINGS OUTSIDE YOUR HOME?: UNABLE TO ASSESS

## 2025-08-27 SDOH — SOCIAL STABILITY: SOCIAL INSECURITY: ABUSE: ADULT

## 2025-08-27 SDOH — SOCIAL STABILITY: SOCIAL INSECURITY: WITHIN THE LAST YEAR, HAVE YOU BEEN AFRAID OF YOUR PARTNER OR EX-PARTNER?: PATIENT UNABLE TO ANSWER

## 2025-08-27 SDOH — SOCIAL STABILITY: SOCIAL INSECURITY: DO YOU FEEL UNSAFE GOING BACK TO THE PLACE WHERE YOU ARE LIVING?: UNABLE TO ASSESS

## 2025-08-27 SDOH — SOCIAL STABILITY: SOCIAL INSECURITY: HAS ANYONE EVER THREATENED TO HURT YOUR FAMILY OR YOUR PETS?: UNABLE TO ASSESS

## 2025-08-27 SDOH — SOCIAL STABILITY: SOCIAL INSECURITY: HAVE YOU HAD THOUGHTS OF HARMING ANYONE ELSE?: UNABLE TO ASSESS

## 2025-08-27 SDOH — SOCIAL STABILITY: SOCIAL INSECURITY: HAVE YOU HAD ANY THOUGHTS OF HARMING ANYONE ELSE?: UNABLE TO ASSESS

## 2025-08-27 SDOH — SOCIAL STABILITY: SOCIAL INSECURITY: WERE YOU ABLE TO COMPLETE ALL THE BEHAVIORAL HEALTH SCREENINGS?: NO

## 2025-08-27 ASSESSMENT — ACTIVITIES OF DAILY LIVING (ADL)
HEARING - RIGHT EAR: FUNCTIONAL
WALKS IN HOME: NEEDS ASSISTANCE
PATIENT'S MEMORY ADEQUATE TO SAFELY COMPLETE DAILY ACTIVITIES?: NO
TOILETING: NEEDS ASSISTANCE
LACK_OF_TRANSPORTATION: PATIENT UNABLE TO ANSWER
FEEDING YOURSELF: INDEPENDENT
ADEQUATE_TO_COMPLETE_ADL: NO
GROOMING: UNABLE TO ASSESS
JUDGMENT_ADEQUATE_SAFELY_COMPLETE_DAILY_ACTIVITIES: NO
ASSISTIVE_DEVICE: OTHER (COMMENT);NONE
DRESSING YOURSELF: UNABLE TO ASSESS
HEARING - LEFT EAR: FUNCTIONAL
LACK_OF_TRANSPORTATION: PATIENT UNABLE TO ANSWER
BATHING: UNABLE TO ASSESS

## 2025-08-27 ASSESSMENT — LIFESTYLE VARIABLES
HOW OFTEN DO YOU HAVE 6 OR MORE DRINKS ON ONE OCCASION: NEVER
SKIP TO QUESTIONS 9-10: 1
AUDIT-C TOTAL SCORE: 1
HOW OFTEN DO YOU HAVE A DRINK CONTAINING ALCOHOL: MONTHLY OR LESS
HOW MANY STANDARD DRINKS CONTAINING ALCOHOL DO YOU HAVE ON A TYPICAL DAY: 1 OR 2
AUDIT-C TOTAL SCORE: 1

## 2025-08-27 ASSESSMENT — ENCOUNTER SYMPTOMS
COUGH: 1
SHORTNESS OF BREATH: 1

## 2025-08-27 ASSESSMENT — COGNITIVE AND FUNCTIONAL STATUS - GENERAL
PERSONAL GROOMING: A LITTLE
WALKING IN HOSPITAL ROOM: A LITTLE
MOBILITY SCORE: 22
TOILETING: A LOT
DAILY ACTIVITIY SCORE: 20
CLIMB 3 TO 5 STEPS WITH RAILING: A LITTLE
PATIENT BASELINE BEDBOUND: NO
HELP NEEDED FOR BATHING: A LITTLE

## 2025-08-27 ASSESSMENT — PAIN SCALES - GENERAL
PAINLEVEL_OUTOF10: 0 - NO PAIN
PAINLEVEL_OUTOF10: 0 - NO PAIN

## 2025-08-27 ASSESSMENT — PAIN SCALES - PAIN ASSESSMENT IN ADVANCED DEMENTIA (PAINAD)
CONSOLABILITY: NO NEED TO CONSOLE
BREATHING: NORMAL
FACIALEXPRESSION: SMILING OR INEXPRESSIVE
BODYLANGUAGE: RELAXED
TOTALSCORE: 0
TOTALSCORE: REPOSITIONED;REST

## 2025-08-27 ASSESSMENT — PAIN - FUNCTIONAL ASSESSMENT
PAIN_FUNCTIONAL_ASSESSMENT: 0-10
PAIN_FUNCTIONAL_ASSESSMENT: 0-10

## 2025-08-27 ASSESSMENT — PATIENT HEALTH QUESTIONNAIRE - PHQ9
1. LITTLE INTEREST OR PLEASURE IN DOING THINGS: NOT AT ALL
2. FEELING DOWN, DEPRESSED OR HOPELESS: NOT AT ALL
SUM OF ALL RESPONSES TO PHQ9 QUESTIONS 1 & 2: 0

## 2025-08-28 LAB
25(OH)D3 SERPL-MCNC: 75 NG/ML (ref 30–100)
ALBUMIN SERPL BCP-MCNC: 3.6 G/DL (ref 3.4–5)
ALP SERPL-CCNC: 113 U/L (ref 33–136)
ALT SERPL W P-5'-P-CCNC: 109 U/L (ref 7–45)
ANION GAP SERPL CALC-SCNC: 11 MMOL/L (ref 10–20)
ANION GAP SERPL CALC-SCNC: 11 MMOL/L (ref 10–20)
ANION GAP SERPL CALC-SCNC: 8 MMOL/L (ref 10–20)
AST SERPL W P-5'-P-CCNC: 75 U/L (ref 9–39)
BASOPHILS # BLD AUTO: 0.04 X10*3/UL (ref 0–0.1)
BASOPHILS NFR BLD AUTO: 0.4 %
BILIRUB SERPL-MCNC: 1.7 MG/DL (ref 0–1.2)
BUN SERPL-MCNC: 43 MG/DL (ref 6–23)
CALCIUM SERPL-MCNC: 10.4 MG/DL (ref 8.6–10.3)
CALCIUM SERPL-MCNC: 10.5 MG/DL (ref 8.6–10.3)
CALCIUM SERPL-MCNC: 10.5 MG/DL (ref 8.6–10.3)
CHLORIDE SERPL-SCNC: 112 MMOL/L (ref 98–107)
CHLORIDE SERPL-SCNC: 112 MMOL/L (ref 98–107)
CHLORIDE SERPL-SCNC: 114 MMOL/L (ref 98–107)
CO2 SERPL-SCNC: 27 MMOL/L (ref 21–32)
CO2 SERPL-SCNC: 27 MMOL/L (ref 21–32)
CO2 SERPL-SCNC: 29 MMOL/L (ref 21–32)
CREAT SERPL-MCNC: 1.67 MG/DL (ref 0.5–1.05)
CREAT SERPL-MCNC: 1.67 MG/DL (ref 0.5–1.05)
CREAT SERPL-MCNC: 1.71 MG/DL (ref 0.5–1.05)
EGFRCR SERPLBLD CKD-EPI 2021: 31 ML/MIN/1.73M*2
EGFRCR SERPLBLD CKD-EPI 2021: 32 ML/MIN/1.73M*2
EGFRCR SERPLBLD CKD-EPI 2021: 32 ML/MIN/1.73M*2
EOSINOPHIL # BLD AUTO: 0.19 X10*3/UL (ref 0–0.4)
EOSINOPHIL NFR BLD AUTO: 1.9 %
ERYTHROCYTE [DISTWIDTH] IN BLOOD BY AUTOMATED COUNT: 12.9 % (ref 11.5–14.5)
EST. AVERAGE GLUCOSE BLD GHB EST-MCNC: 332 MG/DL
FLUAV RNA RESP QL NAA+PROBE: NOT DETECTED
FLUBV RNA RESP QL NAA+PROBE: NOT DETECTED
GLUCOSE BLD MANUAL STRIP-MCNC: 146 MG/DL (ref 74–99)
GLUCOSE BLD MANUAL STRIP-MCNC: 154 MG/DL (ref 74–99)
GLUCOSE BLD MANUAL STRIP-MCNC: 206 MG/DL (ref 74–99)
GLUCOSE BLD MANUAL STRIP-MCNC: 318 MG/DL (ref 74–99)
GLUCOSE BLD MANUAL STRIP-MCNC: 370 MG/DL (ref 74–99)
GLUCOSE BLD MANUAL STRIP-MCNC: 399 MG/DL (ref 74–99)
GLUCOSE SERPL-MCNC: 129 MG/DL (ref 74–99)
GLUCOSE SERPL-MCNC: 129 MG/DL (ref 74–99)
GLUCOSE SERPL-MCNC: 147 MG/DL (ref 74–99)
HADV DNA SPEC QL NAA+PROBE: NOT DETECTED
HBA1C MFR BLD: 13.2 % (ref ?–5.7)
HCT VFR BLD AUTO: 53.4 % (ref 36–46)
HGB BLD-MCNC: 17.2 G/DL (ref 12–16)
HMPV RNA SPEC QL NAA+PROBE: NOT DETECTED
HPIV1 RNA SPEC QL NAA+PROBE: NOT DETECTED
HPIV2 RNA SPEC QL NAA+PROBE: NOT DETECTED
HPIV3 RNA SPEC QL NAA+PROBE: NOT DETECTED
HPIV4 RNA SPEC QL NAA+PROBE: NOT DETECTED
IMM GRANULOCYTES # BLD AUTO: 0.05 X10*3/UL (ref 0–0.5)
IMM GRANULOCYTES NFR BLD AUTO: 0.5 % (ref 0–0.9)
KAPPA LC SERPL-MCNC: 5.01 MG/DL (ref 0.33–1.94)
KAPPA LC/LAMBDA SER: 1.22 {RATIO} (ref 0.26–1.65)
LAMBDA LC SERPL-MCNC: 4.1 MG/DL (ref 0.57–2.63)
LYMPHOCYTES # BLD AUTO: 1.95 X10*3/UL (ref 0.8–3)
LYMPHOCYTES NFR BLD AUTO: 19.6 %
MAGNESIUM SERPL-MCNC: 1.87 MG/DL (ref 1.6–2.4)
MCH RBC QN AUTO: 29.9 PG (ref 26–34)
MCHC RBC AUTO-ENTMCNC: 32.2 G/DL (ref 32–36)
MCV RBC AUTO: 93 FL (ref 80–100)
MONOCYTES # BLD AUTO: 0.63 X10*3/UL (ref 0.05–0.8)
MONOCYTES NFR BLD AUTO: 6.3 %
NEUTROPHILS # BLD AUTO: 7.09 X10*3/UL (ref 1.6–5.5)
NEUTROPHILS NFR BLD AUTO: 71.3 %
NRBC BLD-RTO: 0 /100 WBCS (ref 0–0)
PLATELET # BLD AUTO: 235 X10*3/UL (ref 150–450)
POTASSIUM SERPL-SCNC: 3.3 MMOL/L (ref 3.5–5.3)
POTASSIUM SERPL-SCNC: 3.3 MMOL/L (ref 3.5–5.3)
POTASSIUM SERPL-SCNC: 3.4 MMOL/L (ref 3.5–5.3)
PROT SERPL-MCNC: 6.8 G/DL (ref 6.4–8.2)
PROT SERPL-MCNC: 6.9 G/DL (ref 6.4–8.2)
RBC # BLD AUTO: 5.75 X10*6/UL (ref 4–5.2)
RHINOVIRUS RNA UPPER RESP QL NAA+PROBE: NOT DETECTED
RSV RNA RESP QL NAA+PROBE: NOT DETECTED
SARS-COV-2 RNA RESP QL NAA+PROBE: NOT DETECTED
SODIUM SERPL-SCNC: 147 MMOL/L (ref 136–145)
SODIUM SERPL-SCNC: 147 MMOL/L (ref 136–145)
SODIUM SERPL-SCNC: 148 MMOL/L (ref 136–145)
T4 FREE SERPL-MCNC: 1.16 NG/DL (ref 0.78–1.48)
WBC # BLD AUTO: 10 X10*3/UL (ref 4.4–11.3)

## 2025-08-28 PROCEDURE — 2500000002 HC RX 250 W HCPCS SELF ADMINISTERED DRUGS (ALT 637 FOR MEDICARE OP, ALT 636 FOR OP/ED): Performed by: INTERNAL MEDICINE

## 2025-08-28 PROCEDURE — 87631 RESP VIRUS 3-5 TARGETS: CPT | Mod: AHULAB | Performed by: HOSPITALIST

## 2025-08-28 PROCEDURE — 2500000001 HC RX 250 WO HCPCS SELF ADMINISTERED DRUGS (ALT 637 FOR MEDICARE OP): Performed by: HOSPITALIST

## 2025-08-28 PROCEDURE — 2500000001 HC RX 250 WO HCPCS SELF ADMINISTERED DRUGS (ALT 637 FOR MEDICARE OP): Performed by: INTERNAL MEDICINE

## 2025-08-28 PROCEDURE — 83036 HEMOGLOBIN GLYCOSYLATED A1C: CPT | Mod: AHULAB | Performed by: HOSPITALIST

## 2025-08-28 PROCEDURE — 80074 ACUTE HEPATITIS PANEL: CPT | Mod: AHULAB | Performed by: HOSPITALIST

## 2025-08-28 PROCEDURE — 82947 ASSAY GLUCOSE BLOOD QUANT: CPT

## 2025-08-28 PROCEDURE — 82397 CHEMILUMINESCENT ASSAY: CPT | Performed by: INTERNAL MEDICINE

## 2025-08-28 PROCEDURE — 80048 BASIC METABOLIC PNL TOTAL CA: CPT | Performed by: INTERNAL MEDICINE

## 2025-08-28 PROCEDURE — 83735 ASSAY OF MAGNESIUM: CPT | Performed by: INTERNAL MEDICINE

## 2025-08-28 PROCEDURE — 83521 IG LIGHT CHAINS FREE EACH: CPT | Mod: AHULAB | Performed by: INTERNAL MEDICINE

## 2025-08-28 PROCEDURE — 97165 OT EVAL LOW COMPLEX 30 MIN: CPT | Mod: GO | Performed by: OCCUPATIONAL THERAPIST

## 2025-08-28 PROCEDURE — 83970 ASSAY OF PARATHORMONE: CPT | Mod: AHULAB | Performed by: INTERNAL MEDICINE

## 2025-08-28 PROCEDURE — 2060000001 HC INTERMEDIATE ICU ROOM DAILY

## 2025-08-28 PROCEDURE — 82652 VIT D 1 25-DIHYDROXY: CPT | Performed by: INTERNAL MEDICINE

## 2025-08-28 PROCEDURE — 36415 COLL VENOUS BLD VENIPUNCTURE: CPT | Performed by: HOSPITALIST

## 2025-08-28 PROCEDURE — 2500000004 HC RX 250 GENERAL PHARMACY W/ HCPCS (ALT 636 FOR OP/ED): Performed by: INTERNAL MEDICINE

## 2025-08-28 PROCEDURE — 80053 COMPREHEN METABOLIC PANEL: CPT | Performed by: HOSPITALIST

## 2025-08-28 PROCEDURE — 85025 COMPLETE CBC W/AUTO DIFF WBC: CPT | Performed by: HOSPITALIST

## 2025-08-28 PROCEDURE — 87637 SARSCOV2&INF A&B&RSV AMP PRB: CPT | Performed by: HOSPITALIST

## 2025-08-28 PROCEDURE — 84439 ASSAY OF FREE THYROXINE: CPT | Mod: AHULAB | Performed by: INTERNAL MEDICINE

## 2025-08-28 PROCEDURE — 82306 VITAMIN D 25 HYDROXY: CPT | Mod: AHULAB | Performed by: INTERNAL MEDICINE

## 2025-08-28 PROCEDURE — 2500000004 HC RX 250 GENERAL PHARMACY W/ HCPCS (ALT 636 FOR OP/ED): Performed by: HOSPITALIST

## 2025-08-28 PROCEDURE — 99233 SBSQ HOSP IP/OBS HIGH 50: CPT | Performed by: INTERNAL MEDICINE

## 2025-08-28 PROCEDURE — 84155 ASSAY OF PROTEIN SERUM: CPT | Mod: AHULAB | Performed by: INTERNAL MEDICINE

## 2025-08-28 PROCEDURE — 97161 PT EVAL LOW COMPLEX 20 MIN: CPT | Mod: GP

## 2025-08-28 PROCEDURE — 2500000002 HC RX 250 W HCPCS SELF ADMINISTERED DRUGS (ALT 637 FOR MEDICARE OP, ALT 636 FOR OP/ED): Performed by: HOSPITALIST

## 2025-08-28 RX ORDER — DEXTROSE MONOHYDRATE 50 MG/ML
70 INJECTION, SOLUTION INTRAVENOUS CONTINUOUS
Status: ACTIVE | OUTPATIENT
Start: 2025-08-28 | End: 2025-08-28

## 2025-08-28 RX ORDER — INSULIN GLARGINE 100 [IU]/ML
10 INJECTION, SOLUTION SUBCUTANEOUS EVERY EVENING
Status: DISCONTINUED | OUTPATIENT
Start: 2025-08-28 | End: 2025-08-30

## 2025-08-28 RX ORDER — POTASSIUM CHLORIDE 1.5 G/1.58G
20 POWDER, FOR SOLUTION ORAL 2 TIMES DAILY
Status: COMPLETED | OUTPATIENT
Start: 2025-08-28 | End: 2025-08-28

## 2025-08-28 RX ORDER — CINACALCET 30 MG/1
30 TABLET, FILM COATED ORAL
Status: DISCONTINUED | OUTPATIENT
Start: 2025-08-28 | End: 2025-08-31 | Stop reason: HOSPADM

## 2025-08-28 RX ADMIN — DEXTROSE MONOHYDRATE 70 ML/HR: 50 INJECTION, SOLUTION INTRAVENOUS at 15:18

## 2025-08-28 RX ADMIN — HEPARIN SODIUM 5000 UNITS: 5000 INJECTION, SOLUTION INTRAVENOUS; SUBCUTANEOUS at 06:01

## 2025-08-28 RX ADMIN — AMLODIPINE BESYLATE 5 MG: 5 TABLET ORAL at 09:17

## 2025-08-28 RX ADMIN — CINACALCET HYDROCHLORIDE 30 MG: 30 TABLET, FILM COATED ORAL at 09:17

## 2025-08-28 RX ADMIN — SENNOSIDES 17.2 MG: 8.6 TABLET, FILM COATED ORAL at 09:17

## 2025-08-28 RX ADMIN — HEPARIN SODIUM 5000 UNITS: 5000 INJECTION, SOLUTION INTRAVENOUS; SUBCUTANEOUS at 15:18

## 2025-08-28 RX ADMIN — POTASSIUM CHLORIDE 20 MEQ: 1.5 POWDER, FOR SOLUTION ORAL at 21:23

## 2025-08-28 RX ADMIN — INSULIN LISPRO 4 UNITS: 100 INJECTION, SOLUTION INTRAVENOUS; SUBCUTANEOUS at 09:16

## 2025-08-28 RX ADMIN — HEPARIN SODIUM 5000 UNITS: 5000 INJECTION, SOLUTION INTRAVENOUS; SUBCUTANEOUS at 21:23

## 2025-08-28 RX ADMIN — INSULIN GLARGINE 10 UNITS: 100 INJECTION, SOLUTION SUBCUTANEOUS at 21:22

## 2025-08-28 RX ADMIN — LISINOPRIL 40 MG: 20 TABLET ORAL at 09:17

## 2025-08-28 RX ADMIN — POTASSIUM CHLORIDE 20 MEQ: 1.5 POWDER, FOR SOLUTION ORAL at 09:16

## 2025-08-28 RX ADMIN — ASPIRIN 81 MG: 81 TABLET, DELAYED RELEASE ORAL at 09:17

## 2025-08-28 RX ADMIN — LATANOPROST 1 DROP: 50 SOLUTION/ DROPS OPHTHALMIC at 21:23

## 2025-08-28 RX ADMIN — INSULIN LISPRO 10 UNITS: 100 INJECTION, SOLUTION INTRAVENOUS; SUBCUTANEOUS at 12:07

## 2025-08-28 RX ADMIN — SENNOSIDES 17.2 MG: 8.6 TABLET, FILM COATED ORAL at 21:23

## 2025-08-28 RX ADMIN — METOPROLOL SUCCINATE 100 MG: 50 TABLET, EXTENDED RELEASE ORAL at 09:17

## 2025-08-28 RX ADMIN — INSULIN LISPRO 8 UNITS: 100 INJECTION, SOLUTION INTRAVENOUS; SUBCUTANEOUS at 16:49

## 2025-08-28 ASSESSMENT — PAIN SCALES - PAIN ASSESSMENT IN ADVANCED DEMENTIA (PAINAD)
FACIALEXPRESSION: SMILING OR INEXPRESSIVE
CONSOLABILITY: NO NEED TO CONSOLE
TOTALSCORE: 0
TOTALSCORE: REST;REPOSITIONED
CONSOLABILITY: NO NEED TO CONSOLE
FACIALEXPRESSION: SMILING OR INEXPRESSIVE
BREATHING: NORMAL
BREATHING: NORMAL
BODYLANGUAGE: RELAXED
TOTALSCORE: REPOSITIONED;REST
BREATHING: NORMAL
FACIALEXPRESSION: SMILING OR INEXPRESSIVE
TOTALSCORE: REPOSITIONED;REST
CONSOLABILITY: NO NEED TO CONSOLE
BODYLANGUAGE: RELAXED
TOTALSCORE: 0
BREATHING: NORMAL
TOTALSCORE: 0
CONSOLABILITY: NO NEED TO CONSOLE
FACIALEXPRESSION: SMILING OR INEXPRESSIVE
BODYLANGUAGE: RELAXED
TOTALSCORE: 0
BODYLANGUAGE: RELAXED

## 2025-08-28 ASSESSMENT — COGNITIVE AND FUNCTIONAL STATUS - GENERAL
PERSONAL GROOMING: A LITTLE
MOVING FROM LYING ON BACK TO SITTING ON SIDE OF FLAT BED WITH BEDRAILS: A LITTLE
MOVING TO AND FROM BED TO CHAIR: A LITTLE
STANDING UP FROM CHAIR USING ARMS: A LITTLE
TOILETING: A LOT
DRESSING REGULAR UPPER BODY CLOTHING: A LOT
HELP NEEDED FOR BATHING: A LOT
TURNING FROM BACK TO SIDE WHILE IN FLAT BAD: A LITTLE
EATING MEALS: A LITTLE
DAILY ACTIVITIY SCORE: 14
WALKING IN HOSPITAL ROOM: A LITTLE
DRESSING REGULAR LOWER BODY CLOTHING: A LOT
CLIMB 3 TO 5 STEPS WITH RAILING: A LOT
MOBILITY SCORE: 17

## 2025-08-28 ASSESSMENT — PAIN SCALES - GENERAL
PAINLEVEL_OUTOF10: 0 - NO PAIN

## 2025-08-28 ASSESSMENT — PAIN - FUNCTIONAL ASSESSMENT
PAIN_FUNCTIONAL_ASSESSMENT: 0-10
PAIN_FUNCTIONAL_ASSESSMENT: PAINAD (PAIN ASSESSMENT IN ADVANCED DEMENTIA SCALE)
PAIN_FUNCTIONAL_ASSESSMENT: 0-10

## 2025-08-28 ASSESSMENT — ACTIVITIES OF DAILY LIVING (ADL): ADL_ASSISTANCE: INDEPENDENT

## 2025-08-29 LAB
ALBUMIN SERPL BCP-MCNC: 3.3 G/DL (ref 3.4–5)
ALP SERPL-CCNC: 105 U/L (ref 33–136)
ALT SERPL W P-5'-P-CCNC: 77 U/L (ref 7–45)
ANION GAP SERPL CALC-SCNC: 13 MMOL/L (ref 10–20)
AST SERPL W P-5'-P-CCNC: 39 U/L (ref 9–39)
BILIRUB SERPL-MCNC: 1.6 MG/DL (ref 0–1.2)
BUN SERPL-MCNC: 43 MG/DL (ref 6–23)
CALCIUM SERPL-MCNC: 9.7 MG/DL (ref 8.6–10.3)
CHLORIDE SERPL-SCNC: 108 MMOL/L (ref 98–107)
CO2 SERPL-SCNC: 23 MMOL/L (ref 21–32)
CREAT SERPL-MCNC: 1.56 MG/DL (ref 0.5–1.05)
EGFRCR SERPLBLD CKD-EPI 2021: 34 ML/MIN/1.73M*2
ERYTHROCYTE [DISTWIDTH] IN BLOOD BY AUTOMATED COUNT: 13.1 % (ref 11.5–14.5)
GLUCOSE BLD MANUAL STRIP-MCNC: 280 MG/DL (ref 74–99)
GLUCOSE BLD MANUAL STRIP-MCNC: 306 MG/DL (ref 74–99)
GLUCOSE BLD MANUAL STRIP-MCNC: 320 MG/DL (ref 74–99)
GLUCOSE BLD MANUAL STRIP-MCNC: 372 MG/DL (ref 74–99)
GLUCOSE SERPL-MCNC: 313 MG/DL (ref 74–99)
HAV IGM SER QL: NONREACTIVE
HBV CORE IGM SER QL: NONREACTIVE
HBV SURFACE AG SERPL QL IA: NONREACTIVE
HCT VFR BLD AUTO: 47.6 % (ref 36–46)
HCV AB SER QL: NONREACTIVE
HGB BLD-MCNC: 16 G/DL (ref 12–16)
MAGNESIUM SERPL-MCNC: 1.62 MG/DL (ref 1.6–2.4)
MCH RBC QN AUTO: 29.9 PG (ref 26–34)
MCHC RBC AUTO-ENTMCNC: 33.6 G/DL (ref 32–36)
MCV RBC AUTO: 89 FL (ref 80–100)
NRBC BLD-RTO: 0 /100 WBCS (ref 0–0)
PLATELET # BLD AUTO: 189 X10*3/UL (ref 150–450)
POTASSIUM SERPL-SCNC: 3.6 MMOL/L (ref 3.5–5.3)
PROT SERPL-MCNC: 6.3 G/DL (ref 6.4–8.2)
PTH-INTACT SERPL-MCNC: 106.5 PG/ML (ref 18.5–88)
RBC # BLD AUTO: 5.35 X10*6/UL (ref 4–5.2)
SODIUM SERPL-SCNC: 140 MMOL/L (ref 136–145)
WBC # BLD AUTO: 10.5 X10*3/UL (ref 4.4–11.3)

## 2025-08-29 PROCEDURE — 99232 SBSQ HOSP IP/OBS MODERATE 35: CPT | Performed by: INTERNAL MEDICINE

## 2025-08-29 PROCEDURE — 2060000001 HC INTERMEDIATE ICU ROOM DAILY

## 2025-08-29 PROCEDURE — 82947 ASSAY GLUCOSE BLOOD QUANT: CPT

## 2025-08-29 PROCEDURE — 80053 COMPREHEN METABOLIC PANEL: CPT | Performed by: INTERNAL MEDICINE

## 2025-08-29 PROCEDURE — 2500000001 HC RX 250 WO HCPCS SELF ADMINISTERED DRUGS (ALT 637 FOR MEDICARE OP): Performed by: HOSPITALIST

## 2025-08-29 PROCEDURE — 97530 THERAPEUTIC ACTIVITIES: CPT | Mod: GO | Performed by: OCCUPATIONAL THERAPIST

## 2025-08-29 PROCEDURE — 99221 1ST HOSP IP/OBS SF/LOW 40: CPT | Performed by: INTERNAL MEDICINE

## 2025-08-29 PROCEDURE — 2500000004 HC RX 250 GENERAL PHARMACY W/ HCPCS (ALT 636 FOR OP/ED): Performed by: HOSPITALIST

## 2025-08-29 PROCEDURE — 83735 ASSAY OF MAGNESIUM: CPT | Performed by: INTERNAL MEDICINE

## 2025-08-29 PROCEDURE — 2500000002 HC RX 250 W HCPCS SELF ADMINISTERED DRUGS (ALT 637 FOR MEDICARE OP, ALT 636 FOR OP/ED): Performed by: HOSPITALIST

## 2025-08-29 PROCEDURE — 36415 COLL VENOUS BLD VENIPUNCTURE: CPT | Performed by: INTERNAL MEDICINE

## 2025-08-29 PROCEDURE — 97535 SELF CARE MNGMENT TRAINING: CPT | Mod: GO | Performed by: OCCUPATIONAL THERAPIST

## 2025-08-29 PROCEDURE — 97530 THERAPEUTIC ACTIVITIES: CPT | Mod: GP,CQ

## 2025-08-29 PROCEDURE — 2500000002 HC RX 250 W HCPCS SELF ADMINISTERED DRUGS (ALT 637 FOR MEDICARE OP, ALT 636 FOR OP/ED): Performed by: INTERNAL MEDICINE

## 2025-08-29 PROCEDURE — 85027 COMPLETE CBC AUTOMATED: CPT | Performed by: INTERNAL MEDICINE

## 2025-08-29 RX ADMIN — METOPROLOL SUCCINATE 100 MG: 50 TABLET, EXTENDED RELEASE ORAL at 08:18

## 2025-08-29 RX ADMIN — LATANOPROST 1 DROP: 50 SOLUTION/ DROPS OPHTHALMIC at 20:12

## 2025-08-29 RX ADMIN — INSULIN GLARGINE 10 UNITS: 100 INJECTION, SOLUTION SUBCUTANEOUS at 20:12

## 2025-08-29 RX ADMIN — HEPARIN SODIUM 5000 UNITS: 5000 INJECTION, SOLUTION INTRAVENOUS; SUBCUTANEOUS at 20:12

## 2025-08-29 RX ADMIN — SENNOSIDES 17.2 MG: 8.6 TABLET, FILM COATED ORAL at 08:18

## 2025-08-29 RX ADMIN — HEPARIN SODIUM 5000 UNITS: 5000 INJECTION, SOLUTION INTRAVENOUS; SUBCUTANEOUS at 05:41

## 2025-08-29 RX ADMIN — HEPARIN SODIUM 5000 UNITS: 5000 INJECTION, SOLUTION INTRAVENOUS; SUBCUTANEOUS at 14:23

## 2025-08-29 RX ADMIN — INSULIN LISPRO 10 UNITS: 100 INJECTION, SOLUTION INTRAVENOUS; SUBCUTANEOUS at 12:51

## 2025-08-29 RX ADMIN — SENNOSIDES 17.2 MG: 8.6 TABLET, FILM COATED ORAL at 20:12

## 2025-08-29 RX ADMIN — INSULIN LISPRO 6 UNITS: 100 INJECTION, SOLUTION INTRAVENOUS; SUBCUTANEOUS at 08:18

## 2025-08-29 RX ADMIN — AMLODIPINE BESYLATE 5 MG: 5 TABLET ORAL at 08:18

## 2025-08-29 RX ADMIN — INSULIN LISPRO 8 UNITS: 100 INJECTION, SOLUTION INTRAVENOUS; SUBCUTANEOUS at 16:40

## 2025-08-29 RX ADMIN — LISINOPRIL 40 MG: 20 TABLET ORAL at 08:18

## 2025-08-29 RX ADMIN — CINACALCET HYDROCHLORIDE 30 MG: 30 TABLET, FILM COATED ORAL at 08:18

## 2025-08-29 RX ADMIN — ASPIRIN 81 MG: 81 TABLET, DELAYED RELEASE ORAL at 08:18

## 2025-08-29 ASSESSMENT — COGNITIVE AND FUNCTIONAL STATUS - GENERAL
WALKING IN HOSPITAL ROOM: A LOT
PERSONAL GROOMING: A LITTLE
DAILY ACTIVITIY SCORE: 13
DRESSING REGULAR LOWER BODY CLOTHING: A LOT
STANDING UP FROM CHAIR USING ARMS: A LOT
HELP NEEDED FOR BATHING: A LOT
TOILETING: TOTAL
MOBILITY SCORE: 12
DRESSING REGULAR UPPER BODY CLOTHING: A LOT
MOVING TO AND FROM BED TO CHAIR: A LOT
EATING MEALS: A LITTLE
MOVING FROM LYING ON BACK TO SITTING ON SIDE OF FLAT BED WITH BEDRAILS: A LITTLE
TURNING FROM BACK TO SIDE WHILE IN FLAT BAD: A LOT
CLIMB 3 TO 5 STEPS WITH RAILING: TOTAL

## 2025-08-29 ASSESSMENT — PAIN SCALES - PAIN ASSESSMENT IN ADVANCED DEMENTIA (PAINAD)
TOTALSCORE: 0
CONSOLABILITY: NO NEED TO CONSOLE
TOTALSCORE: REPOSITIONED;REST
CONSOLABILITY: NO NEED TO CONSOLE
TOTALSCORE: 0
FACIALEXPRESSION: SMILING OR INEXPRESSIVE
BREATHING: NORMAL
BODYLANGUAGE: RELAXED
TOTALSCORE: REPOSITIONED;REST
BODYLANGUAGE: RELAXED
BREATHING: NORMAL
FACIALEXPRESSION: SMILING OR INEXPRESSIVE

## 2025-08-29 ASSESSMENT — PAIN SCALES - GENERAL
PAINLEVEL_OUTOF10: 0 - NO PAIN

## 2025-08-29 ASSESSMENT — PAIN - FUNCTIONAL ASSESSMENT
PAIN_FUNCTIONAL_ASSESSMENT: 0-10

## 2025-08-29 ASSESSMENT — ENCOUNTER SYMPTOMS: SHORTNESS OF BREATH: 0

## 2025-08-29 ASSESSMENT — ACTIVITIES OF DAILY LIVING (ADL): HOME_MANAGEMENT_TIME_ENTRY: 19

## 2025-08-30 LAB
1,25(OH)2D SERPL-MCNC: 48.2 PG/ML (ref 19.9–79.3)
ALBUMIN SERPL BCP-MCNC: 3.1 G/DL (ref 3.4–5)
ALP SERPL-CCNC: 89 U/L (ref 33–136)
ALT SERPL W P-5'-P-CCNC: 56 U/L (ref 7–45)
ANION GAP SERPL CALC-SCNC: 9 MMOL/L (ref 10–20)
AST SERPL W P-5'-P-CCNC: 26 U/L (ref 9–39)
BILIRUB SERPL-MCNC: 1.2 MG/DL (ref 0–1.2)
BUN SERPL-MCNC: 45 MG/DL (ref 6–23)
CALCIUM SERPL-MCNC: 9.1 MG/DL (ref 8.6–10.3)
CHLORIDE SERPL-SCNC: 109 MMOL/L (ref 98–107)
CO2 SERPL-SCNC: 26 MMOL/L (ref 21–32)
CREAT SERPL-MCNC: 1.65 MG/DL (ref 0.5–1.05)
EGFRCR SERPLBLD CKD-EPI 2021: 32 ML/MIN/1.73M*2
ERYTHROCYTE [DISTWIDTH] IN BLOOD BY AUTOMATED COUNT: 13.1 % (ref 11.5–14.5)
GLUCOSE BLD MANUAL STRIP-MCNC: 193 MG/DL (ref 74–99)
GLUCOSE BLD MANUAL STRIP-MCNC: 203 MG/DL (ref 74–99)
GLUCOSE BLD MANUAL STRIP-MCNC: 229 MG/DL (ref 74–99)
GLUCOSE BLD MANUAL STRIP-MCNC: 260 MG/DL (ref 74–99)
GLUCOSE BLD MANUAL STRIP-MCNC: 423 MG/DL (ref 74–99)
GLUCOSE SERPL-MCNC: 201 MG/DL (ref 74–99)
HCT VFR BLD AUTO: 46 % (ref 36–46)
HGB BLD-MCNC: 15.7 G/DL (ref 12–16)
MAGNESIUM SERPL-MCNC: 1.63 MG/DL (ref 1.6–2.4)
MCH RBC QN AUTO: 30.4 PG (ref 26–34)
MCHC RBC AUTO-ENTMCNC: 34.1 G/DL (ref 32–36)
MCV RBC AUTO: 89 FL (ref 80–100)
NRBC BLD-RTO: 0 /100 WBCS (ref 0–0)
PLATELET # BLD AUTO: 176 X10*3/UL (ref 150–450)
POTASSIUM SERPL-SCNC: 3.9 MMOL/L (ref 3.5–5.3)
PROT SERPL-MCNC: 6.1 G/DL (ref 6.4–8.2)
RBC # BLD AUTO: 5.16 X10*6/UL (ref 4–5.2)
SODIUM SERPL-SCNC: 140 MMOL/L (ref 136–145)
WBC # BLD AUTO: 10 X10*3/UL (ref 4.4–11.3)

## 2025-08-30 PROCEDURE — 85027 COMPLETE CBC AUTOMATED: CPT | Performed by: INTERNAL MEDICINE

## 2025-08-30 PROCEDURE — 36415 COLL VENOUS BLD VENIPUNCTURE: CPT | Performed by: INTERNAL MEDICINE

## 2025-08-30 PROCEDURE — 2500000002 HC RX 250 W HCPCS SELF ADMINISTERED DRUGS (ALT 637 FOR MEDICARE OP, ALT 636 FOR OP/ED): Performed by: INTERNAL MEDICINE

## 2025-08-30 PROCEDURE — 83735 ASSAY OF MAGNESIUM: CPT | Performed by: INTERNAL MEDICINE

## 2025-08-30 PROCEDURE — 80053 COMPREHEN METABOLIC PANEL: CPT | Performed by: INTERNAL MEDICINE

## 2025-08-30 PROCEDURE — 2500000002 HC RX 250 W HCPCS SELF ADMINISTERED DRUGS (ALT 637 FOR MEDICARE OP, ALT 636 FOR OP/ED): Performed by: HOSPITALIST

## 2025-08-30 PROCEDURE — 99233 SBSQ HOSP IP/OBS HIGH 50: CPT | Performed by: INTERNAL MEDICINE

## 2025-08-30 PROCEDURE — 2500000001 HC RX 250 WO HCPCS SELF ADMINISTERED DRUGS (ALT 637 FOR MEDICARE OP): Performed by: HOSPITALIST

## 2025-08-30 PROCEDURE — 1100000001 HC PRIVATE ROOM DAILY

## 2025-08-30 PROCEDURE — 99232 SBSQ HOSP IP/OBS MODERATE 35: CPT | Performed by: INTERNAL MEDICINE

## 2025-08-30 PROCEDURE — 2500000004 HC RX 250 GENERAL PHARMACY W/ HCPCS (ALT 636 FOR OP/ED): Performed by: HOSPITALIST

## 2025-08-30 PROCEDURE — 82947 ASSAY GLUCOSE BLOOD QUANT: CPT

## 2025-08-30 RX ORDER — INSULIN LISPRO 100 [IU]/ML
10 INJECTION, SOLUTION INTRAVENOUS; SUBCUTANEOUS ONCE
Status: COMPLETED | OUTPATIENT
Start: 2025-08-30 | End: 2025-08-30

## 2025-08-30 RX ORDER — INSULIN GLARGINE 100 [IU]/ML
12 INJECTION, SOLUTION SUBCUTANEOUS EVERY EVENING
Status: DISCONTINUED | OUTPATIENT
Start: 2025-08-30 | End: 2025-08-31 | Stop reason: HOSPADM

## 2025-08-30 RX ADMIN — INSULIN LISPRO 6 UNITS: 100 INJECTION, SOLUTION INTRAVENOUS; SUBCUTANEOUS at 16:14

## 2025-08-30 RX ADMIN — LATANOPROST 1 DROP: 50 SOLUTION/ DROPS OPHTHALMIC at 22:39

## 2025-08-30 RX ADMIN — HEPARIN SODIUM 5000 UNITS: 5000 INJECTION, SOLUTION INTRAVENOUS; SUBCUTANEOUS at 05:59

## 2025-08-30 RX ADMIN — INSULIN LISPRO 2 UNITS: 100 INJECTION, SOLUTION INTRAVENOUS; SUBCUTANEOUS at 08:13

## 2025-08-30 RX ADMIN — HEPARIN SODIUM 5000 UNITS: 5000 INJECTION, SOLUTION INTRAVENOUS; SUBCUTANEOUS at 21:08

## 2025-08-30 RX ADMIN — ASPIRIN 81 MG: 81 TABLET, DELAYED RELEASE ORAL at 08:13

## 2025-08-30 RX ADMIN — INSULIN LISPRO 10 UNITS: 100 INJECTION, SOLUTION INTRAVENOUS; SUBCUTANEOUS at 13:37

## 2025-08-30 RX ADMIN — SENNOSIDES 17.2 MG: 8.6 TABLET, FILM COATED ORAL at 20:40

## 2025-08-30 RX ADMIN — CINACALCET HYDROCHLORIDE 30 MG: 30 TABLET, FILM COATED ORAL at 08:13

## 2025-08-30 RX ADMIN — LISINOPRIL 40 MG: 20 TABLET ORAL at 08:13

## 2025-08-30 RX ADMIN — METOPROLOL SUCCINATE 100 MG: 50 TABLET, EXTENDED RELEASE ORAL at 08:13

## 2025-08-30 RX ADMIN — HEPARIN SODIUM 5000 UNITS: 5000 INJECTION, SOLUTION INTRAVENOUS; SUBCUTANEOUS at 13:37

## 2025-08-30 RX ADMIN — INSULIN GLARGINE 12 UNITS: 100 INJECTION, SOLUTION SUBCUTANEOUS at 20:40

## 2025-08-30 RX ADMIN — INSULIN LISPRO 10 UNITS: 100 INJECTION, SOLUTION INTRAVENOUS; SUBCUTANEOUS at 12:18

## 2025-08-30 ASSESSMENT — COGNITIVE AND FUNCTIONAL STATUS - GENERAL
MOVING TO AND FROM BED TO CHAIR: A LITTLE
MOBILITY SCORE: 14
WALKING IN HOSPITAL ROOM: A LOT
DAILY ACTIVITIY SCORE: 15
PERSONAL GROOMING: A LITTLE
TURNING FROM BACK TO SIDE WHILE IN FLAT BAD: A LITTLE
MOVING FROM LYING ON BACK TO SITTING ON SIDE OF FLAT BED WITH BEDRAILS: A LITTLE
DRESSING REGULAR UPPER BODY CLOTHING: A LITTLE
HELP NEEDED FOR BATHING: A LOT
CLIMB 3 TO 5 STEPS WITH RAILING: TOTAL
TOILETING: A LOT
DRESSING REGULAR LOWER BODY CLOTHING: A LOT
STANDING UP FROM CHAIR USING ARMS: A LOT
EATING MEALS: A LITTLE

## 2025-08-30 ASSESSMENT — PAIN SCALES - GENERAL
PAINLEVEL_OUTOF10: 0 - NO PAIN

## 2025-08-30 ASSESSMENT — PAIN - FUNCTIONAL ASSESSMENT
PAIN_FUNCTIONAL_ASSESSMENT: 0-10

## 2025-08-31 VITALS
HEART RATE: 89 BPM | WEIGHT: 106 LBS | TEMPERATURE: 98.6 F | OXYGEN SATURATION: 96 % | RESPIRATION RATE: 16 BRPM | DIASTOLIC BLOOD PRESSURE: 100 MMHG | SYSTOLIC BLOOD PRESSURE: 133 MMHG | BODY MASS INDEX: 17.66 KG/M2 | HEIGHT: 65 IN

## 2025-08-31 LAB
GLUCOSE BLD MANUAL STRIP-MCNC: 126 MG/DL (ref 74–99)
GLUCOSE BLD MANUAL STRIP-MCNC: 296 MG/DL (ref 74–99)
GLUCOSE BLD MANUAL STRIP-MCNC: 330 MG/DL (ref 74–99)
GLUCOSE BLD MANUAL STRIP-MCNC: 366 MG/DL (ref 74–99)

## 2025-08-31 PROCEDURE — 2500000001 HC RX 250 WO HCPCS SELF ADMINISTERED DRUGS (ALT 637 FOR MEDICARE OP): Performed by: HOSPITALIST

## 2025-08-31 PROCEDURE — 2500000004 HC RX 250 GENERAL PHARMACY W/ HCPCS (ALT 636 FOR OP/ED): Performed by: HOSPITALIST

## 2025-08-31 PROCEDURE — 2500000002 HC RX 250 W HCPCS SELF ADMINISTERED DRUGS (ALT 637 FOR MEDICARE OP, ALT 636 FOR OP/ED): Performed by: HOSPITALIST

## 2025-08-31 PROCEDURE — 82947 ASSAY GLUCOSE BLOOD QUANT: CPT

## 2025-08-31 PROCEDURE — 2500000002 HC RX 250 W HCPCS SELF ADMINISTERED DRUGS (ALT 637 FOR MEDICARE OP, ALT 636 FOR OP/ED): Performed by: INTERNAL MEDICINE

## 2025-08-31 RX ORDER — INSULIN GLARGINE 100 [IU]/ML
12 INJECTION, SOLUTION SUBCUTANEOUS EVERY EVENING
Qty: 3.6 ML | Refills: 2 | Status: SHIPPED | OUTPATIENT
Start: 2025-08-31 | End: 2025-11-29

## 2025-08-31 RX ORDER — SENNOSIDES 8.6 MG/1
2 TABLET ORAL 2 TIMES DAILY
Qty: 120 TABLET | Refills: 0 | Status: SHIPPED | OUTPATIENT
Start: 2025-08-31 | End: 2025-09-30

## 2025-08-31 RX ORDER — CINACALCET 30 MG/1
30 TABLET, FILM COATED ORAL
Qty: 30 TABLET | Refills: 0 | Status: SHIPPED | OUTPATIENT
Start: 2025-09-01 | End: 2025-10-01

## 2025-08-31 RX ORDER — INSULIN LISPRO 100 [IU]/ML
0-10 INJECTION, SOLUTION INTRAVENOUS; SUBCUTANEOUS
Qty: 9 ML | Refills: 0 | Status: SHIPPED | OUTPATIENT
Start: 2025-08-31 | End: 2025-09-30

## 2025-08-31 RX ADMIN — METOPROLOL SUCCINATE 100 MG: 50 TABLET, EXTENDED RELEASE ORAL at 09:51

## 2025-08-31 RX ADMIN — ASPIRIN 81 MG: 81 TABLET, DELAYED RELEASE ORAL at 09:51

## 2025-08-31 RX ADMIN — LISINOPRIL 40 MG: 20 TABLET ORAL at 09:50

## 2025-08-31 RX ADMIN — CINACALCET HYDROCHLORIDE 30 MG: 30 TABLET, FILM COATED ORAL at 09:50

## 2025-08-31 RX ADMIN — HEPARIN SODIUM 5000 UNITS: 5000 INJECTION, SOLUTION INTRAVENOUS; SUBCUTANEOUS at 06:31

## 2025-08-31 RX ADMIN — HEPARIN SODIUM 5000 UNITS: 5000 INJECTION, SOLUTION INTRAVENOUS; SUBCUTANEOUS at 14:44

## 2025-08-31 RX ADMIN — INSULIN LISPRO 10 UNITS: 100 INJECTION, SOLUTION INTRAVENOUS; SUBCUTANEOUS at 12:14

## 2025-08-31 RX ADMIN — SENNOSIDES 17.2 MG: 8.6 TABLET, FILM COATED ORAL at 09:51

## 2025-08-31 ASSESSMENT — COGNITIVE AND FUNCTIONAL STATUS - GENERAL
PERSONAL GROOMING: A LITTLE
MOVING TO AND FROM BED TO CHAIR: A LITTLE
STANDING UP FROM CHAIR USING ARMS: A LITTLE
TOILETING: A LITTLE
WALKING IN HOSPITAL ROOM: A LITTLE
CLIMB 3 TO 5 STEPS WITH RAILING: TOTAL
HELP NEEDED FOR BATHING: A LOT
DRESSING REGULAR LOWER BODY CLOTHING: A LITTLE
DAILY ACTIVITIY SCORE: 19
MOBILITY SCORE: 18

## 2025-08-31 ASSESSMENT — PAIN - FUNCTIONAL ASSESSMENT: PAIN_FUNCTIONAL_ASSESSMENT: 0-10

## 2025-08-31 ASSESSMENT — PAIN SCALES - GENERAL: PAINLEVEL_OUTOF10: 1

## 2025-08-31 ASSESSMENT — PAIN DESCRIPTION - DESCRIPTORS: DESCRIPTORS: DULL

## 2025-09-04 DIAGNOSIS — I10 ESSENTIAL HYPERTENSION: ICD-10-CM

## 2025-09-04 LAB
ALBUMIN: 3.7 G/DL (ref 3.4–5)
ALPHA 1 GLOBULIN: 0.2 G/DL (ref 0.2–0.6)
ALPHA 2 GLOBULIN: 0.8 G/DL (ref 0.4–1.1)
BETA GLOBULIN: 0.9 G/DL (ref 0.5–1.2)
GAMMA GLOBULIN: 1.3 G/DL (ref 0.5–1.4)
IMMUNOFIXATION COMMENT: NORMAL
PATH REVIEW - SERUM IMMUNOFIXATION: NORMAL
PATH REVIEW-SERUM PROTEIN ELECTROPHORESIS: NORMAL
PROTEIN ELECTROPHORESIS COMMENT: NORMAL

## 2025-09-05 LAB — PTH RELATED PROT SERPL-SCNC: 0.9 PMOL/L

## 2025-09-05 RX ORDER — AMLODIPINE BESYLATE 5 MG/1
5 TABLET ORAL DAILY
Qty: 30 TABLET | Refills: 0 | Status: SHIPPED | OUTPATIENT
Start: 2025-09-05

## 2025-11-14 ENCOUNTER — APPOINTMENT (OUTPATIENT)
Dept: PRIMARY CARE | Facility: CLINIC | Age: 75
End: 2025-11-14
Payer: MEDICARE

## 2026-01-27 ENCOUNTER — APPOINTMENT (OUTPATIENT)
Dept: NEPHROLOGY | Facility: CLINIC | Age: 76
End: 2026-01-27
Payer: MEDICARE